# Patient Record
Sex: MALE | Race: WHITE | NOT HISPANIC OR LATINO | Employment: FULL TIME | URBAN - METROPOLITAN AREA
[De-identification: names, ages, dates, MRNs, and addresses within clinical notes are randomized per-mention and may not be internally consistent; named-entity substitution may affect disease eponyms.]

---

## 2013-06-09 LAB — EXTERNAL HIV SCREEN: NORMAL

## 2017-04-18 ENCOUNTER — GENERIC CONVERSION - ENCOUNTER (OUTPATIENT)
Dept: OTHER | Facility: OTHER | Age: 42
End: 2017-04-18

## 2017-06-08 ENCOUNTER — TRANSCRIBE ORDERS (OUTPATIENT)
Dept: LAB | Facility: MEDICAL CENTER | Age: 42
End: 2017-06-08

## 2017-06-08 ENCOUNTER — APPOINTMENT (OUTPATIENT)
Dept: LAB | Facility: MEDICAL CENTER | Age: 42
End: 2017-06-08
Payer: COMMERCIAL

## 2017-06-08 DIAGNOSIS — K59.00 UNSPECIFIED CONSTIPATION: ICD-10-CM

## 2017-06-08 DIAGNOSIS — K21.9 GASTROESOPHAGEAL REFLUX DISEASE, ESOPHAGITIS PRESENCE NOT SPECIFIED: ICD-10-CM

## 2017-06-08 DIAGNOSIS — K21.9 GASTROESOPHAGEAL REFLUX DISEASE, ESOPHAGITIS PRESENCE NOT SPECIFIED: Primary | ICD-10-CM

## 2017-06-08 LAB — TSH SERPL DL<=0.05 MIU/L-ACNC: 1.2 UIU/ML (ref 0.36–3.74)

## 2017-06-08 PROCEDURE — 84443 ASSAY THYROID STIM HORMONE: CPT

## 2017-06-08 PROCEDURE — 36415 COLL VENOUS BLD VENIPUNCTURE: CPT

## 2017-06-19 ENCOUNTER — GENERIC CONVERSION - ENCOUNTER (OUTPATIENT)
Dept: OTHER | Facility: OTHER | Age: 42
End: 2017-06-19

## 2018-04-21 ENCOUNTER — OFFICE VISIT (OUTPATIENT)
Dept: URGENT CARE | Facility: CLINIC | Age: 43
End: 2018-04-21
Payer: COMMERCIAL

## 2018-04-21 VITALS
DIASTOLIC BLOOD PRESSURE: 82 MMHG | RESPIRATION RATE: 20 BRPM | BODY MASS INDEX: 40.04 KG/M2 | WEIGHT: 286 LBS | SYSTOLIC BLOOD PRESSURE: 132 MMHG | HEIGHT: 71 IN | HEART RATE: 72 BPM | TEMPERATURE: 98.8 F | OXYGEN SATURATION: 97 %

## 2018-04-21 DIAGNOSIS — M10.9 ACUTE GOUT INVOLVING TOE OF RIGHT FOOT, UNSPECIFIED CAUSE: Primary | ICD-10-CM

## 2018-04-21 DIAGNOSIS — K21.9 GERD WITHOUT ESOPHAGITIS: ICD-10-CM

## 2018-04-21 PROCEDURE — 99214 OFFICE O/P EST MOD 30 MIN: CPT | Performed by: PHYSICIAN ASSISTANT

## 2018-04-21 RX ORDER — OMEPRAZOLE 20 MG/1
1 CAPSULE, DELAYED RELEASE ORAL DAILY
COMMUNITY
Start: 2012-08-13 | End: 2018-06-11 | Stop reason: SDUPTHER

## 2018-04-21 RX ORDER — INDOMETHACIN 50 MG/1
50 CAPSULE ORAL 2 TIMES DAILY WITH MEALS
Qty: 30 CAPSULE | Refills: 0 | Status: SHIPPED | OUTPATIENT
Start: 2018-04-21 | End: 2018-04-21 | Stop reason: SDUPTHER

## 2018-04-21 RX ORDER — INDOMETHACIN 50 MG/1
50 CAPSULE ORAL 2 TIMES DAILY WITH MEALS
Qty: 30 CAPSULE | Refills: 0 | Status: SHIPPED | OUTPATIENT
Start: 2018-04-21 | End: 2018-04-27

## 2018-04-21 NOTE — PATIENT INSTRUCTIONS
Low purine diet  Take Indocin with food, no alcohol 3 times daily as needed  Continue to take omeprazole 20 milligrams daily  Of eyes that the Indocin could aggravate reflux  Discontinue the medication if noticing recurrence of reflux symptoms  Follow-up with primary care provider if there is no improvement over the next 2-3 days  If occurrences or frequent can discuss prophylactic treatment with primary care provider

## 2018-04-21 NOTE — PROGRESS NOTES
[address] [facility]        NAME: Rliey Smith is a 37 y o  male  : 1975    MRN: [id number]  DATE: 2018  TIME: 1:24 PM    Assessment and Plan   Acute gout involving toe of right foot, unspecified cause [M10 9]  1  Acute gout involving toe of right foot, unspecified cause  indomethacin (INDOCIN) 50 mg capsule    DISCONTINUED: indomethacin (INDOCIN) 50 mg capsule         Patient Instructions     Patient Instructions   Low purine diet  Take Indocin with food, no alcohol 3 times daily as needed  Continue to take omeprazole 20 milligrams daily  Of eyes that the Indocin could aggravate reflux  Discontinue the medication if noticing recurrence of reflux symptoms  Follow-up with primary care provider if there is no improvement over the next 2-3 days  If occurrences or frequent can discuss prophylactic treatment with primary care provider  M*AddThis software was used to dictate this note  It may contain errors with dictating incorrect words/spelling  Please contact provider directly for any questions  Follow up with PCP in 3-5 days  Proceed to  ER if symptoms worsen  Chief Complaint     Chief Complaint   Patient presents with    Toe Pain     R great toe redness and swelling noted Navarro Salinas LPN         History of Present Illness       Patient presents today with right great toe pain over the past several days  He has had increasing symptoms over the past 24 hours  Over the past week he states he has been eating she could, drinking beer  Last evening he had a turkey sandwich  He states he had another episode several months ago  He never saw his family provider to have any blood work done to check for gout  Several months ago when it occurred he states he took ibuprofen and symptoms resolved over several days  He denies any trauma  He notices pain even with light touch    He does have a history of reflux and he states that his symptoms are well controlled on omeprazole daily         Review of Systems   Review of Systems   Constitutional: Negative for chills and fever  Gastrointestinal: Negative for abdominal pain, nausea and vomiting  Musculoskeletal:        As stated in HPI         Current Medications       Current Outpatient Prescriptions:     omeprazole (PriLOSEC) 20 mg delayed release capsule, Take 1 capsule by mouth daily, Disp: , Rfl:     indomethacin (INDOCIN) 50 mg capsule, Take 1 capsule (50 mg total) by mouth 2 (two) times a day with meals, Disp: 30 capsule, Rfl: 0    Current Allergies     Allergies as of 04/21/2018 - Reviewed 04/21/2018   Allergen Reaction Noted    Azithromycin  06/15/2012    Penicillins  11/13/2013            The following portions of the patient's history were reviewed and updated as appropriate: allergies, current medications, past family history, past medical history, past social history, past surgical history and problem list      No past medical history on file  No past surgical history on file  No family history on file  Medications have been verified  Objective   /82 (BP Location: Right arm, Patient Position: Sitting, Cuff Size: Large)   Pulse 72   Temp 98 8 °F (37 1 °C) (Tympanic)   Resp 20   Ht 5' 11" (1 803 m)   Wt 130 kg (286 lb)   SpO2 97%   BMI 39 89 kg/m²        Physical Exam     Physical Exam   Constitutional: He appears well-developed and well-nourished  HENT:   Head: Normocephalic and atraumatic  Cardiovascular: Normal rate, regular rhythm and normal heart sounds  No murmur heard  Pulmonary/Chest: Effort normal and breath sounds normal  No respiratory distress  He has no wheezes  He has no rales  Abdominal: Soft  Bowel sounds are normal    Musculoskeletal:   Right great toe: There is some swelling over the MTP joint and great toe  Tenderness even with light touch  No erythema

## 2018-04-27 ENCOUNTER — OFFICE VISIT (OUTPATIENT)
Dept: INTERNAL MEDICINE CLINIC | Facility: CLINIC | Age: 43
End: 2018-04-27
Payer: COMMERCIAL

## 2018-04-27 VITALS
WEIGHT: 290 LBS | HEIGHT: 71 IN | OXYGEN SATURATION: 97 % | HEART RATE: 64 BPM | RESPIRATION RATE: 16 BRPM | SYSTOLIC BLOOD PRESSURE: 140 MMHG | TEMPERATURE: 98.1 F | DIASTOLIC BLOOD PRESSURE: 88 MMHG | BODY MASS INDEX: 40.6 KG/M2

## 2018-04-27 DIAGNOSIS — K21.9 GERD WITHOUT ESOPHAGITIS: ICD-10-CM

## 2018-04-27 DIAGNOSIS — M10.9 ACUTE GOUT INVOLVING TOE OF RIGHT FOOT, UNSPECIFIED CAUSE: Primary | ICD-10-CM

## 2018-04-27 DIAGNOSIS — E78.49 OTHER HYPERLIPIDEMIA: ICD-10-CM

## 2018-04-27 PROCEDURE — 3008F BODY MASS INDEX DOCD: CPT | Performed by: NURSE PRACTITIONER

## 2018-04-27 PROCEDURE — 99214 OFFICE O/P EST MOD 30 MIN: CPT | Performed by: NURSE PRACTITIONER

## 2018-04-27 NOTE — PROGRESS NOTES
Assessment/Plan: Patient was instructed to continue Indocin at this time  He still has several pills left  He feels it is getting better but would like a uric acid level done  Will order fasting labs to have done and last LDL was up at 126 in 2016  His BP today was slightly up at 140/86  He does check this at home and was advised if any continued high readings to call the office  No problem-specific Assessment & Plan notes found for this encounter  Problem List Items Addressed This Visit     GERD without esophagitis    Relevant Orders    Comprehensive metabolic panel    CBC and differential    TSH, 3rd generation with T4 reflex    Lipid panel    Acute gout involving toe of right foot - Primary    Relevant Orders    Uric acid    Comprehensive metabolic panel    CBC and differential    TSH, 3rd generation with T4 reflex    Lipid panel    Other hyperlipidemia    Relevant Orders    Comprehensive metabolic panel    CBC and differential    TSH, 3rd generation with T4 reflex    Lipid panel            Subjective:      Patient ID: Isaac Fischer is a 37 y o  male  [place] is here today for an UC follow up visit  He was in the UC on 4/21 for complaints right great toe pain over the past several days  He was started on Indocin 50 mg 3 times daily as needed  He is still taking the Indocin and states his great toe is feeling much better he still does have some pain but nothing like it was  She states there is still some redness but this usually does help his symptoms  He also would like blood work done due to not having it done in quite some time  He states he does not eat a very good diet and is trying to watch it more closely  He denies any chest pain, SOB, or palpitations  He denies any other complaints  The following portions of the patient's history were reviewed and updated as appropriate:   He  has no past medical history on file    He   Patient Active Problem List    Diagnosis Date Noted    Other hyperlipidemia 04/27/2018    Acute gout involving toe of right foot 04/21/2018    Peripheral edema 06/08/2016    Diverticulitis of colon 11/13/2013    Asthma 06/15/2012    GERD without esophagitis 06/15/2012    Obesity 06/15/2012    Obstructive sleep apnea 06/15/2012     He  has no past surgical history on file  His family history includes Cancer in his family; Diabetes in his mother; Heart disease in his paternal grandfather; Stroke in his father and maternal grandmother  He  reports that he has never smoked  He has never used smokeless tobacco  He reports that he drinks alcohol  He reports that he does not use drugs  Current Outpatient Prescriptions   Medication Sig Dispense Refill    omeprazole (PriLOSEC) 20 mg delayed release capsule Take 1 capsule by mouth daily       No current facility-administered medications for this visit  Current Outpatient Prescriptions on File Prior to Visit   Medication Sig    omeprazole (PriLOSEC) 20 mg delayed release capsule Take 1 capsule by mouth daily    [DISCONTINUED] indomethacin (INDOCIN) 50 mg capsule Take 1 capsule (50 mg total) by mouth 2 (two) times a day with meals     No current facility-administered medications on file prior to visit  He is allergic to azithromycin and penicillins       Review of Systems   Constitutional: Negative  HENT: Negative  Eyes: Negative  Respiratory: Negative  Cardiovascular: Negative  Gastrointestinal: Negative  Endocrine: Negative  Genitourinary: Negative  Musculoskeletal: Negative  Right great toe pain   Skin: Negative  Allergic/Immunologic: Negative  Neurological: Negative  Hematological: Negative  Psychiatric/Behavioral: Negative            Objective:      /88 (BP Location: Right arm, Patient Position: Sitting, Cuff Size: Large)   Pulse 64   Temp 98 1 °F (36 7 °C) (Oral)   Resp 16   Ht 5' 11" (1 803 m)   Wt 132 kg (290 lb)   SpO2 97%   BMI 40 45 kg/m²          Physical Exam   Constitutional: He is oriented to person, place, and time  He appears well-developed and well-nourished  HENT:   Head: Normocephalic and atraumatic  Right Ear: External ear normal    Left Ear: External ear normal    Nose: Nose normal    Mouth/Throat: Oropharynx is clear and moist    Eyes: Conjunctivae and EOM are normal  Pupils are equal, round, and reactive to light  Neck: Normal range of motion  Neck supple  Cardiovascular: Normal rate, regular rhythm, normal heart sounds and intact distal pulses  Pulmonary/Chest: Effort normal and breath sounds normal    Abdominal: Soft  Bowel sounds are normal    Musculoskeletal: Normal range of motion  Neurological: He is alert and oriented to person, place, and time  He has normal reflexes  Skin: Skin is warm and dry  Slight swelling noted to right great toe no tenderness noted   Psychiatric: He has a normal mood and affect  His behavior is normal  Judgment and thought content normal    Vitals reviewed

## 2018-05-04 ENCOUNTER — LAB (OUTPATIENT)
Dept: LAB | Facility: MEDICAL CENTER | Age: 43
End: 2018-05-04
Payer: COMMERCIAL

## 2018-05-04 ENCOUNTER — TRANSCRIBE ORDERS (OUTPATIENT)
Dept: RADIOLOGY | Facility: MEDICAL CENTER | Age: 43
End: 2018-05-04

## 2018-05-04 DIAGNOSIS — M10.9 ACUTE GOUT INVOLVING TOE OF RIGHT FOOT, UNSPECIFIED CAUSE: ICD-10-CM

## 2018-05-04 DIAGNOSIS — K21.9 GERD WITHOUT ESOPHAGITIS: ICD-10-CM

## 2018-05-04 DIAGNOSIS — E78.49 OTHER HYPERLIPIDEMIA: ICD-10-CM

## 2018-05-04 LAB
ALBUMIN SERPL BCP-MCNC: 3.8 G/DL (ref 3.5–5)
ALP SERPL-CCNC: 60 U/L (ref 46–116)
ALT SERPL W P-5'-P-CCNC: 31 U/L (ref 12–78)
ANION GAP SERPL CALCULATED.3IONS-SCNC: 7 MMOL/L (ref 4–13)
AST SERPL W P-5'-P-CCNC: 19 U/L (ref 5–45)
BASOPHILS # BLD AUTO: 0.03 THOUSANDS/ΜL (ref 0–0.1)
BASOPHILS NFR BLD AUTO: 0 % (ref 0–1)
BILIRUB SERPL-MCNC: 0.3 MG/DL (ref 0.2–1)
BUN SERPL-MCNC: 25 MG/DL (ref 5–25)
CALCIUM SERPL-MCNC: 9.1 MG/DL (ref 8.3–10.1)
CHLORIDE SERPL-SCNC: 108 MMOL/L (ref 100–108)
CHOLEST SERPL-MCNC: 164 MG/DL (ref 50–200)
CO2 SERPL-SCNC: 26 MMOL/L (ref 21–32)
CREAT SERPL-MCNC: 0.83 MG/DL (ref 0.6–1.3)
EOSINOPHIL # BLD AUTO: 0.35 THOUSAND/ΜL (ref 0–0.61)
EOSINOPHIL NFR BLD AUTO: 4 % (ref 0–6)
ERYTHROCYTE [DISTWIDTH] IN BLOOD BY AUTOMATED COUNT: 13.6 % (ref 11.6–15.1)
GFR SERPL CREATININE-BSD FRML MDRD: 108 ML/MIN/1.73SQ M
GLUCOSE P FAST SERPL-MCNC: 91 MG/DL (ref 65–99)
HCT VFR BLD AUTO: 44 % (ref 36.5–49.3)
HDLC SERPL-MCNC: 31 MG/DL (ref 40–60)
HGB BLD-MCNC: 14.9 G/DL (ref 12–17)
LDLC SERPL CALC-MCNC: 92 MG/DL (ref 0–100)
LYMPHOCYTES # BLD AUTO: 2.9 THOUSANDS/ΜL (ref 0.6–4.47)
LYMPHOCYTES NFR BLD AUTO: 36 % (ref 14–44)
MCH RBC QN AUTO: 28.2 PG (ref 26.8–34.3)
MCHC RBC AUTO-ENTMCNC: 33.9 G/DL (ref 31.4–37.4)
MCV RBC AUTO: 83 FL (ref 82–98)
MONOCYTES # BLD AUTO: 0.8 THOUSAND/ΜL (ref 0.17–1.22)
MONOCYTES NFR BLD AUTO: 10 % (ref 4–12)
NEUTROPHILS # BLD AUTO: 3.88 THOUSANDS/ΜL (ref 1.85–7.62)
NEUTS SEG NFR BLD AUTO: 50 % (ref 43–75)
NONHDLC SERPL-MCNC: 133 MG/DL
NRBC BLD AUTO-RTO: 0 /100 WBCS
PLATELET # BLD AUTO: 213 THOUSANDS/UL (ref 149–390)
PMV BLD AUTO: 11.1 FL (ref 8.9–12.7)
POTASSIUM SERPL-SCNC: 4.1 MMOL/L (ref 3.5–5.3)
PROT SERPL-MCNC: 7.4 G/DL (ref 6.4–8.2)
RBC # BLD AUTO: 5.28 MILLION/UL (ref 3.88–5.62)
SODIUM SERPL-SCNC: 141 MMOL/L (ref 136–145)
TRIGL SERPL-MCNC: 203 MG/DL
TSH SERPL DL<=0.05 MIU/L-ACNC: 1.46 UIU/ML (ref 0.36–3.74)
URATE SERPL-MCNC: 7 MG/DL (ref 4.2–8)
WBC # BLD AUTO: 7.96 THOUSAND/UL (ref 4.31–10.16)

## 2018-05-04 PROCEDURE — 80053 COMPREHEN METABOLIC PANEL: CPT

## 2018-05-04 PROCEDURE — 84550 ASSAY OF BLOOD/URIC ACID: CPT

## 2018-05-04 PROCEDURE — 84443 ASSAY THYROID STIM HORMONE: CPT

## 2018-05-04 PROCEDURE — 36415 COLL VENOUS BLD VENIPUNCTURE: CPT

## 2018-05-04 PROCEDURE — 80061 LIPID PANEL: CPT

## 2018-05-04 PROCEDURE — 85025 COMPLETE CBC W/AUTO DIFF WBC: CPT

## 2018-05-07 NOTE — PROGRESS NOTES
Can you let Darshan Kaleigh know his blood work did come back showing his Uric acid is normal at 7 and his triglycerides are up at 203 we like this less than 150  He can add fiber in his diet to help bring this down

## 2018-05-11 DIAGNOSIS — M10.00 IDIOPATHIC GOUT, UNSPECIFIED CHRONICITY, UNSPECIFIED SITE: Primary | ICD-10-CM

## 2018-05-11 RX ORDER — COLCHICINE 0.6 MG/1
TABLET ORAL
Qty: 3 TABLET | Refills: 0 | Status: SHIPPED | OUTPATIENT
Start: 2018-05-11 | End: 2018-06-11 | Stop reason: ALTCHOICE

## 2018-05-11 NOTE — TELEPHONE ENCOUNTER
Received a call from Osvaldo Rios stating that his toe is swollen and in pain again  After taking the indomethacin he was feeling better for about 1 week, but since 5/10/18, it has gotten worse  Notified Susan Gay since she's not in the office  And Per Susan Gay see if Osvaldo Blanca would like colchicine 0 6mg sent to the pharmacy  SItab now, 1tab in 2 hours, and can repeat 3rd dose in 2 hrs  Pt is to stop if pain subsides or having diarrhea  Osvaldo Rios is to stay away from beer and seafood  [facility] and reviewed all information including to stop if pain subsides or having diarrhea, as well as to sty away from beer and seafood  He wanted to try rx and I called it into [facility]

## 2018-06-11 ENCOUNTER — OFFICE VISIT (OUTPATIENT)
Dept: INTERNAL MEDICINE CLINIC | Facility: CLINIC | Age: 43
End: 2018-06-11
Payer: COMMERCIAL

## 2018-06-11 VITALS
TEMPERATURE: 98.5 F | SYSTOLIC BLOOD PRESSURE: 122 MMHG | HEART RATE: 66 BPM | DIASTOLIC BLOOD PRESSURE: 70 MMHG | WEIGHT: 285.25 LBS | BODY MASS INDEX: 39.94 KG/M2 | OXYGEN SATURATION: 96 % | HEIGHT: 71 IN

## 2018-06-11 DIAGNOSIS — M10.071 ACUTE IDIOPATHIC GOUT INVOLVING TOE OF RIGHT FOOT: Primary | ICD-10-CM

## 2018-06-11 DIAGNOSIS — G47.33 OBSTRUCTIVE SLEEP APNEA: ICD-10-CM

## 2018-06-11 DIAGNOSIS — K21.9 GASTROESOPHAGEAL REFLUX DISEASE WITHOUT ESOPHAGITIS: ICD-10-CM

## 2018-06-11 PROCEDURE — 99215 OFFICE O/P EST HI 40 MIN: CPT | Performed by: INTERNAL MEDICINE

## 2018-06-11 RX ORDER — OMEPRAZOLE 20 MG/1
20 CAPSULE, DELAYED RELEASE ORAL DAILY
Qty: 30 CAPSULE | Refills: 5 | Status: SHIPPED | OUTPATIENT
Start: 2018-06-11 | End: 2019-08-17 | Stop reason: SDUPTHER

## 2018-06-11 RX ORDER — TERBINAFINE HYDROCHLORIDE 250 MG/1
TABLET ORAL
COMMUNITY
Start: 2018-05-11 | End: 2021-08-05 | Stop reason: ALTCHOICE

## 2018-06-11 NOTE — PROGRESS NOTES
Assessment/Plan:         Diagnoses and all orders for this visit:    Acute idiopathic gout involving toe of right foot  Recent flare ?etiology with normal uric acid level  Increase water intake  Recent labs reviewed     Gastroesophageal reflux disease without esophagitis  -     omeprazole (PriLOSEC) 20 mg delayed release capsule; Take 1 capsule (20 mg total) by mouth daily for 30 days  Diet is challenged by his hectic schedule  Increase fiber, limit iced tea, increae water intake,increase lean protein    Obstructive sleep apnea  Continue cpap ?update study soon  He will call his supplier and check status as it seems like he would be due for new machine at this point but aware may need repeat sleep study since greater than 5 years    Other orders  -     terbinafine (LamISIL) 250 mg tablet;     Rto prn       Patient ID: Paula Low is a 37 y o  male  HPI   Pt new patient to establish  Recent gout flare - no prior history  Sxs resolved now  Lot of stress recently  No chest pain or sob  Feels otherwise has been ok  Recent labs were ok        Review of Systems   Constitutional: Negative  HENT: Negative  Eyes: Negative  Respiratory: Negative  Cardiovascular: Negative  Gastrointestinal: Negative  Endocrine: Negative  Genitourinary: Negative  Musculoskeletal: Positive for arthralgias and joint swelling  Skin: Negative  Allergic/Immunologic: Negative  Neurological: Negative  Hematological: Negative  Psychiatric/Behavioral: Negative  Allergies   Allergen Reactions    Azithromycin     Penicillins      Social History     Social History    Marital status: /Civil Union     Spouse name: N/A    Number of children: N/A    Years of education: N/A     Occupational History    Not on file       Social History Main Topics    Smoking status: Never Smoker    Smokeless tobacco: Never Used      Comment: (unknown if ever smoked-as per [employer])    Alcohol use Yes Comment: socially    Drug use: No    Sexual activity: Not on file     Other Topics Concern    Not on file     Social History Narrative    No narrative on file     Past Medical History:   Diagnosis Date    Diverticulitis of colon     GERD (gastroesophageal reflux disease)      History reviewed  No pertinent surgical history  /70   Pulse 66   Temp 98 5 °F (36 9 °C) (Tympanic)   Ht 5' 11" (1 803 m)   Wt 129 kg (285 lb 4 oz)   SpO2 96%   BMI 39 78 kg/m²      Physical Exam   Constitutional: He is oriented to person, place, and time  He appears well-developed and well-nourished  No distress  HENT:   Head: Normocephalic and atraumatic  Right Ear: External ear normal    Left Ear: External ear normal    Nose: Nose normal    Mouth/Throat: Oropharynx is clear and moist  No oropharyngeal exudate  Eyes: Conjunctivae and EOM are normal  Pupils are equal, round, and reactive to light  No scleral icterus  Neck: Normal range of motion  Neck supple  No JVD present  Cardiovascular: Normal rate, regular rhythm, normal heart sounds and intact distal pulses  Pulmonary/Chest: Effort normal and breath sounds normal    Abdominal: Soft  Bowel sounds are normal    Musculoskeletal: Normal range of motion  He exhibits deformity  He exhibits no edema or tenderness  Lymphadenopathy:     He has no cervical adenopathy  Neurological: He is alert and oriented to person, place, and time  He has normal reflexes  He displays normal reflexes  No cranial nerve deficit  He exhibits abnormal muscle tone  Coordination normal    Skin: Skin is warm and dry  He is not diaphoretic  Psychiatric: He has a normal mood and affect  His behavior is normal  Judgment and thought content normal    Nursing note and vitals reviewed

## 2018-06-11 NOTE — PATIENT INSTRUCTIONS
Gout   AMBULATORY CARE:   Gout  is a form of arthritis that causes severe joint pain and stiffness  Acute gout pain starts suddenly, gets worse quickly, and stops on its own  Acute gout can become chronic and cause permanent damage to your joints  Seek care immediately if:   · You have severe pain in one or more of your joints that you cannot tolerate  · You have a fever or redness that spreads beyond the joint area  Contact your healthcare provider if:   · You have new symptoms, such as a rash, after you start gout treatment  · Your joint pain and swelling do not go away, even after treatment  · You are not urinating as much or as often as you usually do  · You have trouble taking your gout medicines  · You have questions or concerns about your condition or care  Stages of gout:   · Hyperuricemia  starts with high levels of uric acid  Hyperuricemia is not gout, but it increases your risk for gout  You may have no symptoms at this stage, and it usually does not need treatment  · Acute gouty arthritis  starts with a sudden attack of pain and swelling, usually in 1 joint  The attack may last from a few days to 2 weeks  · Intercritical gout  is the time between attacks  You may go months or years without another attack  You will not have joint pain or stiffness, but this does not mean your gout is cured  You will still need treatment to prevent chronic gout  · Chronic tophaceous gout  develops if gout is not treated  Large amounts of uric acid crystals, called tophi, collect around your joints  The crystals can destroy or deform the joints  Gout attacks occur more often, and last hours to weeks  More than 1 joint may be painful and swollen  At this stage, gout symptoms do not go away on their own  Treatment  can make your symptoms stop sooner, prevent attacks, and decrease your risk of joint damage   You may need any of the following:  · Medicines:      ¨ NSAIDs , such as ibuprofen, help decrease swelling, pain, and fever  This medicine is available with or without a doctor's order  NSAIDs can cause stomach bleeding or kidney problems in certain people  If you take blood thinner medicine, always ask your healthcare provider if NSAIDs are safe for you  Always read the medicine label and follow directions  ¨ Gout medicine  decreases joint pain and swelling  It may also be given to prevent new gout attacks  ¨ Steroids  reduce inflammation and can help your joint stiffness and pain during gout attacks  ¨ Uric acid medicine  may be given to reduce the amount of uric acid your body makes  Some medicines may help you pass more uric acid when you urinate  ¨ Take your medicine as directed  Contact your healthcare provider if you think your medicine is not helping or if you have side effects  Tell him or her if you are allergic to any medicine  Keep a list of the medicines, vitamins, and herbs you take  Include the amounts, and when and why you take them  Bring the list or the pill bottles to follow-up visits  Carry your medicine list with you in case of an emergency  · Surgery  called a bone graft may be needed for tophi that are painful or infected  Bone in the joint may be replaced with bone taken from another place in your body  Ask your healthcare provider for more information about bone graft surgery  Manage gout:   · Rest your painful joint so it can heal   Your healthcare provider may recommend crutches or a walker if the affected joint is in a leg  · Apply ice to your joint  Ice decreases pain and swelling  Use an ice pack, or put crushed ice in a plastic bag  Cover the ice pack or bag with a towel before you apply it to your painful joint  Apply ice for 15 to 20 minutes every hour, or as directed  · Elevate your joint  Elevation helps reduce swelling and pain  Raise your joint above the level of your heart as often as you can   Prop your painful joint on pillows to keep it above your heart comfortably  · Go to physical therapy if directed  A physical therapist can teach you exercises to improve flexibility and range of motion  Prevent gout attacks:   · Do not eat high-purine foods  These foods include meats, seafood, asparagus, spinach, cauliflower, and some types of beans  Healthcare providers may tell you to eat more low-fat milk products, such as yogurt  Milk products may decrease your risk for gout attacks  Vitamin C and coffee may also help  Your healthcare provider or dietitian can help you create a meal plan  · Drink more liquids as directed  Liquids such as water help remove uric acid from your body  Ask how much liquid to drink each day and which liquids are best for you  · Manage your weight  Weight loss may decrease the amount of uric acid in your body  Exercise can help you lose weight  Talk to your healthcare provider about the best exercises for you  · Control your blood sugar level if you have diabetes  Keep your blood sugar level in a normal range  This can help prevent gout attacks  · Limit or do not drink alcohol as directed  Alcohol can trigger a gout attack  Alcohol also increases your risk for dehydration  Ask your healthcare provider if alcohol is safe for you  Follow up with your healthcare provider as directed:  Write down your questions so you remember to ask them during your visits  © 2017 [address] Information is for End User's use only and may not be sold, redistributed or otherwise used for commercial purposes  All illustrations and images included in CareNotes® are the copyrighted property of [employer]  or [employer]  The above information is an  only  It is not intended as medical advice for individual conditions or treatments  Talk to your doctor, nurse or pharmacist before following any medical regimen to see if it is safe and effective for you

## 2018-06-25 ENCOUNTER — LAB (OUTPATIENT)
Dept: LAB | Facility: MEDICAL CENTER | Age: 43
End: 2018-06-25
Payer: COMMERCIAL

## 2018-06-25 ENCOUNTER — APPOINTMENT (OUTPATIENT)
Dept: RADIOLOGY | Facility: MEDICAL CENTER | Age: 43
End: 2018-06-25
Payer: COMMERCIAL

## 2018-06-25 ENCOUNTER — TRANSCRIBE ORDERS (OUTPATIENT)
Dept: RADIOLOGY | Facility: MEDICAL CENTER | Age: 43
End: 2018-06-25

## 2018-06-25 DIAGNOSIS — M10.071 ACUTE IDIOPATHIC GOUT INVOLVING TOE OF RIGHT FOOT: ICD-10-CM

## 2018-06-25 LAB
CRP SERPL QL: 3.9 MG/L
ERYTHROCYTE [SEDIMENTATION RATE] IN BLOOD: 8 MM/HOUR (ref 0–10)

## 2018-06-25 PROCEDURE — 86140 C-REACTIVE PROTEIN: CPT

## 2018-06-25 PROCEDURE — 36415 COLL VENOUS BLD VENIPUNCTURE: CPT

## 2018-06-25 PROCEDURE — 85652 RBC SED RATE AUTOMATED: CPT

## 2018-06-25 PROCEDURE — 73630 X-RAY EXAM OF FOOT: CPT

## 2018-07-24 ENCOUNTER — TELEPHONE (OUTPATIENT)
Dept: INTERNAL MEDICINE CLINIC | Facility: CLINIC | Age: 43
End: 2018-07-24

## 2018-07-24 DIAGNOSIS — M10.9 GOUT INVOLVING TOE OF RIGHT FOOT, UNSPECIFIED CAUSE, UNSPECIFIED CHRONICITY: Primary | ICD-10-CM

## 2018-07-24 RX ORDER — INDOMETHACIN 50 MG/1
50 CAPSULE ORAL 2 TIMES DAILY WITH MEALS
Qty: 10 CAPSULE | Refills: 0 | Status: SHIPPED | OUTPATIENT
Start: 2018-07-24 | End: 2019-09-04

## 2018-07-24 NOTE — TELEPHONE ENCOUNTER
Patient called with c/o R toe gout pain    States he has tried colchicine in the past   Asking for medication    All-pcn, zpack  [facility]

## 2018-07-26 ENCOUNTER — APPOINTMENT (OUTPATIENT)
Dept: LAB | Facility: MEDICAL CENTER | Age: 43
End: 2018-07-26
Payer: COMMERCIAL

## 2018-07-26 ENCOUNTER — TRANSCRIBE ORDERS (OUTPATIENT)
Dept: LAB | Facility: MEDICAL CENTER | Age: 43
End: 2018-07-26

## 2018-07-26 DIAGNOSIS — J91.8 PLEURAL EFFUSION ASSOCIATED WITH HEPATIC DISORDER: ICD-10-CM

## 2018-07-26 DIAGNOSIS — J91.8 PLEURAL EFFUSION ASSOCIATED WITH HEPATIC DISORDER: Primary | ICD-10-CM

## 2018-07-26 DIAGNOSIS — K76.9 PLEURAL EFFUSION ASSOCIATED WITH HEPATIC DISORDER: ICD-10-CM

## 2018-07-26 DIAGNOSIS — K76.9 PLEURAL EFFUSION ASSOCIATED WITH HEPATIC DISORDER: Primary | ICD-10-CM

## 2018-07-26 LAB
ALBUMIN SERPL BCP-MCNC: 3.7 G/DL (ref 3.5–5)
ALP SERPL-CCNC: 57 U/L (ref 46–116)
ALT SERPL W P-5'-P-CCNC: 28 U/L (ref 12–78)
AST SERPL W P-5'-P-CCNC: 17 U/L (ref 5–45)
BASOPHILS # BLD AUTO: 0.03 THOUSANDS/ΜL (ref 0–0.1)
BASOPHILS NFR BLD AUTO: 0 % (ref 0–1)
BILIRUB DIRECT SERPL-MCNC: 0.07 MG/DL (ref 0–0.2)
BILIRUB SERPL-MCNC: 0.39 MG/DL (ref 0.2–1)
EOSINOPHIL # BLD AUTO: 0.13 THOUSAND/ΜL (ref 0–0.61)
EOSINOPHIL NFR BLD AUTO: 2 % (ref 0–6)
ERYTHROCYTE [DISTWIDTH] IN BLOOD BY AUTOMATED COUNT: 13.5 % (ref 11.6–15.1)
HCT VFR BLD AUTO: 44.8 % (ref 36.5–49.3)
HGB BLD-MCNC: 14.3 G/DL (ref 12–17)
IMM GRANULOCYTES # BLD AUTO: 0.02 THOUSAND/UL (ref 0–0.2)
IMM GRANULOCYTES NFR BLD AUTO: 0 % (ref 0–2)
LYMPHOCYTES # BLD AUTO: 2.34 THOUSANDS/ΜL (ref 0.6–4.47)
LYMPHOCYTES NFR BLD AUTO: 33 % (ref 14–44)
MCH RBC QN AUTO: 27.6 PG (ref 26.8–34.3)
MCHC RBC AUTO-ENTMCNC: 31.9 G/DL (ref 31.4–37.4)
MCV RBC AUTO: 87 FL (ref 82–98)
MONOCYTES # BLD AUTO: 0.74 THOUSAND/ΜL (ref 0.17–1.22)
MONOCYTES NFR BLD AUTO: 11 % (ref 4–12)
NEUTROPHILS # BLD AUTO: 3.78 THOUSANDS/ΜL (ref 1.85–7.62)
NEUTS SEG NFR BLD AUTO: 54 % (ref 43–75)
NRBC BLD AUTO-RTO: 0 /100 WBCS
PLATELET # BLD AUTO: 207 THOUSANDS/UL (ref 149–390)
PMV BLD AUTO: 11.4 FL (ref 8.9–12.7)
PROT SERPL-MCNC: 7.6 G/DL (ref 6.4–8.2)
RBC # BLD AUTO: 5.18 MILLION/UL (ref 3.88–5.62)
WBC # BLD AUTO: 7.04 THOUSAND/UL (ref 4.31–10.16)

## 2018-07-26 PROCEDURE — 80076 HEPATIC FUNCTION PANEL: CPT

## 2018-07-26 PROCEDURE — 36415 COLL VENOUS BLD VENIPUNCTURE: CPT

## 2018-07-26 PROCEDURE — 85025 COMPLETE CBC W/AUTO DIFF WBC: CPT

## 2018-11-30 ENCOUNTER — OFFICE VISIT (OUTPATIENT)
Dept: URGENT CARE | Facility: CLINIC | Age: 43
End: 2018-11-30
Payer: COMMERCIAL

## 2018-11-30 VITALS
WEIGHT: 278 LBS | TEMPERATURE: 100.6 F | DIASTOLIC BLOOD PRESSURE: 73 MMHG | HEART RATE: 84 BPM | OXYGEN SATURATION: 96 % | HEIGHT: 71 IN | RESPIRATION RATE: 20 BRPM | SYSTOLIC BLOOD PRESSURE: 133 MMHG | BODY MASS INDEX: 38.92 KG/M2

## 2018-11-30 DIAGNOSIS — K57.92 DIVERTICULITIS: Primary | ICD-10-CM

## 2018-11-30 DIAGNOSIS — R10.32 LLQ PAIN: ICD-10-CM

## 2018-11-30 PROCEDURE — 99213 OFFICE O/P EST LOW 20 MIN: CPT | Performed by: PHYSICIAN ASSISTANT

## 2018-11-30 RX ORDER — CIPROFLOXACIN 500 MG/1
500 TABLET, FILM COATED ORAL EVERY 12 HOURS SCHEDULED
Qty: 14 TABLET | Refills: 0 | Status: SHIPPED | OUTPATIENT
Start: 2018-11-30 | End: 2018-12-07

## 2018-11-30 RX ORDER — METRONIDAZOLE 500 MG/1
500 TABLET ORAL EVERY 8 HOURS SCHEDULED
Qty: 21 TABLET | Refills: 0 | Status: SHIPPED | OUTPATIENT
Start: 2018-11-30 | End: 2018-12-07

## 2018-11-30 NOTE — PROGRESS NOTES
[address] [facility]  ([facility] [phone]  (fax) [phone]        NAME: Bassam Rhodes is a 37 y o  male  : 1975    MRN: [id number]  DATE: 2018  TIME: 11:19 AM    Assessment and Plan   Diverticulitis [K57 92]  1  Diverticulitis  ciprofloxacin (CIPRO) 500 mg tablet    metroNIDAZOLE (FLAGYL) 500 mg tablet   2  LLQ pain         Patient Instructions   I have prescribed an antibiotic for the infection  Please take the antibiotic as prescribed and finish the entire prescription  I recommend that the patient takes an over the counter probiotic to keep good bacteria in the gut and help prevent diarrhea  Ibuprofen and/or tylenol as needed for pain or fever  The antibiotic (Cipro) you have been prescribed can affect the tendons  Patient warned of the risk of tendon rupture due to the antibiotic  Patient is to limit physical activity for 3-4 weeks after finishing the antibiotic  Patient did verbalize understanding  If fever spikes and or pain worsens and or pain does not improve in next 24-48 hours he is to present to ER  Patient did verbalize understanding  Follow up with GI in next 3-5 days  To present to the ER if symptoms worsen  Chief Complaint     Chief Complaint   Patient presents with    Abdominal Pain     Lower abdominal pain x 3 days Hx of diverticulitis  Kyrie Russo LPN         History of Present Illness   Bassam Rhodes presents to the clinic c/o    Abdominal Pain   This is a new problem  The current episode started in the past 7 days  The onset quality is gradual  The problem occurs intermittently  The problem has been gradually improving  The pain is located in the LLQ  The pain is mild  The quality of the pain is aching  The abdominal pain does not radiate  Associated symptoms include constipation (mild) and a fever   Pertinent negatives include no anorexia, arthralgias, belching, diarrhea, dysuria, flatus, frequency, headaches, hematochezia, hematuria, melena, myalgias, nausea or vomiting  The pain is aggravated by certain positions and palpation  Treatments tried: diet adjustments  The treatment provided mild relief  There is no history of abdominal surgery  Review of Systems   Review of Systems   Constitutional: Positive for fever  Negative for activity change, appetite change, chills, diaphoresis and fatigue  HENT: Negative for congestion, ear discharge, ear pain, facial swelling, rhinorrhea, sinus pain, sinus pressure, sneezing and sore throat  Eyes: Negative for photophobia, pain, discharge, redness, itching and visual disturbance  Respiratory: Negative for apnea, cough, chest tightness, shortness of breath and wheezing  Cardiovascular: Negative for chest pain  Gastrointestinal: Positive for abdominal pain and constipation (mild)  Negative for abdominal distention, anal bleeding, anorexia, blood in stool, diarrhea, flatus, hematochezia, melena, nausea and vomiting  Genitourinary: Negative for dysuria, flank pain, frequency, hematuria and urgency  Musculoskeletal: Negative for arthralgias, back pain, gait problem, joint swelling, myalgias, neck pain and neck stiffness  Skin: Negative for color change, rash and wound  Allergic/Immunologic: Negative for immunocompromised state  Neurological: Negative for dizziness, facial asymmetry and headaches  Hematological: Negative for adenopathy  Psychiatric/Behavioral: Negative for confusion and decreased concentration           Current Medications     Long-Term Prescriptions   Medication Sig Dispense Refill    indomethacin (INDOCIN) 50 mg capsule Take 1 capsule (50 mg total) by mouth 2 (two) times a day with meals for 5 days 10 capsule 0    omeprazole (PriLOSEC) 20 mg delayed release capsule Take 1 capsule (20 mg total) by mouth daily for 30 days 30 capsule 5       Current Allergies     Allergies as of 11/30/2018 - Reviewed 11/30/2018   Allergen Reaction Noted    Azithromycin  06/15/2012    Penicillins  11/13/2013            The following portions of the patient's history were reviewed and updated as appropriate: allergies, current medications, past family history, past medical history, past social history, past surgical history and problem list   Past Medical History:   Diagnosis Date    Diverticulitis of colon     GERD (gastroesophageal reflux disease)     Obesity      History reviewed  No pertinent surgical history  Social History     Social History    Marital status: /Civil Union     Spouse name: N/A    Number of children: N/A    Years of education: N/A     Occupational History    Not on file  Social History Main Topics    Smoking status: Never Smoker    Smokeless tobacco: Never Used      Comment: (unknown if ever smoked-as per [employer])    Alcohol use Yes      Comment: socially    Drug use: No    Sexual activity: Not on file     Other Topics Concern    Not on file     Social History Narrative    No narrative on file       Objective   /73 (BP Location: Right arm, Patient Position: Sitting, Cuff Size: Large)   Pulse 84   Temp (!) 100 6 °F (38 1 °C) (Tympanic)   Resp 20   Ht 5' 11" (1 803 m)   Wt 126 kg (278 lb)   SpO2 96%   BMI 38 77 kg/m²      Physical Exam     Physical Exam   Constitutional: He is oriented to person, place, and time  He appears well-developed and well-nourished  No distress  HENT:   Head: Normocephalic and atraumatic  Right Ear: Tympanic membrane and external ear normal    Left Ear: Tympanic membrane and external ear normal    Nose: Nose normal    Mouth/Throat: Oropharynx is clear and moist  No oropharyngeal exudate  Eyes: Pupils are equal, round, and reactive to light  Conjunctivae and EOM are normal  Right eye exhibits no discharge  Left eye exhibits no discharge  No scleral icterus  Neck: Normal range of motion  Neck supple  No JVD present  No tracheal deviation present   No thyromegaly present  Cardiovascular: Normal rate, regular rhythm and normal heart sounds  Exam reveals no gallop and no friction rub  No murmur heard  Pulmonary/Chest: Effort normal and breath sounds normal  No stridor  No respiratory distress  He has no wheezes  He has no rales  He exhibits no tenderness  Abdominal: Soft  Bowel sounds are normal  He exhibits no distension, no ascites and no mass  There is no hepatosplenomegaly  There is tenderness in the left lower quadrant  There is no rigidity, no rebound, no guarding, no CVA tenderness, no tenderness at McBurney's point and negative Petty's sign  Musculoskeletal: Normal range of motion  He exhibits no tenderness or deformity  Lymphadenopathy:     He has no cervical adenopathy  Neurological: He is alert and oriented to person, place, and time  He has normal reflexes  Coordination normal    Skin: Skin is warm and dry  No rash noted  He is not diaphoretic  No erythema  No pallor  Psychiatric: He has a normal mood and affect  His behavior is normal  Judgment and thought content normal    Nursing note and vitals reviewed        Екатерина SpeakerSRINATH

## 2019-06-07 ENCOUNTER — OFFICE VISIT (OUTPATIENT)
Dept: URGENT CARE | Facility: CLINIC | Age: 44
End: 2019-06-07
Payer: COMMERCIAL

## 2019-06-07 VITALS
WEIGHT: 282 LBS | HEART RATE: 72 BPM | OXYGEN SATURATION: 97 % | DIASTOLIC BLOOD PRESSURE: 80 MMHG | TEMPERATURE: 98.6 F | BODY MASS INDEX: 39.48 KG/M2 | SYSTOLIC BLOOD PRESSURE: 136 MMHG | HEIGHT: 71 IN | RESPIRATION RATE: 20 BRPM

## 2019-06-07 DIAGNOSIS — H65.92 MIDDLE EAR EFFUSION, LEFT: Primary | ICD-10-CM

## 2019-06-07 PROCEDURE — 99213 OFFICE O/P EST LOW 20 MIN: CPT | Performed by: PHYSICIAN ASSISTANT

## 2019-06-07 RX ORDER — FLUTICASONE PROPIONATE 50 MCG
1 SPRAY, SUSPENSION (ML) NASAL DAILY
Qty: 16 G | Refills: 0 | Status: SHIPPED | OUTPATIENT
Start: 2019-06-07

## 2019-06-28 ENCOUNTER — OFFICE VISIT (OUTPATIENT)
Dept: OTOLARYNGOLOGY | Facility: CLINIC | Age: 44
End: 2019-06-28
Payer: COMMERCIAL

## 2019-06-28 VITALS
HEIGHT: 71 IN | HEART RATE: 55 BPM | SYSTOLIC BLOOD PRESSURE: 118 MMHG | BODY MASS INDEX: 39.06 KG/M2 | DIASTOLIC BLOOD PRESSURE: 70 MMHG | WEIGHT: 279 LBS

## 2019-06-28 DIAGNOSIS — H90.12 CONDUCTIVE HEARING LOSS OF LEFT EAR, UNSPECIFIED HEARING STATUS ON CONTRALATERAL SIDE: ICD-10-CM

## 2019-06-28 DIAGNOSIS — H65.92 LEFT OTITIS MEDIA WITH EFFUSION: Primary | ICD-10-CM

## 2019-06-28 PROCEDURE — 99243 OFF/OP CNSLTJ NEW/EST LOW 30: CPT | Performed by: OTOLARYNGOLOGY

## 2019-08-15 ENCOUNTER — OFFICE VISIT (OUTPATIENT)
Dept: SLEEP CENTER | Facility: CLINIC | Age: 44
End: 2019-08-15
Payer: COMMERCIAL

## 2019-08-15 ENCOUNTER — OFFICE VISIT (OUTPATIENT)
Dept: UROLOGY | Facility: CLINIC | Age: 44
End: 2019-08-15
Payer: COMMERCIAL

## 2019-08-15 VITALS
OXYGEN SATURATION: 97 % | HEIGHT: 71 IN | DIASTOLIC BLOOD PRESSURE: 78 MMHG | HEART RATE: 84 BPM | WEIGHT: 278 LBS | SYSTOLIC BLOOD PRESSURE: 112 MMHG | BODY MASS INDEX: 38.92 KG/M2

## 2019-08-15 VITALS
SYSTOLIC BLOOD PRESSURE: 126 MMHG | HEIGHT: 71 IN | BODY MASS INDEX: 38.92 KG/M2 | WEIGHT: 278 LBS | HEART RATE: 68 BPM | DIASTOLIC BLOOD PRESSURE: 80 MMHG

## 2019-08-15 DIAGNOSIS — R31.9 HEMATURIA, UNSPECIFIED TYPE: ICD-10-CM

## 2019-08-15 DIAGNOSIS — R40.0 DAYTIME SLEEPINESS: ICD-10-CM

## 2019-08-15 DIAGNOSIS — E66.9 OBESITY (BMI 30-39.9): ICD-10-CM

## 2019-08-15 DIAGNOSIS — R35.0 URINARY FREQUENCY: Primary | ICD-10-CM

## 2019-08-15 DIAGNOSIS — K21.9 GASTROESOPHAGEAL REFLUX DISEASE WITHOUT ESOPHAGITIS: ICD-10-CM

## 2019-08-15 DIAGNOSIS — G47.26 SHIFT WORK SLEEP DISORDER: ICD-10-CM

## 2019-08-15 DIAGNOSIS — J31.0 CHRONIC RHINITIS: ICD-10-CM

## 2019-08-15 DIAGNOSIS — G47.33 OBSTRUCTIVE SLEEP APNEA: Primary | ICD-10-CM

## 2019-08-15 LAB
BACTERIA UR QL AUTO: ABNORMAL /HPF
BILIRUB UR QL STRIP: NEGATIVE
CLARITY UR: CLEAR
COLOR UR: ABNORMAL
GLUCOSE UR STRIP-MCNC: NEGATIVE MG/DL
HGB UR QL STRIP.AUTO: NEGATIVE
HYALINE CASTS #/AREA URNS LPF: ABNORMAL /LPF
KETONES UR STRIP-MCNC: NEGATIVE MG/DL
LEUKOCYTE ESTERASE UR QL STRIP: ABNORMAL
NITRITE UR QL STRIP: NEGATIVE
NON-SQ EPI CELLS URNS QL MICRO: ABNORMAL /HPF
PH UR STRIP.AUTO: 6 [PH]
POST-VOID RESIDUAL VOLUME, ML POC: 52 ML
PROT UR STRIP-MCNC: ABNORMAL MG/DL
RBC #/AREA URNS AUTO: ABNORMAL /HPF
SL AMB  POCT GLUCOSE, UA: ABNORMAL
SL AMB LEUKOCYTE ESTERASE,UA: ABNORMAL
SL AMB POCT BILIRUBIN,UA: ABNORMAL
SL AMB POCT BLOOD,UA: ABNORMAL
SL AMB POCT CLARITY,UA: CLEAR
SL AMB POCT COLOR,UA: YELLOW
SL AMB POCT KETONES,UA: ABNORMAL
SL AMB POCT NITRITE,UA: ABNORMAL
SL AMB POCT PH,UA: 5
SL AMB POCT SPECIFIC GRAVITY,UA: 1.02
SL AMB POCT URINE PROTEIN: ABNORMAL
SL AMB POCT UROBILINOGEN: ABNORMAL
SP GR UR STRIP.AUTO: 1.03 (ref 1–1.03)
UROBILINOGEN UR QL STRIP.AUTO: 0.2 E.U./DL
WBC #/AREA URNS AUTO: ABNORMAL /HPF

## 2019-08-15 PROCEDURE — 99204 OFFICE O/P NEW MOD 45 MIN: CPT | Performed by: INTERNAL MEDICINE

## 2019-08-15 PROCEDURE — 81002 URINALYSIS NONAUTO W/O SCOPE: CPT | Performed by: UROLOGY

## 2019-08-15 PROCEDURE — 99204 OFFICE O/P NEW MOD 45 MIN: CPT | Performed by: UROLOGY

## 2019-08-15 PROCEDURE — 51798 US URINE CAPACITY MEASURE: CPT | Performed by: UROLOGY

## 2019-08-15 PROCEDURE — 81001 URINALYSIS AUTO W/SCOPE: CPT | Performed by: UROLOGY

## 2019-08-15 RX ORDER — TAMSULOSIN HYDROCHLORIDE 0.4 MG/1
0.4 CAPSULE ORAL
Qty: 30 CAPSULE | Refills: 6 | Status: SHIPPED | OUTPATIENT
Start: 2019-08-15 | End: 2019-11-21 | Stop reason: SDUPTHER

## 2019-08-15 NOTE — PATIENT INSTRUCTIONS

## 2019-08-15 NOTE — PROGRESS NOTES
Consultation - [address] [facility] Shaq  40 y o  male  :1975  QBP:[id number]    Physician Requesting Consult: Irving Mcconnell DO     Reason for Consult : At your kind request I saw this patient for initial sleep evaluation today  He was diagnosed with obstructive sleep apnea in  and prescribed CPAP  He is here because he needs new equipment  The diagnostic study confirmed severe obstructive sleep apnea:  AHI 82/hour  higher while supine   Intermittent snoring of very loud intensity was noted  Minimum oxygen saturation 75 %and 45 6% of total sleep time was spent with saturations less than 90%  During the subsequent therapeutic study, sleep disordered breathing was adequately remediated with PAP at 14 cm H2O  PFSH, Problem List, Medications & Allergies were reviewed in EMR  He  has a past medical history of Diverticulitis of colon, GERD (gastroesophageal reflux disease), and Obesity  He has a current medication list which includes the following prescription(s): loratadine-pseudoephedrine, omeprazole, tamsulosin, fluticasone, indomethacin, and terbinafine  HPI:  He is using CPAP regularly and benefits from use and is not snoring or experiencing breathing difficulties  He reports episodic nasal congestion  Other Complaints: none  Restless Leg Syndrome: reports no suggestive symptoms    Parasomnia: reports teeth grinding during sleep but only when not using CPAP, but no other features of parasomnia  Sleep Routine: He works rotating shifts Typical Bedtime:  10:00 p m  Gets OOB:  5:00 a m  TIB:7 hrs  Sleep latency:< 15 minutes Sleep Interruptions:2-3/nite because of environmental disturbances  Awakens: with aid of an alarm  Upon awakening: is not always refreshed  He estimates getting 6-7 hrs sleep when working days but may get 0 or 2-3 hours when he works night  He has episodic  Excessive Daytime Sleepiness and naps when he can   [place] Sleepiness Scale rated at Total score: 14 /24  Habits: reports that he has never smoked  He has never used smokeless tobacco  , reports that he drinks alcohol  ,  reports that he does not use drugs  ,Caffeine use:limited ], Exercise routine: irregular   Family History: Mother and sister have obstructive sleep apnea  ROS: reviewed & as attached  Significant for 20-30 lb weight gain since his last studies  He uses Claritin D for nasal congestion  He reports episodic palpitations  Sit reflux is controlled on current medication  He has sees Urology for recent problems with urinary retention  EXAM:    Vitals /78   Pulse 84   Ht 5' 11" (1 803 m)   Wt 126 kg (278 lb)   SpO2 97%   BMI 38 77 kg/m²     General  Well appearing male; appears  stated age; no apparent distress  Psychiatric  Well groomed Speech:clear and coherent; Cooperative  Mental State appears normal   Head   Craniofacial anatomy: normalSinuses: non- tender  TMJ: Normal     Ears Externally appear normal      Eyes   EOM's intact;  conjunctiva/corneas clear         Nasal Airway  is patent Septum:intact; Mucosa: normal; Turbinates: normal;  No Rhinorrhea    Oral   Airway   Lips: normal; Dentition: normal ; Mucosa:moist Tongue: Modified MallampatiClass IV and Mobile;Hard Palate: normal   Oropharynx: crowded  Soft Palate:  redundant Tonsils:cryptic  PND: none   Neck   Neck Circumference: 19 5"; is thick, excess fatty tissue and short; Supple; no abnormal masses; Thyroid:normal  Trachea:central      Lymph    No Cervical or Submandibular Lymhadenopathy   Heart:   S1,S2 normal;RRR; no gallop; nomurmurs     Lungs   Respiratory Effort:normal  Air entry good bilaterally  No wheezes  No rales   Abdomen   Obese, Soft & non-tender     Extremities   No pedal edema  No clubbing or cyanosis  Skin   Skin is warm and dry; Color& Hydration good; no facial rashes or lesions    Neurologic  Alert and orientated; CNII-XII intact;  Motor normal; Sensation normal    Muscskeltl Muscle bulk, tone and power WNL Gait:normal           IMPRESSION: Primary, Secondary Sleep Diagnoses (to Medical or Psych conditions) & Comorbidities   1  Obstructive sleep apnea  Cpap DME   2  Daytime sleepiness     3  Shift work sleep disorder     4  Chronic rhinitis     5  Gastroesophageal reflux disease without esophagitis     6  Obesity (BMI 30-39  9)          PLAN:   1  I reviewed results of the Sleep study with the patient  2  With respect to above conditions, I counseled on pathophysiology, diagnosis, treatment options, risks and benefits; inter-relationship and effects on symptoms and comorbidities; risks of no treatment; costs and insurance aspects  3  Patient elected to continue positive airway pressure therapy and it is medically necessary  The patient's current unit has now reached its 5 yr reasonable, useful life and needs to be replaced  4  He declined a retitration study  I gave him a prescription for to auto titrating Pap 14-20 cm H2O   5  Nasal symptoms may improve with regular nasal saline rinse followed by topical nasal steroid if necessary  6    I advised on strategies to cope with shift work  7  I also advised on weight reduction  8  Follow-up to be scheduled after the study to review results and to initiate therapy           Sincerely,     Authenticated electronically by Demetris Silva MD   on 80/82/42   Board Certified Specialist

## 2019-08-15 NOTE — PATIENT INSTRUCTIONS
Benign Prostatic Hypertrophy   WHAT YOU NEED TO KNOW:   Benign prostatic hypertrophy (BPH) is a condition that causes your prostate gland to grow larger than normal  The prostate gland is the male sex gland that produces a fluid that is part of semen  It is about the size of a walnut and it is located under the bladder  As the prostate grows, it can squeeze the urethra  This can block urine flow and cause urinary problems  DISCHARGE INSTRUCTIONS:   Medicines:   · Alpha blockers: This medicine relaxes the muscles in your prostate and bladder  It may help you urinate more easily  · 5 alpha reductase inhibitors: These medicines block the production of a hormone that causes the prostate to get larger  It may help slow the growth of the prostate or shrink the prostate  · Take your medicine as directed  Contact your healthcare provider if you think your medicine is not helping or if you have side effects  Tell him or her if you are allergic to any medicine  Keep a list of the medicines, vitamins, and herbs you take  Include the amounts, and when and why you take them  Bring the list or the pill bottles to follow-up visits  Carry your medicine list with you in case of an emergency  Follow up with your healthcare provider as directed:  Write down your questions so you remember to ask them during your visits  Manage BPH:   · Do not let your bladder get too full before you empty it  Urinate when you feel the urge  · Limit alcohol  Do not drink large amounts of any liquid at one time  · Decrease the amount of salt you eat  Examples of salty foods are chips, cured meats, and canned soups  Do not use table salt  · Healthcare providers may tell you not to eat spicy foods such as chilli peppers  This may help you find out if spicy food makes your BPH symptoms worse  · You may have sex if you feel well  Contact your healthcare provider if:   · There is a large amount of blood in your urine  · Your signs and symptoms get worse  · You have a fever  · You have questions or concerns about your condition or care  Seek care immediately if:   · You are unable to urinate  · Your bladder feels very full and painful  © 2017 [address] [employer] Information is for End User's use only and may not be sold, redistributed or otherwise used for commercial purposes  All illustrations and images included in CareNotes® are the copyrighted property of A D A M , Inc  or Paolo [employer]aca  The above information is an  only  It is not intended as medical advice for individual conditions or treatments  Talk to your doctor, nurse or pharmacist before following any medical regimen to see if it is safe and effective for you

## 2019-08-15 NOTE — PROGRESS NOTES
Review of Systems      Genitourinary need to urinate more than twice a night   Cardiology palpitations/fluttering feeling in the chest and ankle/leg swelling   Gastrointestinal none   Neurology none   Constitutional none   Integumentary none   Psychiatry none   Musculoskeletal joint pain   Pulmonary shortness of breath with activity   ENT none   Endocrine none   Hematological none

## 2019-08-15 NOTE — PROGRESS NOTES
UROLOGY NEW CONSULT NOTE     CHIEF Wade Christine is a 40 y o  male with a complaint of   Chief Complaint   Patient presents with    Urinary Frequency       History of Present Illness:     40 y o  male   Presents with 1 year of increasing urinary symptoms of weak stream, urgency frequency and dribbling  Patient works as a  and has variable shift hours  He has started drinking more more caffeine and less than less water  Patient is very bothered by his urinary symptoms  He denies a family history of prostate cancer  AUA SS 27 QoL 6    Past Medical History:     Past Medical History:   Diagnosis Date    Diverticulitis of colon     GERD (gastroesophageal reflux disease)     Obesity        PAST SURGICAL HISTORY:   History reviewed  No pertinent surgical history  CURRENT MEDICATIONS:     Current Outpatient Medications   Medication Sig Dispense Refill    fluticasone (FLONASE) 50 mcg/act nasal spray 1 spray into each nostril daily 16 g 0    Loratadine-Pseudoephedrine (CLARITIN-D 12 HOUR PO) Take by mouth      terbinafine (LamISIL) 250 mg tablet       indomethacin (INDOCIN) 50 mg capsule Take 1 capsule (50 mg total) by mouth 2 (two) times a day with meals for 5 days 10 capsule 0    omeprazole (PriLOSEC) 20 mg delayed release capsule Take 1 capsule (20 mg total) by mouth daily for 30 days 30 capsule 5     No current facility-administered medications for this visit          ALLERGIES:     Allergies   Allergen Reactions    Azithromycin     Penicillins        SOCIAL HISTORY:     Social History     Socioeconomic History    Marital status: /Civil Union     Spouse name: None    Number of children: None    Years of education: None    Highest education level: None   Occupational History    None   Social Needs    Financial resource strain: None    Food insecurity:     Worry: None     Inability: None    Transportation needs:     Medical: None     Non-medical: None Tobacco Use    Smoking status: Never Smoker    Smokeless tobacco: Never Used    Tobacco comment: (unknown if ever smoked-as per [employer])   Substance and Sexual Activity    Alcohol use: Yes     Comment: socially    Drug use: No    Sexual activity: None   Lifestyle    Physical activity:     Days per week: None     Minutes per session: None    Stress: None   Relationships    Social connections:     Talks on phone: None     Gets together: None     Attends [employer] service: None     Active member of club or organization: None     Attends meetings of clubs or organizations: None     Relationship status: None    Intimate partner violence:     Fear of current or ex partner: None     Emotionally abused: None     Physically abused: None     Forced sexual activity: None   Other Topics Concern    None   Social History Narrative    None       SOCIAL HISTORY:     Family History   Problem Relation Age of Onset    Diabetes Mother     Stroke Father         syndrome    Stroke Maternal Grandmother         syndrome    Heart disease Paternal Grandfather     Cancer Family     Esophageal cancer Family        REVIEW OF SYSTEMS:     Review of Systems   Constitutional: Negative  Respiratory: Negative  Cardiovascular: Negative  Gastrointestinal: Negative  Genitourinary: Positive for dysuria, enuresis, frequency and urgency  Musculoskeletal: Negative  Skin: Negative  Psychiatric/Behavioral: Negative  PHYSICAL EXAM:     /80   Pulse 68   Ht 5' 11" (1 803 m)   Wt 126 kg (278 lb)   BMI 38 77 kg/m²     General:  Healthy appearing   But obese male in no acute distress  They have a normal affect  There is not appear to be any gross neurologic defects or abnormalities  HEENT:  Normocephalic, atraumatic  Neck is supple without any palpable lymphadenopathy  Cardiovascular:  Patient has normal palpable distal radial pulses  There is no significant peripheral edema   No JVD is noted   Respiratory:  Patient has unlabored respirations  There is no audible wheeze or rhonchi  Abdomen:  Abdomen is  without surgical scars  Abdomen is soft and nontender  There is no tympany  Inguinal and umbilical hernia are not appreciated  Genitourinary:  Circumcised phallus, orthotopic meatus, testicles smooth and descended, prostate is 35 g with a little bit of right-sided asymmetry but no nodule or induration    Musculoskeletal:  Patient does not have significant CVA tenderness in the  flank with palpation or percussion  They full range of motion in all 4 extremities  Strength in all 4 extremities appears congruent  Patient is able to ambulate without assistance or difficulty  Dermatologic:  Patient has no skin abnormalities or rashes  LABS:     CBC:   Lab Results   Component Value Date    WBC 7 04 07/26/2018    HGB 14 3 07/26/2018    HCT 44 8 07/26/2018    MCV 87 07/26/2018     07/26/2018       BMP:   Lab Results   Component Value Date    CALCIUM 9 1 05/04/2018    K 4 1 05/04/2018    CO2 26 05/04/2018     05/04/2018    BUN 25 05/04/2018    CREATININE 0 83 05/04/2018     No results found for: PSA    IMAGING:     No  imaging      PROCEDURE:     Recent Results (from the past 2 hour(s))   POCT urine dip    Collection Time: 08/15/19  9:12 AM   Result Value Ref Range    LEUKOCYTE ESTERASE,UA -     NITRITE,UA -     SL AMB POCT UROBILINOGEN -     POCT URINE PROTEIN -      PH,UA 5 0     BLOOD,UA trace     SPECIFIC GRAVITY,UA 1 020     KETONES,UA -     BILIRUBIN,UA -     GLUCOSE, UA -      COLOR,UA yellow     CLARITY,UA clear    POCT Measure PVR    Collection Time: 08/15/19  9:12 AM   Result Value Ref Range    POST-VOID RESIDUAL VOLUME, ML POC 52 mL        ASSESSMENT:     40 y o  male with  Lower urinary tract symptoms that are severe    PLAN:       Dip positive blood will be sent out for urinalysis with microscopy  If positive, will consider rechecking at next visit in 3 months  Rectal exam was normal today, PSA in 2 weeks  Will order his baseline screening given patient's new symptoms and blood in his urine  Patient has signs of lower urinary tract symptoms  I have recommended that he cut back on caffeine and increase his water intake  I have recommended Flomax 0 4 mg for symptomatic control  Administration and side effect profile reviewed  Patient agrees to trial   Will return to see us in 3 months

## 2019-08-16 ENCOUNTER — TELEPHONE (OUTPATIENT)
Dept: UROLOGY | Facility: CLINIC | Age: 44
End: 2019-08-16

## 2019-08-16 ENCOUNTER — TELEPHONE (OUTPATIENT)
Dept: SLEEP CENTER | Facility: CLINIC | Age: 44
End: 2019-08-16

## 2019-08-16 NOTE — TELEPHONE ENCOUNTER
----- Message from Isabela Correia MD sent at 8/16/2019 11:00 AM EDT -----  Please let the patient know, no formal blood seen in his urine

## 2019-08-16 NOTE — TELEPHONE ENCOUNTER
Left message for patient to call with DME company and to confirm that he is in process of getting CPAP

## 2019-08-16 NOTE — TELEPHONE ENCOUNTER
Called [phone] and left message for patient stating his formal urinalysis does not show any blood  Asked patient to contact office with any questions

## 2019-08-17 DIAGNOSIS — K21.9 GASTROESOPHAGEAL REFLUX DISEASE WITHOUT ESOPHAGITIS: ICD-10-CM

## 2019-08-17 RX ORDER — OMEPRAZOLE 20 MG/1
CAPSULE, DELAYED RELEASE ORAL
Qty: 30 CAPSULE | Refills: 0 | Status: SHIPPED | OUTPATIENT
Start: 2019-08-17 | End: 2019-10-15 | Stop reason: SDUPTHER

## 2019-08-21 NOTE — TELEPHONE ENCOUNTER
Patient called- states [employer] is DME provider & Alejandra Ho from [facility] has his paperwork

## 2019-08-28 ENCOUNTER — APPOINTMENT (OUTPATIENT)
Dept: LAB | Facility: MEDICAL CENTER | Age: 44
End: 2019-08-28
Payer: COMMERCIAL

## 2019-08-28 DIAGNOSIS — R35.0 URINARY FREQUENCY: ICD-10-CM

## 2019-08-28 DIAGNOSIS — R31.9 HEMATURIA, UNSPECIFIED TYPE: ICD-10-CM

## 2019-08-28 LAB — PSA SERPL-MCNC: 1.4 NG/ML (ref 0–4)

## 2019-08-28 PROCEDURE — 36415 COLL VENOUS BLD VENIPUNCTURE: CPT

## 2019-08-28 PROCEDURE — G0103 PSA SCREENING: HCPCS

## 2019-09-04 PROBLEM — H65.92 LEFT OTITIS MEDIA WITH EFFUSION: Status: ACTIVE | Noted: 2019-09-04

## 2019-09-04 PROBLEM — H90.12 CONDUCTIVE HEARING LOSS OF LEFT EAR: Status: ACTIVE | Noted: 2019-09-04

## 2019-10-14 ENCOUNTER — TELEPHONE (OUTPATIENT)
Dept: OTOLARYNGOLOGY | Facility: CLINIC | Age: 44
End: 2019-10-14

## 2019-10-14 NOTE — TELEPHONE ENCOUNTER
Kishore called and said that he is having pain in his left ear  He just had tubes put in a few weeks ago  He does not have any drops

## 2019-10-15 ENCOUNTER — OFFICE VISIT (OUTPATIENT)
Dept: OTOLARYNGOLOGY | Facility: CLINIC | Age: 44
End: 2019-10-15
Payer: COMMERCIAL

## 2019-10-15 VITALS
SYSTOLIC BLOOD PRESSURE: 130 MMHG | BODY MASS INDEX: 39.34 KG/M2 | DIASTOLIC BLOOD PRESSURE: 82 MMHG | HEART RATE: 74 BPM | HEIGHT: 71 IN | WEIGHT: 281 LBS

## 2019-10-15 DIAGNOSIS — H60.312 ACUTE DIFFUSE OTITIS EXTERNA OF LEFT EAR: Primary | ICD-10-CM

## 2019-10-15 DIAGNOSIS — K21.9 GASTROESOPHAGEAL REFLUX DISEASE WITHOUT ESOPHAGITIS: ICD-10-CM

## 2019-10-15 DIAGNOSIS — Z45.89 TYMPANOSTOMY TUBE CHECK: ICD-10-CM

## 2019-10-15 PROCEDURE — 99213 OFFICE O/P EST LOW 20 MIN: CPT | Performed by: OTOLARYNGOLOGY

## 2019-10-15 RX ORDER — OMEPRAZOLE 20 MG/1
CAPSULE, DELAYED RELEASE ORAL
Qty: 30 CAPSULE | Refills: 0 | Status: SHIPPED | OUTPATIENT
Start: 2019-10-15 | End: 2020-01-21

## 2019-10-15 RX ORDER — CIPROFLOXACIN AND DEXAMETHASONE 3; 1 MG/ML; MG/ML
4 SUSPENSION/ DROPS AURICULAR (OTIC) 2 TIMES DAILY
Qty: 7.5 ML | Refills: 0 | Status: SHIPPED | OUTPATIENT
Start: 2019-10-15 | End: 2020-05-22 | Stop reason: SDUPTHER

## 2019-10-15 NOTE — PROGRESS NOTES
Aileen Montgomery is a 40 y o male who presents for re-evaluation of left otitis media  He had a left tympanostomy tube placed in the office on September 4th  He states that this was well tolerated though starting around late last week he began to develop left ear pain and some drainage  He did not have any drops to use at home  Reports a history of frequent otitis externa in the past     Past Medical History:   Diagnosis Date    Diverticulitis of colon     GERD (gastroesophageal reflux disease)     Obesity        /82 (BP Location: Left arm, Patient Position: Sitting)   Pulse 74   Ht 5' 11" (1 803 m)   Wt 127 kg (281 lb)   BMI 39 19 kg/m²       Physical Exam   Constitutional: Oriented to person, place, and time  Well-developed and well-nourished, no apparent distress, non-toxic appearance  Cooperative, able to hear and answer questions without difficulty  Voice: Normal voice quality  Head: Normocephalic, atraumatic  No scars, masses or lesions  Face: Symmetric, no edema, no sinus tenderness  Eyes: Vision grossly intact, extra-ocular movement intact  Ears: External ear normal   Left tube appears in place and patent  There is some drainage seen in the distal canal   Bilateral tympanic membranes are intact with intact normal landmarks  No post-auricular erythema or tenderness  Nose: Septum midline, nares clear  Mucosa moist, turbinates well appearing  No crusting, polyps or discharge evident  Oral cavity: Dentition intact  Mucosa moist, lips normal   Tongue mobile, floor of mouth normal   Hard palate unremarkable  No masses or lesions  Oropharynx: Uvula is midline, soft palate normal   Unremarkable oropharyngeal inlet  Tonsils unremarkable  Posterior pharyngeal wall clear  No masses or lesions  Salivary glands:  Parotid glands and submandibular glands symmetric, no enlargement or tenderness  Neck: Normal laryngeal elevation with swallow  Trachea midline  No masses or lesions  No palpable adenopathy  Thyroid: normal in size, unremarkable without tenderness or palpable nodules  Pulmonary/Chest: Normal effort and rate  No respiratory distress  Musculoskeletal: Normal range of motion  Neurological: Cranial nerves 2-12 intact  Skin: Skin is warm and dry  Psychiatric: Normal mood and affect  A/P:  Left otitis externa on exam today  Tube is in place and patent  I recommend using Ciprodex drops for 7-10 days and re-evaluation in 2 weeks

## 2019-10-15 NOTE — PROGRESS NOTES
Review of Systems   Constitutional: Negative  HENT: Positive for ear discharge, ear pain (LEft side) and hearing loss (left ear  )  Eyes: Negative  Respiratory: Negative  Cardiovascular: Negative  Gastrointestinal: Negative  Endocrine: Negative  Genitourinary: Negative  Musculoskeletal: Negative  Skin: Negative  Allergic/Immunologic: Negative  Neurological: Negative  Hematological: Negative  Psychiatric/Behavioral: Negative

## 2019-11-01 ENCOUNTER — OFFICE VISIT (OUTPATIENT)
Dept: OTOLARYNGOLOGY | Facility: CLINIC | Age: 44
End: 2019-11-01
Payer: COMMERCIAL

## 2019-11-01 VITALS
DIASTOLIC BLOOD PRESSURE: 70 MMHG | BODY MASS INDEX: 39.06 KG/M2 | HEIGHT: 71 IN | HEART RATE: 67 BPM | SYSTOLIC BLOOD PRESSURE: 110 MMHG | WEIGHT: 279 LBS

## 2019-11-01 DIAGNOSIS — H90.12 CONDUCTIVE HEARING LOSS OF LEFT EAR, UNSPECIFIED HEARING STATUS ON CONTRALATERAL SIDE: Primary | ICD-10-CM

## 2019-11-01 DIAGNOSIS — H65.92 LEFT OTITIS MEDIA WITH EFFUSION: ICD-10-CM

## 2019-11-01 DIAGNOSIS — H60.312 ACUTE DIFFUSE OTITIS EXTERNA OF LEFT EAR: ICD-10-CM

## 2019-11-01 PROCEDURE — 99213 OFFICE O/P EST LOW 20 MIN: CPT | Performed by: OTOLARYNGOLOGY

## 2019-11-01 NOTE — PROGRESS NOTES
Karl Davis is a 40 y o male who presents for re-evaluation of left otitis media  He had a left tympanostomy tube placed in the office on September 4th  Ear pain and drainage has ceased since starting the Ciprodex 2 weeks ago  He still notices some cracking and popping his ears  Past Medical History:   Diagnosis Date    Diverticulitis of colon     GERD (gastroesophageal reflux disease)     Obesity        /70 (BP Location: Right arm, Patient Position: Sitting, Cuff Size: Large)   Pulse 67   Ht 5' 11" (1 803 m)   Wt 127 kg (279 lb)   BMI 38 91 kg/m²       Physical Exam   Constitutional: Oriented to person, place, and time  Well-developed and well-nourished, no apparent distress, non-toxic appearance  Cooperative, able to hear and answer questions without difficulty  Voice: Normal voice quality  Head: Normocephalic, atraumatic  No scars, masses or lesions  Face: Symmetric, no edema, no sinus tenderness  Eyes: Vision grossly intact, extra-ocular movement intact  Ears: External ear normal   Left tube appears in place and patent  Drainage was removed with suction  No active purulence today  Bilateral tympanic membranes are intact with intact normal landmarks  No post-auricular erythema or tenderness  Nose: Septum midline, nares clear  Mucosa moist, turbinates well appearing  No crusting, polyps or discharge evident  Oral cavity: Dentition intact  Mucosa moist, lips normal   Tongue mobile, floor of mouth normal   Hard palate unremarkable  No masses or lesions  Oropharynx: Uvula is midline, soft palate normal   Unremarkable oropharyngeal inlet  Tonsils unremarkable  Posterior pharyngeal wall clear  No masses or lesions  Salivary glands:  Parotid glands and submandibular glands symmetric, no enlargement or tenderness  Neck: Normal laryngeal elevation with swallow  Trachea midline  No masses or lesions  No palpable adenopathy    Thyroid: normal in size, unremarkable without tenderness or palpable nodules  Pulmonary/Chest: Normal effort and rate  No respiratory distress  Musculoskeletal: Normal range of motion  Neurological: Cranial nerves 2-12 intact  Skin: Skin is warm and dry  Psychiatric: Normal mood and affect  A/P:  Left otitis externa resolving on exam today  Tube is in place and patent  He will use Ciprodex for 1 more time tonight and then stop  He will return for any further drainage or his regular scheduled appointment

## 2019-11-21 ENCOUNTER — TELEPHONE (OUTPATIENT)
Dept: UROLOGY | Facility: CLINIC | Age: 44
End: 2019-11-21

## 2019-11-21 ENCOUNTER — OFFICE VISIT (OUTPATIENT)
Dept: UROLOGY | Facility: CLINIC | Age: 44
End: 2019-11-21
Payer: COMMERCIAL

## 2019-11-21 VITALS
WEIGHT: 279.98 LBS | SYSTOLIC BLOOD PRESSURE: 124 MMHG | BODY MASS INDEX: 39.2 KG/M2 | HEIGHT: 71 IN | HEART RATE: 77 BPM | DIASTOLIC BLOOD PRESSURE: 80 MMHG

## 2019-11-21 DIAGNOSIS — Z30.09 STERILIZATION CONSULT: Primary | ICD-10-CM

## 2019-11-21 DIAGNOSIS — R35.0 URINARY FREQUENCY: ICD-10-CM

## 2019-11-21 PROCEDURE — 99214 OFFICE O/P EST MOD 30 MIN: CPT | Performed by: PHYSICIAN ASSISTANT

## 2019-11-21 RX ORDER — TAMSULOSIN HYDROCHLORIDE 0.4 MG/1
0.4 CAPSULE ORAL
Qty: 90 CAPSULE | Refills: 3 | Status: SHIPPED | OUTPATIENT
Start: 2019-11-21 | End: 2020-04-27

## 2019-11-21 NOTE — PROGRESS NOTES
11/21/2019      Chief Complaint   Patient presents with    Urinary Frequency         Assessment and Plan    40 y o  male managed by Dr Beatrice Espinoza    1  Urinary frequency  - pretreatment AUA 23, now AUA 2  - tamsulosin 0 4mg qhs    2  Desires sterility  - examination with palpable b/l vas deferens today  - schedule elective vasectomy  - will need rx for sedative once procedure date established    Return for: vasectomy      History of Present Illness  Alfred Holliday is a 40 y o  male here for evaluation of three-month follow-up for LUTS  Initial AUA symptom score of 27, quality of life six  Was started on tamsulosin 0 4 mg nightly at last visit  Dip positive blood at last visit with a negative microscopy  Given onset of LUTS, a baseline screening PSA was obtained which was 1 4  Since starting the tamsulosin symptoms his urinary troubles are totally resolved  He is pleased  His new AUA score is 2  Patient also requesting evaluation for vasectomy today  3child 9year old daughter  Wife 44, patient 40  OCPs currently, wife not healthy enough to have more children and desires to get off OCPs in the near future  Discussed immediate risks and benefits of procedure and permanance of the procedure despite available reversal techniques  Will need to continue contraception for at least 8 weeks postprocedure and until confirmation of sterility from me after 2 postprocedure semen analyses at 6 and 8 weeks  Review of Systems   Constitutional: Negative for activity change, appetite change, chills, fever and unexpected weight change  HENT: Negative  Respiratory: Negative  Negative for shortness of breath  Cardiovascular: Negative  Negative for chest pain  Gastrointestinal: Negative for abdominal pain, diarrhea, nausea and vomiting  Endocrine: Negative  Genitourinary: Negative for decreased urine volume, difficulty urinating, dysuria, flank pain, frequency, hematuria and urgency     Musculoskeletal: Negative for back pain and gait problem  Skin: Negative  Allergic/Immunologic: Negative  Neurological: Negative  Hematological: Negative for adenopathy  Does not bruise/bleed easily  Urinary Incontinence Screening      Most Recent Value   Urinary Incontinence   Urinary Incontinence? No   Incomplete emptying? No   Urinary frequency? No   Urinary urgency? No   Urinary hesitancy? No   Dysuria (painful difficult urination)? No   Nocturia (waking up to use the bathroom)? No   Straining (having to push to go)? No   Weak stream?  No   Intermittent stream?  No   Post void dribbling? No             Past Medical History  Past Medical History:   Diagnosis Date    Diverticulitis of colon     GERD (gastroesophageal reflux disease)     Obesity      Past Social History  History reviewed  No pertinent surgical history    Social History     Tobacco Use   Smoking Status Never Smoker   Smokeless Tobacco Never Used   Tobacco Comment    (unknown if ever smoked-as per [employer])     Past Family History  Family History   Problem Relation Age of Onset    Diabetes Mother     Stroke Father         syndrome    Stroke Maternal Grandmother         syndrome    Heart disease Paternal Grandfather     Cancer Family     Esophageal cancer Family      Past Social history  Social History     Socioeconomic History    Marital status: /Civil Union     Spouse name: Not on file    Number of children: Not on file    Years of education: Not on file    Highest education level: Not on file   Occupational History    Not on file   Social Needs    Financial resource strain: Not on file    Food insecurity:     Worry: Not on file     Inability: Not on file    Transportation needs:     Medical: Not on file     Non-medical: Not on file   Tobacco Use    Smoking status: Never Smoker    Smokeless tobacco: Never Used    Tobacco comment: (unknown if ever smoked-as per [employer])   Substance and Sexual Activity    Alcohol use: Yes     Comment: socially    Drug use: No    Sexual activity: Not on file   Lifestyle    Physical activity:     Days per week: Not on file     Minutes per session: Not on file    Stress: Not on file   Relationships    Social connections:     Talks on phone: Not on file     Gets together: Not on file     Attends Manda[employer] service: Not on file     Active member of club or organization: Not on file     Attends meetings of clubs or organizations: Not on file     Relationship status: Not on file    Intimate partner violence:     Fear of current or ex partner: Not on file     Emotionally abused: Not on file     Physically abused: Not on file     Forced sexual activity: Not on file   Other Topics Concern    Not on file   Social History Narrative    Not on file     Current Medications  Current Outpatient Medications   Medication Sig Dispense Refill    ciprofloxacin-dexamethasone (CIPRODEX) otic suspension Administer 4 drops into the left ear 2 (two) times a day 7 5 mL 0    fluticasone (FLONASE) 50 mcg/act nasal spray 1 spray into each nostril daily 16 g 0    Loratadine-Pseudoephedrine (CLARITIN-D 12 HOUR PO) Take by mouth      omeprazole (PriLOSEC) 20 mg delayed release capsule TAKE ONE CAPSULE BY MOUTH EVERY DAY 30 capsule 0    terbinafine (LamISIL) 250 mg tablet       tamsulosin (FLOMAX) 0 4 mg Take 1 capsule (0 4 mg total) by mouth daily with dinner for 90 doses 90 capsule 3     No current facility-administered medications for this visit        Allergies  Allergies   Allergen Reactions    Azithromycin     Penicillins          The following portions of the patient's history were reviewed and updated as appropriate: allergies, current medications, past medical history, past social history, past surgical history and problem list       Vitals  Vitals:    11/21/19 1122   BP: 124/80   Pulse: 77   Weight: 127 kg (279 lb 15 8 oz)   Height: 5' 11" (1 803 m)       Physical Exam   Constitutional: He is oriented to person, place, and time  He appears well-developed and well-nourished  No distress  HENT:   Head: Normocephalic and atraumatic  Pulmonary/Chest: Effort normal    Genitourinary:   Genitourinary Comments: Circumcised phallus orthtopic meatus testes smooth descended bilaterally easily palpable cord and vas deferens bilaterally   Musculoskeletal: He exhibits no edema  Neurological: He is alert and oriented to person, place, and time  Gait normal    Skin: Skin is warm and dry  He is not diaphoretic  Psychiatric: He has a normal mood and affect  His speech is normal and behavior is normal    Nursing note and vitals reviewed  Results  No results found for this or any previous visit (from the past 1 hour(s)) ]  Lab Results   Component Value Date    PSA 1 4 08/28/2019     Lab Results   Component Value Date    CALCIUM 9 1 05/04/2018    K 4 1 05/04/2018    CO2 26 05/04/2018     05/04/2018    BUN 25 05/04/2018    CREATININE 0 83 05/04/2018     Lab Results   Component Value Date    WBC 7 04 07/26/2018    HGB 14 3 07/26/2018    HCT 44 8 07/26/2018    MCV 87 07/26/2018     07/26/2018         Orders  No orders of the defined types were placed in this encounter

## 2019-11-21 NOTE — PATIENT INSTRUCTIONS
Vasectomy   WHAT YOU NEED TO KNOW:   A vasectomy is a procedure to make you sterile  It is a permanent form of birth control  The vas deferens (sperm tubes) are cut so that the semen does not contain sperm  HOW TO PREPARE:   Before your procedure:   · Write down the correct date, time, and location of your procedure  · Arrange a ride home  Ask a family member or friend to drive you home after your surgery or procedure  Do not drive yourself home  · Ask your caregiver if you need to stop using aspirin or any other prescribed or over-the-counter medicine before your procedure or surgery  · Bring your medicine bottles or a list of your medicines when you see your caregiver  Tell your caregiver if you are allergic to any medicine  Tell your caregiver if you use any herbs, food supplements, or over-the-counter medicine  The night before your procedure:  Ask caregivers about directions for eating and drinking  The day of your procedure:   · Ask your caregiver before taking any medicine on the day of your procedure  These medicines include insulin, diabetic pills, high blood pressure pills, or heart pills  Bring a list of all the medicines you take, or your pill bottles, with you to the hospital     · You or a close family member will be asked to sign a legal document called a consent form  It gives caregivers permission to do the procedure or surgery  It also explains the problems that may happen, and your choices  Make sure all your questions are answered before you sign this form  · Caregivers may insert an intravenous tube (IV) into your vein  A vein in the arm is usually chosen  Through the IV tube, you may be given liquids and medicine  · An anesthesiologist will talk to you before your surgery  You may need medicine to keep you asleep or numb an area of your body during surgery   Tell caregivers if you or anyone in your family has had a problem with anesthesia in the past     · Bring an athletic supporter to wear after your procedure  WHAT WILL HAPPEN:   What will happen: An incision will be made on one side of your scrotum or down the middle  One of your sperm tubes will be pulled through the incision  Your surgeon will cut the sperm tube and remove a small portion of it  He may then close one or both ends with stitches or a heat treatment  He also may sew a piece of body tissue between the cut ends of your tubes  Your surgeon will then do the same procedure to your other sperm tube  Your incisions may be closed with stitches, tissue glue, or left open to heal  Germ-fighting medicine may be put on your scrotum, and the area will be covered with a bandage  After your procedure: You will be taken to a room to rest until you are fully awake  Healthcare providers will monitor you closely for any problems  Healthcare providers may apply ice and an athletic supporter to decrease swelling and bruising  Do not get out of bed until your healthcare provider says it is okay  When your healthcare provider sees that you are okay, you will be able to go home or be taken to your hospital room  CONTACT YOUR HEALTHCARE PROVIDER IF:   · You cannot make it to your procedure  · You have a fever  · You get a cold or the flu  · You have questions or concerns about your procedure  RISKS:   You may bleed more than expected  Your scrotum may be bruised or inflamed  You may get a wound, urinary tract, or epididymal infection  The epididymis is a long, curled tube on the back of your scrotum  You may feel pain when you have an erection  Granulomas may form if sperm leaks out of your cut sperm tube  Granulomas are a lump that forms under your skin  You may not become sterile if one or both of your cut sperm tubes grow back together  CARE AGREEMENT:   You have the right to help plan your care  Learn about your health condition and how it may be treated   Discuss treatment options with your caregivers to decide what care you want to receive  You always have the right to refuse treatment  © 2017 [employer] Information is for End User's use only and may not be sold, redistributed or otherwise used for commercial purposes  All illustrations and images included in CareNotes® are the copyrighted property of [employer]  or [employer]  The above information is an  only  It is not intended as medical advice for individual conditions or treatments  Talk to your doctor, nurse or pharmacist before following any medical regimen to see if it is safe and effective for you

## 2019-11-21 NOTE — TELEPHONE ENCOUNTER
The patient will check his schedule and call us back  Needs VAS scheduled and is willing to go to [facility] on a Friday or possibly when his schedule permits  Radha Suarez did consult today so he needs to be scheduled for procedure and follow up

## 2019-11-26 NOTE — TELEPHONE ENCOUNTER
I left a message on patient's cell regarding scheduling with Dr Khurram Ontiveros on December 5 or if he wants to wait until he is able to do a Friday in [address]

## 2019-12-13 ENCOUNTER — TELEPHONE (OUTPATIENT)
Dept: UROLOGY | Facility: CLINIC | Age: 44
End: 2019-12-13

## 2019-12-13 NOTE — TELEPHONE ENCOUNTER
Tried reaching patient to see if he's interested in having a vas done on January 2nd with Dr Jessica Forrest at the [place] location  I couldn't leave a message, mailbox full  He was supposed to call us back when he checked his schedule and I've called him several times but he has not called back

## 2020-01-21 DIAGNOSIS — K21.9 GASTROESOPHAGEAL REFLUX DISEASE WITHOUT ESOPHAGITIS: ICD-10-CM

## 2020-01-21 RX ORDER — OMEPRAZOLE 20 MG/1
CAPSULE, DELAYED RELEASE ORAL
Qty: 30 CAPSULE | Refills: 0 | Status: SHIPPED | OUTPATIENT
Start: 2020-01-21 | End: 2020-02-26

## 2020-02-25 ENCOUNTER — TELEPHONE (OUTPATIENT)
Dept: INTERNAL MEDICINE CLINIC | Facility: CLINIC | Age: 45
End: 2020-02-25

## 2020-02-26 DIAGNOSIS — K21.9 GASTROESOPHAGEAL REFLUX DISEASE WITHOUT ESOPHAGITIS: ICD-10-CM

## 2020-02-26 RX ORDER — OMEPRAZOLE 20 MG/1
CAPSULE, DELAYED RELEASE ORAL
Qty: 30 CAPSULE | Refills: 0 | Status: SHIPPED | OUTPATIENT
Start: 2020-02-26 | End: 2020-05-28

## 2020-03-18 ENCOUNTER — TRANSCRIBE ORDERS (OUTPATIENT)
Dept: ADMINISTRATIVE | Facility: HOSPITAL | Age: 45
End: 2020-03-18

## 2020-03-18 ENCOUNTER — APPOINTMENT (OUTPATIENT)
Dept: LAB | Facility: MEDICAL CENTER | Age: 45
End: 2020-03-18
Payer: COMMERCIAL

## 2020-03-18 DIAGNOSIS — K76.9 PLEURAL EFFUSION ASSOCIATED WITH HEPATIC DISORDER: ICD-10-CM

## 2020-03-18 DIAGNOSIS — K76.9 PLEURAL EFFUSION ASSOCIATED WITH HEPATIC DISORDER: Primary | ICD-10-CM

## 2020-03-18 DIAGNOSIS — J91.8 PLEURAL EFFUSION ASSOCIATED WITH HEPATIC DISORDER: Primary | ICD-10-CM

## 2020-03-18 DIAGNOSIS — J91.8 PLEURAL EFFUSION ASSOCIATED WITH HEPATIC DISORDER: ICD-10-CM

## 2020-03-18 LAB
ALBUMIN SERPL BCP-MCNC: 3.8 G/DL (ref 3.5–5)
ALP SERPL-CCNC: 60 U/L (ref 46–116)
ALT SERPL W P-5'-P-CCNC: 26 U/L (ref 12–78)
AST SERPL W P-5'-P-CCNC: 15 U/L (ref 5–45)
BASOPHILS # BLD AUTO: 0.04 THOUSANDS/ΜL (ref 0–0.1)
BASOPHILS NFR BLD AUTO: 0 % (ref 0–1)
BILIRUB DIRECT SERPL-MCNC: 0.12 MG/DL (ref 0–0.2)
BILIRUB SERPL-MCNC: 0.49 MG/DL (ref 0.2–1)
EOSINOPHIL # BLD AUTO: 0.3 THOUSAND/ΜL (ref 0–0.61)
EOSINOPHIL NFR BLD AUTO: 3 % (ref 0–6)
ERYTHROCYTE [DISTWIDTH] IN BLOOD BY AUTOMATED COUNT: 13.2 % (ref 11.6–15.1)
HCT VFR BLD AUTO: 45.3 % (ref 36.5–49.3)
HGB BLD-MCNC: 15 G/DL (ref 12–17)
IMM GRANULOCYTES # BLD AUTO: 0.02 THOUSAND/UL (ref 0–0.2)
IMM GRANULOCYTES NFR BLD AUTO: 0 % (ref 0–2)
LYMPHOCYTES # BLD AUTO: 2.76 THOUSANDS/ΜL (ref 0.6–4.47)
LYMPHOCYTES NFR BLD AUTO: 31 % (ref 14–44)
MCH RBC QN AUTO: 27.9 PG (ref 26.8–34.3)
MCHC RBC AUTO-ENTMCNC: 33.1 G/DL (ref 31.4–37.4)
MCV RBC AUTO: 84 FL (ref 82–98)
MONOCYTES # BLD AUTO: 0.88 THOUSAND/ΜL (ref 0.17–1.22)
MONOCYTES NFR BLD AUTO: 10 % (ref 4–12)
NEUTROPHILS # BLD AUTO: 4.91 THOUSANDS/ΜL (ref 1.85–7.62)
NEUTS SEG NFR BLD AUTO: 56 % (ref 43–75)
NRBC BLD AUTO-RTO: 0 /100 WBCS
PLATELET # BLD AUTO: 208 THOUSANDS/UL (ref 149–390)
PMV BLD AUTO: 11.6 FL (ref 8.9–12.7)
PROT SERPL-MCNC: 7.3 G/DL (ref 6.4–8.2)
RBC # BLD AUTO: 5.38 MILLION/UL (ref 3.88–5.62)
WBC # BLD AUTO: 8.91 THOUSAND/UL (ref 4.31–10.16)

## 2020-03-18 PROCEDURE — 36415 COLL VENOUS BLD VENIPUNCTURE: CPT

## 2020-03-18 PROCEDURE — 80076 HEPATIC FUNCTION PANEL: CPT

## 2020-03-18 PROCEDURE — 85025 COMPLETE CBC W/AUTO DIFF WBC: CPT

## 2020-04-27 DIAGNOSIS — R35.0 URINARY FREQUENCY: ICD-10-CM

## 2020-04-27 RX ORDER — TAMSULOSIN HYDROCHLORIDE 0.4 MG/1
0.4 CAPSULE ORAL
Qty: 90 CAPSULE | Refills: 3 | Status: SHIPPED | OUTPATIENT
Start: 2020-04-27 | End: 2020-11-25

## 2020-05-22 ENCOUNTER — OFFICE VISIT (OUTPATIENT)
Dept: OTOLARYNGOLOGY | Facility: CLINIC | Age: 45
End: 2020-05-22
Payer: COMMERCIAL

## 2020-05-22 VITALS
HEIGHT: 71 IN | BODY MASS INDEX: 39.28 KG/M2 | HEART RATE: 90 BPM | WEIGHT: 280.6 LBS | SYSTOLIC BLOOD PRESSURE: 146 MMHG | DIASTOLIC BLOOD PRESSURE: 96 MMHG

## 2020-05-22 DIAGNOSIS — H65.92 LEFT OTITIS MEDIA WITH EFFUSION: Primary | ICD-10-CM

## 2020-05-22 DIAGNOSIS — L92.9 GRANULATION TISSUE OF EAR CANAL: ICD-10-CM

## 2020-05-22 DIAGNOSIS — H60.312 ACUTE DIFFUSE OTITIS EXTERNA OF LEFT EAR: ICD-10-CM

## 2020-05-22 DIAGNOSIS — Z45.89 TYMPANOSTOMY TUBE CHECK: ICD-10-CM

## 2020-05-22 PROCEDURE — 99213 OFFICE O/P EST LOW 20 MIN: CPT | Performed by: OTOLARYNGOLOGY

## 2020-05-22 PROCEDURE — 3008F BODY MASS INDEX DOCD: CPT | Performed by: OTOLARYNGOLOGY

## 2020-05-22 PROCEDURE — 3008F BODY MASS INDEX DOCD: CPT | Performed by: INTERNAL MEDICINE

## 2020-05-22 PROCEDURE — 1036F TOBACCO NON-USER: CPT | Performed by: OTOLARYNGOLOGY

## 2020-05-22 RX ORDER — CIPROFLOXACIN AND DEXAMETHASONE 3; 1 MG/ML; MG/ML
4 SUSPENSION/ DROPS AURICULAR (OTIC) 2 TIMES DAILY
Qty: 7.5 ML | Refills: 0 | Status: SHIPPED | OUTPATIENT
Start: 2020-05-22 | End: 2021-08-05 | Stop reason: ALTCHOICE

## 2020-05-28 DIAGNOSIS — K21.9 GASTROESOPHAGEAL REFLUX DISEASE WITHOUT ESOPHAGITIS: ICD-10-CM

## 2020-05-28 RX ORDER — OMEPRAZOLE 20 MG/1
CAPSULE, DELAYED RELEASE ORAL
Qty: 30 CAPSULE | Refills: 0 | Status: SHIPPED | OUTPATIENT
Start: 2020-05-28 | End: 2020-06-10 | Stop reason: SDUPTHER

## 2020-06-05 ENCOUNTER — TELEPHONE (OUTPATIENT)
Dept: ADMINISTRATIVE | Facility: OTHER | Age: 45
End: 2020-06-05

## 2020-06-10 ENCOUNTER — TELEMEDICINE (OUTPATIENT)
Dept: INTERNAL MEDICINE CLINIC | Facility: CLINIC | Age: 45
End: 2020-06-10
Payer: COMMERCIAL

## 2020-06-10 DIAGNOSIS — K21.9 GASTROESOPHAGEAL REFLUX DISEASE WITHOUT ESOPHAGITIS: ICD-10-CM

## 2020-06-10 DIAGNOSIS — Z00.00 ANNUAL PHYSICAL EXAM: Primary | ICD-10-CM

## 2020-06-10 DIAGNOSIS — Z12.11 COLON CANCER SCREENING: ICD-10-CM

## 2020-06-10 PROCEDURE — 99396 PREV VISIT EST AGE 40-64: CPT | Performed by: INTERNAL MEDICINE

## 2020-06-10 RX ORDER — OMEPRAZOLE 20 MG/1
20 CAPSULE, DELAYED RELEASE ORAL DAILY
Qty: 30 CAPSULE | Refills: 5 | Status: SHIPPED | OUTPATIENT
Start: 2020-06-10 | End: 2021-04-02

## 2020-08-27 ENCOUNTER — APPOINTMENT (OUTPATIENT)
Dept: LAB | Facility: MEDICAL CENTER | Age: 45
End: 2020-08-27
Payer: COMMERCIAL

## 2020-08-27 ENCOUNTER — TRANSCRIBE ORDERS (OUTPATIENT)
Dept: ADMINISTRATIVE | Facility: HOSPITAL | Age: 45
End: 2020-08-27

## 2020-08-27 DIAGNOSIS — J91.8 PLEURAL EFFUSION ASSOCIATED WITH HEPATIC DISORDER: Primary | ICD-10-CM

## 2020-08-27 DIAGNOSIS — Z00.00 ANNUAL PHYSICAL EXAM: ICD-10-CM

## 2020-08-27 DIAGNOSIS — K76.9 PLEURAL EFFUSION ASSOCIATED WITH HEPATIC DISORDER: Primary | ICD-10-CM

## 2020-08-27 DIAGNOSIS — J91.8 PLEURAL EFFUSION ASSOCIATED WITH HEPATIC DISORDER: ICD-10-CM

## 2020-08-27 DIAGNOSIS — K76.9 PLEURAL EFFUSION ASSOCIATED WITH HEPATIC DISORDER: ICD-10-CM

## 2020-08-27 LAB
ALBUMIN SERPL BCP-MCNC: 3.9 G/DL (ref 3.5–5)
ALP SERPL-CCNC: 54 U/L (ref 46–116)
ALT SERPL W P-5'-P-CCNC: 34 U/L (ref 12–78)
ANION GAP SERPL CALCULATED.3IONS-SCNC: 4 MMOL/L (ref 4–13)
AST SERPL W P-5'-P-CCNC: 20 U/L (ref 5–45)
BASOPHILS # BLD AUTO: 0.05 THOUSANDS/ΜL (ref 0–0.1)
BASOPHILS NFR BLD AUTO: 1 % (ref 0–1)
BILIRUB DIRECT SERPL-MCNC: 0.11 MG/DL (ref 0–0.2)
BILIRUB SERPL-MCNC: 0.56 MG/DL (ref 0.2–1)
BUN SERPL-MCNC: 18 MG/DL (ref 5–25)
CALCIUM SERPL-MCNC: 9.3 MG/DL (ref 8.3–10.1)
CHLORIDE SERPL-SCNC: 109 MMOL/L (ref 100–108)
CHOLEST SERPL-MCNC: 212 MG/DL (ref 50–200)
CO2 SERPL-SCNC: 27 MMOL/L (ref 21–32)
CREAT SERPL-MCNC: 0.89 MG/DL (ref 0.6–1.3)
EOSINOPHIL # BLD AUTO: 0.39 THOUSAND/ΜL (ref 0–0.61)
EOSINOPHIL NFR BLD AUTO: 4 % (ref 0–6)
ERYTHROCYTE [DISTWIDTH] IN BLOOD BY AUTOMATED COUNT: 13.2 % (ref 11.6–15.1)
GFR SERPL CREATININE-BSD FRML MDRD: 103 ML/MIN/1.73SQ M
GLUCOSE SERPL-MCNC: 83 MG/DL (ref 65–140)
HCT VFR BLD AUTO: 47.6 % (ref 36.5–49.3)
HDLC SERPL-MCNC: 46 MG/DL
HGB BLD-MCNC: 15.4 G/DL (ref 12–17)
IMM GRANULOCYTES # BLD AUTO: 0.03 THOUSAND/UL (ref 0–0.2)
IMM GRANULOCYTES NFR BLD AUTO: 0 % (ref 0–2)
LDLC SERPL CALC-MCNC: 128 MG/DL (ref 0–100)
LYMPHOCYTES # BLD AUTO: 2.96 THOUSANDS/ΜL (ref 0.6–4.47)
LYMPHOCYTES NFR BLD AUTO: 30 % (ref 14–44)
MCH RBC QN AUTO: 27.8 PG (ref 26.8–34.3)
MCHC RBC AUTO-ENTMCNC: 32.4 G/DL (ref 31.4–37.4)
MCV RBC AUTO: 86 FL (ref 82–98)
MONOCYTES # BLD AUTO: 0.97 THOUSAND/ΜL (ref 0.17–1.22)
MONOCYTES NFR BLD AUTO: 10 % (ref 4–12)
NEUTROPHILS # BLD AUTO: 5.53 THOUSANDS/ΜL (ref 1.85–7.62)
NEUTS SEG NFR BLD AUTO: 55 % (ref 43–75)
NONHDLC SERPL-MCNC: 166 MG/DL
NRBC BLD AUTO-RTO: 0 /100 WBCS
PLATELET # BLD AUTO: 206 THOUSANDS/UL (ref 149–390)
PMV BLD AUTO: 11.8 FL (ref 8.9–12.7)
POTASSIUM SERPL-SCNC: 4 MMOL/L (ref 3.5–5.3)
PROT SERPL-MCNC: 7.6 G/DL (ref 6.4–8.2)
PSA SERPL-MCNC: 1.3 NG/ML (ref 0–4)
RBC # BLD AUTO: 5.54 MILLION/UL (ref 3.88–5.62)
SODIUM SERPL-SCNC: 140 MMOL/L (ref 136–145)
TRIGL SERPL-MCNC: 189 MG/DL
WBC # BLD AUTO: 9.93 THOUSAND/UL (ref 4.31–10.16)

## 2020-08-27 PROCEDURE — 84153 ASSAY OF PSA TOTAL: CPT

## 2020-08-27 PROCEDURE — 80061 LIPID PANEL: CPT

## 2020-08-27 PROCEDURE — 80053 COMPREHEN METABOLIC PANEL: CPT

## 2020-08-27 PROCEDURE — 82248 BILIRUBIN DIRECT: CPT

## 2020-08-27 PROCEDURE — 36415 COLL VENOUS BLD VENIPUNCTURE: CPT

## 2020-08-27 PROCEDURE — 85025 COMPLETE CBC W/AUTO DIFF WBC: CPT

## 2020-08-28 DIAGNOSIS — E78.49 OTHER HYPERLIPIDEMIA: Primary | ICD-10-CM

## 2020-11-06 ENCOUNTER — OFFICE VISIT (OUTPATIENT)
Dept: OTOLARYNGOLOGY | Facility: CLINIC | Age: 45
End: 2020-11-06
Payer: COMMERCIAL

## 2020-11-06 VITALS
BODY MASS INDEX: 39.48 KG/M2 | OXYGEN SATURATION: 99 % | HEIGHT: 71 IN | TEMPERATURE: 97.6 F | DIASTOLIC BLOOD PRESSURE: 72 MMHG | SYSTOLIC BLOOD PRESSURE: 116 MMHG | HEART RATE: 71 BPM | WEIGHT: 282 LBS

## 2020-11-06 DIAGNOSIS — H65.92 LEFT OTITIS MEDIA WITH EFFUSION: Primary | ICD-10-CM

## 2020-11-06 DIAGNOSIS — Z45.89 TYMPANOSTOMY TUBE CHECK: ICD-10-CM

## 2020-11-06 DIAGNOSIS — H60.312 ACUTE DIFFUSE OTITIS EXTERNA OF LEFT EAR: ICD-10-CM

## 2020-11-06 PROCEDURE — 69200 CLEAR OUTER EAR CANAL: CPT | Performed by: OTOLARYNGOLOGY

## 2020-11-06 PROCEDURE — 99213 OFFICE O/P EST LOW 20 MIN: CPT | Performed by: OTOLARYNGOLOGY

## 2020-11-06 RX ORDER — INDOMETHACIN 50 MG/1
CAPSULE ORAL
COMMUNITY
Start: 2020-08-27

## 2020-11-25 DIAGNOSIS — R35.0 URINARY FREQUENCY: ICD-10-CM

## 2020-11-25 RX ORDER — TAMSULOSIN HYDROCHLORIDE 0.4 MG/1
0.4 CAPSULE ORAL
Qty: 30 CAPSULE | Refills: 0 | Status: SHIPPED | OUTPATIENT
Start: 2020-11-25 | End: 2021-01-29

## 2021-01-29 DIAGNOSIS — R35.0 URINARY FREQUENCY: ICD-10-CM

## 2021-01-29 RX ORDER — TAMSULOSIN HYDROCHLORIDE 0.4 MG/1
0.4 CAPSULE ORAL
Qty: 30 CAPSULE | Refills: 0 | Status: SHIPPED | OUTPATIENT
Start: 2021-01-29 | End: 2021-04-02

## 2021-04-02 DIAGNOSIS — R35.0 URINARY FREQUENCY: ICD-10-CM

## 2021-04-02 DIAGNOSIS — K21.9 GASTROESOPHAGEAL REFLUX DISEASE WITHOUT ESOPHAGITIS: ICD-10-CM

## 2021-04-02 RX ORDER — TAMSULOSIN HYDROCHLORIDE 0.4 MG/1
0.4 CAPSULE ORAL
Qty: 30 CAPSULE | Refills: 0 | Status: SHIPPED | OUTPATIENT
Start: 2021-04-02 | End: 2021-06-02

## 2021-04-02 RX ORDER — OMEPRAZOLE 20 MG/1
20 CAPSULE, DELAYED RELEASE ORAL DAILY
Qty: 30 CAPSULE | Refills: 0 | Status: SHIPPED | OUTPATIENT
Start: 2021-04-02 | End: 2021-08-03

## 2021-06-02 DIAGNOSIS — R35.0 URINARY FREQUENCY: ICD-10-CM

## 2021-06-02 RX ORDER — TAMSULOSIN HYDROCHLORIDE 0.4 MG/1
0.4 CAPSULE ORAL
Qty: 30 CAPSULE | Refills: 0 | Status: SHIPPED | OUTPATIENT
Start: 2021-06-02 | End: 2021-10-04

## 2021-07-21 ENCOUNTER — TELEPHONE (OUTPATIENT)
Dept: INTERNAL MEDICINE CLINIC | Facility: CLINIC | Age: 46
End: 2021-07-21

## 2021-08-03 ENCOUNTER — TELEPHONE (OUTPATIENT)
Dept: INTERNAL MEDICINE CLINIC | Facility: CLINIC | Age: 46
End: 2021-08-03

## 2021-08-03 DIAGNOSIS — E78.2 MIXED HYPERLIPIDEMIA: Primary | ICD-10-CM

## 2021-08-03 DIAGNOSIS — K21.9 GASTROESOPHAGEAL REFLUX DISEASE WITHOUT ESOPHAGITIS: ICD-10-CM

## 2021-08-03 DIAGNOSIS — R73.9 HYPERGLYCEMIA: ICD-10-CM

## 2021-08-03 RX ORDER — OMEPRAZOLE 20 MG/1
20 CAPSULE, DELAYED RELEASE ORAL DAILY
Qty: 30 CAPSULE | Refills: 0 | Status: SHIPPED | OUTPATIENT
Start: 2021-08-03 | End: 2021-11-04

## 2021-08-04 ENCOUNTER — APPOINTMENT (OUTPATIENT)
Dept: LAB | Facility: MEDICAL CENTER | Age: 46
End: 2021-08-04
Payer: COMMERCIAL

## 2021-08-04 DIAGNOSIS — E78.2 MIXED HYPERLIPIDEMIA: ICD-10-CM

## 2021-08-04 LAB
ALBUMIN SERPL BCP-MCNC: 3.6 G/DL (ref 3.5–5)
ALP SERPL-CCNC: 55 U/L (ref 46–116)
ALT SERPL W P-5'-P-CCNC: 30 U/L (ref 12–78)
ANION GAP SERPL CALCULATED.3IONS-SCNC: 4 MMOL/L (ref 4–13)
AST SERPL W P-5'-P-CCNC: 16 U/L (ref 5–45)
BILIRUB SERPL-MCNC: 0.48 MG/DL (ref 0.2–1)
BUN SERPL-MCNC: 17 MG/DL (ref 5–25)
CALCIUM SERPL-MCNC: 9.1 MG/DL (ref 8.3–10.1)
CHLORIDE SERPL-SCNC: 106 MMOL/L (ref 100–108)
CHOLEST SERPL-MCNC: 186 MG/DL (ref 50–200)
CO2 SERPL-SCNC: 29 MMOL/L (ref 21–32)
CREAT SERPL-MCNC: 0.88 MG/DL (ref 0.6–1.3)
GFR SERPL CREATININE-BSD FRML MDRD: 103 ML/MIN/1.73SQ M
GLUCOSE P FAST SERPL-MCNC: 101 MG/DL (ref 65–99)
HDLC SERPL-MCNC: 38 MG/DL
LDLC SERPL CALC-MCNC: 127 MG/DL (ref 0–100)
NONHDLC SERPL-MCNC: 148 MG/DL
POTASSIUM SERPL-SCNC: 4.2 MMOL/L (ref 3.5–5.3)
PROT SERPL-MCNC: 7.3 G/DL (ref 6.4–8.2)
SODIUM SERPL-SCNC: 139 MMOL/L (ref 136–145)
TRIGL SERPL-MCNC: 103 MG/DL

## 2021-08-04 PROCEDURE — 80061 LIPID PANEL: CPT

## 2021-08-04 PROCEDURE — 36415 COLL VENOUS BLD VENIPUNCTURE: CPT

## 2021-08-04 PROCEDURE — 80053 COMPREHEN METABOLIC PANEL: CPT

## 2021-08-05 ENCOUNTER — OFFICE VISIT (OUTPATIENT)
Dept: INTERNAL MEDICINE CLINIC | Facility: CLINIC | Age: 46
End: 2021-08-05
Payer: COMMERCIAL

## 2021-08-05 VITALS
TEMPERATURE: 97.3 F | BODY MASS INDEX: 38.64 KG/M2 | WEIGHT: 276 LBS | DIASTOLIC BLOOD PRESSURE: 78 MMHG | SYSTOLIC BLOOD PRESSURE: 126 MMHG | HEIGHT: 71 IN

## 2021-08-05 DIAGNOSIS — R07.89 CHEST PRESSURE: ICD-10-CM

## 2021-08-05 DIAGNOSIS — J45.20 MILD INTERMITTENT ASTHMA WITHOUT COMPLICATION: ICD-10-CM

## 2021-08-05 DIAGNOSIS — Z00.00 ANNUAL PHYSICAL EXAM: Primary | ICD-10-CM

## 2021-08-05 PROCEDURE — 3008F BODY MASS INDEX DOCD: CPT | Performed by: INTERNAL MEDICINE

## 2021-08-05 PROCEDURE — 1036F TOBACCO NON-USER: CPT | Performed by: INTERNAL MEDICINE

## 2021-08-05 PROCEDURE — 3725F SCREEN DEPRESSION PERFORMED: CPT | Performed by: INTERNAL MEDICINE

## 2021-08-05 PROCEDURE — 99396 PREV VISIT EST AGE 40-64: CPT | Performed by: INTERNAL MEDICINE

## 2021-08-05 NOTE — PROGRESS NOTES
ADULT ANNUAL Celestina Frances  INTERNAL MEDICINE AT [facility]    NAME: Rissa Chiang  AGE: 55 y o  SEX: male  : 1975     DATE: 2021     Assessment and Plan:     Problem List Items Addressed This Visit        Respiratory    Asthma       Other    Annual physical exam - Primary      Other Visit Diagnoses     Chest pressure        Relevant Orders    Stress test only, exercise      Rx for fbw  Weight loss dicussed and encouraged improved diet   Increase hydration  Pt is vaccinated   Exercise stress test   Annual/prn       Immunizations and preventive care screenings were discussed with patient today  Appropriate education was printed on patient's after visit summary  Counseling:  · Exercise: the importance of regular exercise/physical activity was discussed  Recommend exercise 3-5 times per week for at least 30 minutes  BMI Counseling: Body mass index is 38 49 kg/m²  The BMI is above normal  Nutrition recommendations include moderation in carbohydrate intake and increasing intake of lean protein  Exercise recommendations include exercising 3-5 times per week  No pharmacotherapy was ordered  Return in about 1 year (around 2022), or if symptoms worsen or fail to improve, for Recheck  Chief Complaint:     Chief Complaint   Patient presents with    Annual Exam      History of Present Illness:     Adult Annual Physical   Patient here for a comprehensive physical exam  The patient reports problems - Pt generally well Schedule still hectic No acute issues Schedule affects sleep and eating habits   Diet and Physical Activity  · Diet/Nutrition: poor diet  · Exercise: 5-7 times a week on average        Depression Screening  PHQ-9 Depression Screening    PHQ-9:   Frequency of the following problems over the past two weeks:      Little interest or pleasure in doing things: 0 - not at all  Feeling down, depressed, or hopeless: 0 - not at all  PHQ-2 Score: 0       General Health  · Sleep: gets 4-6 hours of sleep on average  · Hearing: normal - bilateral   · Vision: no vision problems  · Dental: regular dental visits   Health  · Symptoms include: nocturia     Review of Systems:     Review of Systems   Constitutional: Negative  HENT: Negative  Respiratory: Negative  Cardiovascular: Negative  Gastrointestinal: Negative  Genitourinary: Negative  Musculoskeletal: Negative  Skin: Negative  Neurological: Negative  Hematological: Negative  Psychiatric/Behavioral: Negative  Past Medical History:     Past Medical History:   Diagnosis Date    Diverticulitis of colon     GERD (gastroesophageal reflux disease)     Obesity       Past Surgical History:     History reviewed  No pertinent surgical history     Family History:     Family History   Problem Relation Age of Onset    Diabetes Mother     Stroke Father         syndrome    Stroke Maternal Grandmother         syndrome    Heart disease Paternal Grandfather     Cancer Family     Esophageal cancer Family       Social History:     Social History     Socioeconomic History    Marital status: /Civil Union     Spouse name: None    Number of children: None    Years of education: None    Highest education level: None   Occupational History    None   Tobacco Use    Smoking status: Never Smoker    Smokeless tobacco: Never Used    Tobacco comment: (unknown if ever smoked-as per [employer])   Vaping Use    Vaping Use: Never used   Substance and Sexual Activity    Alcohol use: Yes     Comment: socially    Drug use: No    Sexual activity: None   Other Topics Concern    None   Social History Narrative    None     Social Determinants of Health     Financial Resource Strain:     Difficulty of Paying Living Expenses:    Food Insecurity:     Worried About Running Out of Food in the Last Year:     Ran Out of Food in the Last Year:    Transportation Needs:     Lack of Transportation (Medical):  Lack of Transportation (Non-Medical):    Physical Activity:     Days of Exercise per Week:     Minutes of Exercise per Session:    Stress:     Feeling of Stress :    Social Connections:     Frequency of Communication with Friends and Family:     Frequency of Social Gatherings with Friends and Family:     Attends Buddhism Services:     Active Member of Clubs or Organizations:     Attends Club or Organization Meetings:     Marital Status:    Intimate Partner Violence:     Fear of Current or Ex-Partner:     Emotionally Abused:     Physically Abused:     Sexually Abused:       Current Medications:     Current Outpatient Medications   Medication Sig Dispense Refill    fluticasone (FLONASE) 50 mcg/act nasal spray 1 spray into each nostril daily 16 g 0    indomethacin (INDOCIN) 50 mg capsule       Loratadine-Pseudoephedrine (CLARITIN-D 12 HOUR PO) Take by mouth      omeprazole (PriLOSEC) 20 mg delayed release capsule Take 1 capsule (20 mg total) by mouth daily 30 capsule 0    tamsulosin (FLOMAX) 0 4 mg Take 1 capsule (0 4 mg total) by mouth daily with dinner for 90 doses 30 capsule 0     No current facility-administered medications for this visit  Allergies: Allergies   Allergen Reactions    Azithromycin     Penicillins       Physical Exam:     /78   Temp (!) 97 3 °F (36 3 °C) (Temporal)   Ht 5' 11" (1 803 m)   Wt 125 kg (276 lb)   BMI 38 49 kg/m²     Physical Exam  Constitutional:       General: He is not in acute distress  Appearance: Normal appearance  He is not ill-appearing, toxic-appearing or diaphoretic  HENT:      Head: Normocephalic and atraumatic  Right Ear: Tympanic membrane, ear canal and external ear normal  There is no impacted cerumen  Left Ear: Tympanic membrane, ear canal and external ear normal  There is no impacted cerumen  Nose: Nose normal       Mouth/Throat:      Mouth: Mucous membranes are dry     Eyes: General: No scleral icterus  Extraocular Movements: Extraocular movements intact  Conjunctiva/sclera: Conjunctivae normal       Pupils: Pupils are equal, round, and reactive to light  Cardiovascular:      Rate and Rhythm: Normal rate and regular rhythm  Pulses: Normal pulses  Heart sounds: Normal heart sounds  No murmur heard  Pulmonary:      Effort: Pulmonary effort is normal  No respiratory distress  Breath sounds: Normal breath sounds  No wheezing  Abdominal:      General: Bowel sounds are normal  There is no distension  Palpations: Abdomen is soft  Tenderness: There is no abdominal tenderness  Musculoskeletal:         General: Normal range of motion  Cervical back: Normal range of motion and neck supple  No rigidity or tenderness  Right lower leg: No edema  Left lower leg: No edema  Lymphadenopathy:      Cervical: No cervical adenopathy  Skin:     General: Skin is dry  Coloration: Skin is not jaundiced or pale  Neurological:      General: No focal deficit present  Mental Status: He is alert and oriented to person, place, and time  Mental status is at baseline  Cranial Nerves: No cranial nerve deficit  Motor: No weakness  Psychiatric:         Mood and Affect: Mood normal          Behavior: Behavior normal          Thought Content:  Thought content normal          Judgment: Judgment normal           Neyda Flow, DO  [facility] INTERNAL MEDICINE AT [facility]

## 2021-08-05 NOTE — PATIENT INSTRUCTIONS

## 2021-08-17 ENCOUNTER — TELEPHONE (OUTPATIENT)
Dept: FAMILY MEDICINE CLINIC | Facility: CLINIC | Age: 46
End: 2021-08-17

## 2021-08-17 ENCOUNTER — HOSPITAL ENCOUNTER (OUTPATIENT)
Dept: NON INVASIVE DIAGNOSTICS | Facility: HOSPITAL | Age: 46
Discharge: HOME | End: 2021-08-17
Attending: INTERNAL MEDICINE
Payer: COMMERCIAL

## 2021-08-17 DIAGNOSIS — B34.9 VIRAL SYNDROME: Primary | ICD-10-CM

## 2021-08-17 DIAGNOSIS — R07.89 CHEST PRESSURE: ICD-10-CM

## 2021-08-17 PROCEDURE — 93018 CV STRESS TEST I&R ONLY: CPT | Performed by: INTERNAL MEDICINE

## 2021-08-17 PROCEDURE — 93016 CV STRESS TEST SUPVJ ONLY: CPT | Performed by: INTERNAL MEDICINE

## 2021-08-17 PROCEDURE — 93017 CV STRESS TEST TRACING ONLY: CPT

## 2021-08-17 RX ORDER — VALACYCLOVIR HYDROCHLORIDE 1 G/1
1000 TABLET, FILM COATED ORAL 2 TIMES DAILY
Qty: 14 TABLET | Refills: 3 | Status: SHIPPED | OUTPATIENT
Start: 2021-08-17 | End: 2022-05-06

## 2021-10-04 DIAGNOSIS — R35.0 URINARY FREQUENCY: ICD-10-CM

## 2021-10-04 RX ORDER — TAMSULOSIN HYDROCHLORIDE 0.4 MG/1
0.4 CAPSULE ORAL
Qty: 30 CAPSULE | Refills: 0 | Status: SHIPPED | OUTPATIENT
Start: 2021-10-04 | End: 2021-12-03

## 2021-12-03 DIAGNOSIS — R35.0 URINARY FREQUENCY: ICD-10-CM

## 2021-12-03 RX ORDER — TAMSULOSIN HYDROCHLORIDE 0.4 MG/1
0.4 CAPSULE ORAL
Qty: 30 CAPSULE | Refills: 0 | Status: SHIPPED | OUTPATIENT
Start: 2021-12-03 | End: 2022-02-01

## 2022-02-01 DIAGNOSIS — R35.0 URINARY FREQUENCY: ICD-10-CM

## 2022-02-01 RX ORDER — TAMSULOSIN HYDROCHLORIDE 0.4 MG/1
CAPSULE ORAL
Qty: 30 CAPSULE | Refills: 0 | Status: SHIPPED | OUTPATIENT
Start: 2022-02-01 | End: 2022-03-02

## 2022-02-21 ENCOUNTER — TELEPHONE (OUTPATIENT)
Dept: FAMILY MEDICINE CLINIC | Facility: CLINIC | Age: 47
End: 2022-02-21

## 2022-02-21 NOTE — TELEPHONE ENCOUNTER
If someone has availability today can schedule as I am not in the office or doing appts today  Not sure what the rest of the week has in terms of openings if he chooses to wait likely would be later in the week as tomorrow looks busy too

## 2022-02-21 NOTE — TELEPHONE ENCOUNTER
Pt has sinus congestion for weeks, using claritin and flonase but not helping, please advise, in person appt, virtual or referral to ENT

## 2022-03-02 ENCOUNTER — OFFICE VISIT (OUTPATIENT)
Dept: FAMILY MEDICINE CLINIC | Facility: CLINIC | Age: 47
End: 2022-03-02
Payer: COMMERCIAL

## 2022-03-02 VITALS
DIASTOLIC BLOOD PRESSURE: 78 MMHG | SYSTOLIC BLOOD PRESSURE: 136 MMHG | WEIGHT: 280.2 LBS | BODY MASS INDEX: 39.23 KG/M2 | RESPIRATION RATE: 18 BRPM | HEART RATE: 76 BPM | TEMPERATURE: 98.2 F | HEIGHT: 71 IN

## 2022-03-02 DIAGNOSIS — J01.90 SUBACUTE SINUSITIS, UNSPECIFIED LOCATION: Primary | ICD-10-CM

## 2022-03-02 DIAGNOSIS — J45.20 MILD INTERMITTENT ASTHMA WITHOUT COMPLICATION: ICD-10-CM

## 2022-03-02 DIAGNOSIS — E66.9 OBESITY (BMI 30-39.9): ICD-10-CM

## 2022-03-02 DIAGNOSIS — M10.9 ACUTE GOUT, UNSPECIFIED CAUSE, UNSPECIFIED SITE: ICD-10-CM

## 2022-03-02 DIAGNOSIS — R35.0 URINARY FREQUENCY: ICD-10-CM

## 2022-03-02 PROCEDURE — 1036F TOBACCO NON-USER: CPT | Performed by: INTERNAL MEDICINE

## 2022-03-02 PROCEDURE — 3008F BODY MASS INDEX DOCD: CPT | Performed by: INTERNAL MEDICINE

## 2022-03-02 PROCEDURE — 99214 OFFICE O/P EST MOD 30 MIN: CPT | Performed by: INTERNAL MEDICINE

## 2022-03-02 RX ORDER — TAMSULOSIN HYDROCHLORIDE 0.4 MG/1
CAPSULE ORAL
Qty: 30 CAPSULE | Refills: 0 | Status: SHIPPED | OUTPATIENT
Start: 2022-03-02 | End: 2022-04-06

## 2022-03-02 RX ORDER — AZELASTINE 1 MG/ML
1 SPRAY, METERED NASAL 2 TIMES DAILY
Qty: 30 ML | Refills: 1 | Status: SHIPPED | OUTPATIENT
Start: 2022-03-02

## 2022-03-02 NOTE — PROGRESS NOTES
Assessment/Plan:         Diagnoses and all orders for this visit:    Subacute sinusitis, unspecified location  -     azelastine (ASTELIN) 0 1 % nasal spray; 1 spray into each nostril 2 (two) times a day Use in each nostril as directed  Has humidified cpap  Increase water intake Use saline nasal spray     Obesity (BMI 30-39 9)  -     Ambulatory Referral to Weight Management; Future  Pt wants to lose weight and is agreeable to weight mgmt eval     Mild intermittent asthma without complication  Sxs overall seem managed His nasal sxs are bothersome but not causing sob or wheeze  Acute gout, unspecified cause, unspecified site  -     Uric acid; Future  Check with upcoming labs       Annual scheduled/prn     Patient ID: Bassam Rhodes is a 52 y o  male  HPI  Pt complaint of nasal congestion for weeks and stuffiness affecting his sleep He uses cpap and it is humidified he started flonase recently and has had some benefit with that No cough No wheeze no sob Very dry mouth He is concerned about his weight as well and wants to work on that ?recurrent gout not necessarily now but would like to check uric acid   He is planning to r/s appt with urology for vasectomy      Review of Systems   Constitutional: Positive for fatigue  Negative for chills and fever  HENT: Positive for congestion and sinus pressure  Eyes: Negative for visual disturbance  Respiratory: Negative for cough, shortness of breath and wheezing  Cardiovascular: Negative  Gastrointestinal: Negative  Negative for abdominal distention and abdominal pain  Genitourinary: Positive for frequency  Neurological: Positive for headaches  Negative for dizziness and light-headedness  Psychiatric/Behavioral: Positive for sleep disturbance  The patient is not nervous/anxious  Past Medical History:   Diagnosis Date    Diverticulitis of colon     GERD (gastroesophageal reflux disease)     Obesity      History reviewed   No pertinent surgical history  Social History     Socioeconomic History    Marital status: /Civil Union     Spouse name: Not on file    Number of children: Not on file    Years of education: Not on file    Highest education level: Not on file   Occupational History    Not on file   Tobacco Use    Smoking status: Never Smoker    Smokeless tobacco: Never Used    Tobacco comment: (unknown if ever smoked-as per [employer])   Vaping Use    Vaping Use: Never used   Substance and Sexual Activity    Alcohol use: Yes     Comment: socially    Drug use: No    Sexual activity: Not on file   Other Topics Concern    Not on file   Social History Narrative    Not on file     Social Determinants of Health     Financial Resource Strain: Not on file   Food Insecurity: Not on file   Transportation Needs: Not on file   Physical Activity: Not on file   Stress: Not on file   Social Connections: Not on file   Intimate Partner Violence: Not on file   Housing Stability: Not on file     Allergies   Allergen Reactions    Azithromycin     Penicillins      BMI Counseling: Body mass index is 39 08 kg/m²  The BMI is above normal  Nutrition recommendations include consuming healthier snacks, moderation in carbohydrate intake and increasing intake of lean protein  Exercise recommendations include exercising 3-5 times per week  Rationale for BMI follow-up plan is due to patient being overweight or obese  /78   Pulse 76   Temp 98 2 °F (36 8 °C)   Resp 18   Ht 5' 11" (1 803 m)   Wt 127 kg (280 lb 3 2 oz)   BMI 39 08 kg/m²          Physical Exam  Vitals reviewed  Constitutional:       General: He is not in acute distress  Appearance: Normal appearance  He is not ill-appearing, toxic-appearing or diaphoretic  HENT:      Head: Normocephalic and atraumatic  Right Ear: External ear normal       Left Ear: External ear normal       Nose: Nose normal       Mouth/Throat:      Mouth: Mucous membranes are dry     Eyes: General: No scleral icterus  Extraocular Movements: Extraocular movements intact  Conjunctiva/sclera: Conjunctivae normal       Pupils: Pupils are equal, round, and reactive to light  Cardiovascular:      Rate and Rhythm: Normal rate and regular rhythm  Pulses: Normal pulses  Heart sounds: Normal heart sounds  Pulmonary:      Effort: Pulmonary effort is normal  No respiratory distress  Breath sounds: Normal breath sounds  No wheezing  Abdominal:      General: Bowel sounds are normal  There is no distension  Palpations: Abdomen is soft  Tenderness: There is no abdominal tenderness  Musculoskeletal:      Cervical back: Normal range of motion and neck supple  No rigidity  Right lower leg: No edema  Left lower leg: No edema  Lymphadenopathy:      Cervical: No cervical adenopathy  Skin:     General: Skin is dry  Neurological:      General: No focal deficit present  Mental Status: He is alert and oriented to person, place, and time  Mental status is at baseline  Psychiatric:         Mood and Affect: Mood normal          Behavior: Behavior normal          Thought Content:  Thought content normal          Judgment: Judgment normal

## 2022-04-06 ENCOUNTER — TELEMEDICINE (OUTPATIENT)
Dept: FAMILY MEDICINE CLINIC | Facility: CLINIC | Age: 47
End: 2022-04-06
Payer: COMMERCIAL

## 2022-04-06 ENCOUNTER — TELEPHONE (OUTPATIENT)
Dept: FAMILY MEDICINE CLINIC | Facility: CLINIC | Age: 47
End: 2022-04-06

## 2022-04-06 VITALS — WEIGHT: 280 LBS | BODY MASS INDEX: 39.2 KG/M2 | HEIGHT: 71 IN

## 2022-04-06 DIAGNOSIS — U07.1 COVID-19: Primary | ICD-10-CM

## 2022-04-06 DIAGNOSIS — R35.0 URINARY FREQUENCY: ICD-10-CM

## 2022-04-06 PROCEDURE — 3725F SCREEN DEPRESSION PERFORMED: CPT | Performed by: INTERNAL MEDICINE

## 2022-04-06 PROCEDURE — 99213 OFFICE O/P EST LOW 20 MIN: CPT | Performed by: INTERNAL MEDICINE

## 2022-04-06 PROCEDURE — 3008F BODY MASS INDEX DOCD: CPT | Performed by: INTERNAL MEDICINE

## 2022-04-06 PROCEDURE — 1036F TOBACCO NON-USER: CPT | Performed by: INTERNAL MEDICINE

## 2022-04-06 RX ORDER — TAMSULOSIN HYDROCHLORIDE 0.4 MG/1
CAPSULE ORAL
Qty: 30 CAPSULE | Refills: 0 | Status: SHIPPED | OUTPATIENT
Start: 2022-04-06 | End: 2022-05-24 | Stop reason: SDUPTHER

## 2022-04-06 RX ORDER — ALBUTEROL SULFATE 90 UG/1
2 AEROSOL, METERED RESPIRATORY (INHALATION) EVERY 6 HOURS PRN
Qty: 6.7 G | Refills: 5 | Status: SHIPPED | OUTPATIENT
Start: 2022-04-06

## 2022-04-06 NOTE — TELEPHONE ENCOUNTER
Pt states he tested positive for covid last Thursday, 3/31/22  Took 2 home tests, which were both positive  States he mostly feels recovered besides some SOB and chest congestion  Asking if he would be able to have a virtual appt with you to go over his sxs and recommendations?

## 2022-04-06 NOTE — PROGRESS NOTES
COVID-19 Outpatient Progress Note    Assessment/Plan:    Problem List Items Addressed This Visit     None      Visit Diagnoses     COVID-19    -  Primary    Relevant Medications    albuterol (Proventil HFA) 90 mcg/act inhaler       Pt is day 8 since sxs onset today and continues to improve   Start mucinex and use inhaler prn   Rest - he is next scheduled to work Saturday so anticipate rtw Saturday       Pt tested positive after sxs onset last Wednesday Seems to have been small outbreak at [facility] with 8 people at least testing positive His wife tested positive yesterday He had initial series but not boosted He is feeling better each day Some mild abrams He was off work the first three days of this week and scheduled return now is Saturday which he feels he will be able to do   Disposition:     Patient is fully vaccinated and I recommended self quarantine for 5 days followed by strict mask use for an additional 5 days  If patient were to develop symptoms, they should immediately self isolate and call our office for further guidance  I have spent 10 minutes directly with the patient  Greater than 50% of this time was spent in counseling/coordination of care regarding: diagnostic results and instructions for management  Encounter provider Nette Boles DO    Provider located at [address] [address]    Recent Visits  No visits were found meeting these conditions  Showing recent visits within past 7 days and meeting all other requirements  Today's Visits  Date Type Provider Dept   04/06/22 [address] [facility]) Primary Care   04/06/22 Telephone Emma Paulson Primary Care   Showing today's visits and meeting all other requirements  Future Appointments  No visits were found meeting these conditions    Showing future appointments within next 150 days and meeting all other requirements     This virtual check-in was done via [employer] and patient was informed that this is a secure, HIPAA-compliant platform  He agrees to proceed  Patient agrees to participate in a virtual check in via telephone or video visit instead of presenting to the office to address urgent/immediate medical needs  Patient is aware this is a billable service  After connecting through [facility], the patient was identified by name and date of birth  Cecilia Adkins was informed that this was a telemedicine visit and that the exam was being conducted confidentially over secure lines  My office door was closed  No one else was in the room  Cecilia Adkins acknowledged consent and understanding of privacy and security of the telemedicine visit  I informed the patient that I have reviewed his record in Epic and presented the opportunity for him to ask any questions regarding the visit today  The patient agreed to participate  Verification of patient location:  Patient is located in the following state in which I hold an active license: [place]    Subjective:   Cecilia Adkins is a 52 y o  male who is concerned about COVID-19   Patient's symptoms include nasal congestion      - Date of symptom onset: 3/30/2022      COVID-19 vaccination status: Fully vaccinated (primary series)    Exposure:   Contact with a person who is under investigation (PUI) for or who is positive for COVID-19 within the last 14 days?: Yes    Hospitalized recently for fever and/or lower respiratory symptoms?: No      Currently a healthcare worker that is involved in direct patient care?: Yes      Works in a special setting where the risk of COVID-19 transmission may be high? (this may include long-term care, correctional and longterm facilities; homeless shelters; assisted-living facilities and group homes ): No      Resident in a special setting where the risk of COVID-19 transmission may be high? (this may include long-term care, correctional and longterm facilities; homeless shelters; assisted-living facilities and group homes ): No      No results found for: [place], [address] [facility], [address] [facility], [address] [facility] [address], [address] [facility], [address] [facility]  Past Medical History:   Diagnosis Date    Diverticulitis of colon     GERD (gastroesophageal reflux disease)     Obesity      No past surgical history on file  Current Outpatient Medications   Medication Sig Dispense Refill    azelastine (ASTELIN) 0 1 % nasal spray 1 spray into each nostril 2 (two) times a day Use in each nostril as directed 30 mL 1    fluticasone (FLONASE) 50 mcg/act nasal spray 1 spray into each nostril daily 16 g 0    indomethacin (INDOCIN) 50 mg capsule       Loratadine-Pseudoephedrine (CLARITIN-D 12 HOUR PO) Take by mouth      omeprazole (PriLOSEC) 20 mg delayed release capsule Take 1 capsule (20 mg total) by mouth daily 30 capsule 5    tamsulosin (FLOMAX) 0 4 mg Take 1 capsule (0 4 mg total) by mouth daily with dinner  30 capsule 0    albuterol (Proventil HFA) 90 mcg/act inhaler Inhale 2 puffs every 6 (six) hours as needed for wheezing 6 7 g 5    valACYclovir (VALTREX) 1,000 mg tablet Take 1 tablet (1,000 mg total) by mouth 2 (two) times a day for 7 days 14 tablet 3     No current facility-administered medications for this visit  Allergies   Allergen Reactions    Azithromycin     Penicillins        Review of Systems   HENT: Positive for congestion  Past Medical History:   Diagnosis Date    Diverticulitis of colon     GERD (gastroesophageal reflux disease)     Obesity      No past surgical history on file    Social History     Socioeconomic History    Marital status: /Civil Union     Spouse name: Not on file    Number of children: Not on file    Years of education: Not on file    Highest education level: Not on file   Occupational History    Not on file   Tobacco Use    Smoking status: Never Smoker    Smokeless tobacco: Never Used    Tobacco comment: (unknown if ever smoked-as per [employer]) Vaping Use    Vaping Use: Never used   Substance and Sexual Activity    Alcohol use: Yes     Comment: socially    Drug use: No    Sexual activity: Not on file   Other Topics Concern    Not on file   Social History Narrative    Not on file     Social Determinants of Health     Financial Resource Strain: Not on file   Food Insecurity: Not on file   Transportation Needs: Not on file   Physical Activity: Not on file   Stress: Not on file   Social Connections: Not on file   Intimate Partner Violence: Not on file   Housing Stability: Not on file     Allergies   Allergen Reactions    Azithromycin     Penicillins          Vitals:    04/06/22 1043   Weight: 127 kg (280 lb)   Height: 5' 11" (1 803 m)       Physical Exam    VIRTUAL VISIT Henry Johnson verbally agrees to participate in [employer]  Pt is aware that [employer] could be limited without vital signs or the ability to perform a full hands-on physical Junior León understands he or the provider may request at any time to terminate the video visit and request the patient to seek care or treatment in person

## 2022-05-06 ENCOUNTER — OFFICE VISIT (OUTPATIENT)
Dept: UROLOGY | Facility: CLINIC | Age: 47
End: 2022-05-06
Payer: COMMERCIAL

## 2022-05-06 VITALS
SYSTOLIC BLOOD PRESSURE: 132 MMHG | WEIGHT: 276 LBS | BODY MASS INDEX: 38.64 KG/M2 | HEART RATE: 64 BPM | DIASTOLIC BLOOD PRESSURE: 70 MMHG | HEIGHT: 71 IN

## 2022-05-06 DIAGNOSIS — R31.29 MICROSCOPIC HEMATURIA: ICD-10-CM

## 2022-05-06 DIAGNOSIS — R35.0 URINARY FREQUENCY: Primary | ICD-10-CM

## 2022-05-06 DIAGNOSIS — Z12.5 PROSTATE CANCER SCREENING: ICD-10-CM

## 2022-05-06 DIAGNOSIS — N13.8 BPH WITH OBSTRUCTION/LOWER URINARY TRACT SYMPTOMS: ICD-10-CM

## 2022-05-06 DIAGNOSIS — N40.1 BPH WITH OBSTRUCTION/LOWER URINARY TRACT SYMPTOMS: ICD-10-CM

## 2022-05-06 LAB
POST-VOID RESIDUAL VOLUME, ML POC: 44 ML
SL AMB  POCT GLUCOSE, UA: NORMAL
SL AMB LEUKOCYTE ESTERASE,UA: NORMAL
SL AMB POCT BILIRUBIN,UA: NORMAL
SL AMB POCT BLOOD,UA: NORMAL
SL AMB POCT CLARITY,UA: NORMAL
SL AMB POCT COLOR,UA: YELLOW
SL AMB POCT KETONES,UA: NORMAL
SL AMB POCT NITRITE,UA: NORMAL
SL AMB POCT PH,UA: 6
SL AMB POCT SPECIFIC GRAVITY,UA: 1.01
SL AMB POCT URINE PROTEIN: NORMAL
SL AMB POCT UROBILINOGEN: 0.2

## 2022-05-06 PROCEDURE — 99214 OFFICE O/P EST MOD 30 MIN: CPT | Performed by: UROLOGY

## 2022-05-06 PROCEDURE — 81002 URINALYSIS NONAUTO W/O SCOPE: CPT | Performed by: UROLOGY

## 2022-05-06 PROCEDURE — 51798 US URINE CAPACITY MEASURE: CPT | Performed by: UROLOGY

## 2022-05-06 NOTE — PROGRESS NOTES
[address] [facility]  [facility]  [address] [facility] [address]  [place], [address] [facility]  [phone]  www  [facility]  org      NAME: Gil Chaudhry  AGE: 52 y o  SEX: male  : 1975   MRN: [id number]    DATE: 2022  TIME: 10:24 AM    Assessment and Plan:    BPH with obstruction dependent Flomax, with increasing frequency despite the Flomax  Some of this may be related to his caffeine consumption in fluids  Advised to cut back on the tea if possible  Can try Prelief if he would like  For the micro hematuria and BPH we will set him up for cystoscopy in kidney bladder ultrasound with another PVR  We will also check urinalysis with microscopy in the lab  For prostate cancer screening, recheck PSA  He is only 47 but his last 1 was above 1 0 in   Follow-up for cystoscopy  Chief Complaint     Chief Complaint   Patient presents with    Urinary Frequency       History of Present Illness    established patient, last seen by Lainey Junior 2019, new to me  Has history of urinary frequency and was prescribed tamsulosin in 2019  When he last saw Lainey Junior, he underwent a vasectomy consultation  Had COVID last month  he is dependent upon flomax- if he skips a dose, he has difficulty urinating  Recently he has been having more frequency- feels that he empties well, but often has to go q1hr  Drinking a fair amount of fluids  Lots of caffeine- tea and coffee, and gatorade  Minimal ETOH  Frequency started in the past year  PVR today 44, U/A shows 2plus blood  No previous cystoscopy  He does have diverticulitis  Prostate cancer screening:  Last PSA was 1 3 2020      The following portions of the patient's history were reviewed and updated as appropriate: allergies, current medications, past family history, past medical history, past social history, past surgical history and problem list   Past Medical History:   Diagnosis Date    Diverticulitis of colon  GERD (gastroesophageal reflux disease)     Obesity      History reviewed  No pertinent surgical history  shoulder  Review of Systems   Review of Systems   Constitutional: Negative for fever  Respiratory: Negative for shortness of breath  Cardiovascular: Negative for chest pain  Gastrointestinal: Positive for constipation  Negative for diarrhea  Genitourinary: Positive for difficulty urinating, frequency and urgency  Active Problem List     Patient Active Problem List   Diagnosis    Asthma    Diverticulitis of colon    Gastroesophageal reflux disease without esophagitis    Obesity    Obstructive sleep apnea    Peripheral edema    Acute gout involving toe of right foot    Other hyperlipidemia    Acute idiopathic gout involving toe of right foot    Urinary frequency    Hematuria    Left otitis media with effusion    Conductive hearing loss of left ear    Annual physical exam       Objective   /70   Pulse 64   Ht 5' 11" (1 803 m)   Wt 125 kg (276 lb)   BMI 38 49 kg/m²     Physical Exam  Vitals reviewed  Constitutional:       Appearance: Normal appearance  HENT:      Head: Normocephalic and atraumatic  Eyes:      Extraocular Movements: Extraocular movements intact  Pulmonary:      Effort: Pulmonary effort is normal    Musculoskeletal:         General: Normal range of motion  Cervical back: Normal range of motion  Skin:     Coloration: Skin is not jaundiced or pale  Neurological:      General: No focal deficit present  Mental Status: He is alert and oriented to person, place, and time  Psychiatric:         Mood and Affect: Mood normal          Behavior: Behavior normal          Thought Content:  Thought content normal          Judgment: Judgment normal              Current Medications     Current Outpatient Medications:     albuterol (Proventil HFA) 90 mcg/act inhaler, Inhale 2 puffs every 6 (six) hours as needed for wheezing, Disp: 6 7 g, Rfl: 5   azelastine (ASTELIN) 0 1 % nasal spray, 1 spray into each nostril 2 (two) times a day Use in each nostril as directed, Disp: 30 mL, Rfl: 1    fluticasone (FLONASE) 50 mcg/act nasal spray, 1 spray into each nostril daily, Disp: 16 g, Rfl: 0    indomethacin (INDOCIN) 50 mg capsule, , Disp: , Rfl:     Loratadine-Pseudoephedrine (CLARITIN-D 12 HOUR PO), Take by mouth, Disp: , Rfl:     omeprazole (PriLOSEC) 20 mg delayed release capsule, Take 1 capsule (20 mg total) by mouth daily, Disp: 30 capsule, Rfl: 5    tamsulosin (FLOMAX) 0 4 mg, Take 1 capsule (0 4 mg total) by mouth daily with dinner , Disp: 30 capsule, Rfl: 0    valACYclovir (VALTREX) 1,000 mg tablet, Take 1 tablet (1,000 mg total) by mouth 2 (two) times a day for 7 days, Disp: 14 tablet, Rfl: 3        Adrianne Caal MD

## 2022-05-12 ENCOUNTER — APPOINTMENT (OUTPATIENT)
Dept: LAB | Facility: MEDICAL CENTER | Age: 47
End: 2022-05-12
Payer: COMMERCIAL

## 2022-05-12 DIAGNOSIS — M10.9 ACUTE GOUT, UNSPECIFIED CAUSE, UNSPECIFIED SITE: ICD-10-CM

## 2022-05-12 DIAGNOSIS — Z12.5 PROSTATE CANCER SCREENING: ICD-10-CM

## 2022-05-12 DIAGNOSIS — N40.1 BPH WITH OBSTRUCTION/LOWER URINARY TRACT SYMPTOMS: ICD-10-CM

## 2022-05-12 DIAGNOSIS — N13.8 BPH WITH OBSTRUCTION/LOWER URINARY TRACT SYMPTOMS: ICD-10-CM

## 2022-05-12 DIAGNOSIS — R31.29 MICROSCOPIC HEMATURIA: ICD-10-CM

## 2022-05-12 LAB
BACTERIA UR QL AUTO: ABNORMAL /HPF
BILIRUB UR QL STRIP: NEGATIVE
CLARITY UR: CLEAR
COLOR UR: YELLOW
GLUCOSE UR STRIP-MCNC: NEGATIVE MG/DL
HGB UR QL STRIP.AUTO: ABNORMAL
KETONES UR STRIP-MCNC: NEGATIVE MG/DL
LEUKOCYTE ESTERASE UR QL STRIP: ABNORMAL
MUCOUS THREADS UR QL AUTO: ABNORMAL
NITRITE UR QL STRIP: NEGATIVE
NON-SQ EPI CELLS URNS QL MICRO: ABNORMAL /HPF
PH UR STRIP.AUTO: 6.5 [PH]
PROT UR STRIP-MCNC: ABNORMAL MG/DL
PSA SERPL-MCNC: 1.6 NG/ML (ref 0–4)
RBC #/AREA URNS AUTO: ABNORMAL /HPF
SP GR UR STRIP.AUTO: 1.02 (ref 1–1.03)
URATE SERPL-MCNC: 7.6 MG/DL (ref 4.2–8)
UROBILINOGEN UR STRIP-ACNC: <2 MG/DL
WBC #/AREA URNS AUTO: ABNORMAL /HPF

## 2022-05-12 PROCEDURE — 36415 COLL VENOUS BLD VENIPUNCTURE: CPT

## 2022-05-12 PROCEDURE — 81001 URINALYSIS AUTO W/SCOPE: CPT

## 2022-05-12 PROCEDURE — G0103 PSA SCREENING: HCPCS

## 2022-05-12 PROCEDURE — 84550 ASSAY OF BLOOD/URIC ACID: CPT

## 2022-05-19 ENCOUNTER — TELEPHONE (OUTPATIENT)
Dept: UROLOGY | Facility: CLINIC | Age: 47
End: 2022-05-19

## 2022-05-19 NOTE — TELEPHONE ENCOUNTER
Contacted patient and scheduled him for cystoscopy with Dr Pedro Zhang in [place] on 5/24/22 @ 2:00  He is aware to arrive 15 minutes early with a comfortably full bladder to provide urine sample  Reviewed procedure with him  Patient verbalized understanding

## 2022-05-24 ENCOUNTER — PROCEDURE VISIT (OUTPATIENT)
Dept: UROLOGY | Facility: CLINIC | Age: 47
End: 2022-05-24
Payer: COMMERCIAL

## 2022-05-24 VITALS
SYSTOLIC BLOOD PRESSURE: 130 MMHG | HEIGHT: 71 IN | DIASTOLIC BLOOD PRESSURE: 80 MMHG | BODY MASS INDEX: 38.78 KG/M2 | WEIGHT: 277 LBS

## 2022-05-24 DIAGNOSIS — R35.0 URINARY FREQUENCY: Primary | ICD-10-CM

## 2022-05-24 DIAGNOSIS — R31.29 MICROSCOPIC HEMATURIA: ICD-10-CM

## 2022-05-24 DIAGNOSIS — Z12.5 PROSTATE CANCER SCREENING: ICD-10-CM

## 2022-05-24 DIAGNOSIS — N40.1 BPH WITH OBSTRUCTION/LOWER URINARY TRACT SYMPTOMS: ICD-10-CM

## 2022-05-24 DIAGNOSIS — N13.8 BPH WITH OBSTRUCTION/LOWER URINARY TRACT SYMPTOMS: ICD-10-CM

## 2022-05-24 PROCEDURE — 99214 OFFICE O/P EST MOD 30 MIN: CPT | Performed by: UROLOGY

## 2022-05-24 PROCEDURE — 1036F TOBACCO NON-USER: CPT | Performed by: UROLOGY

## 2022-05-24 PROCEDURE — 3008F BODY MASS INDEX DOCD: CPT | Performed by: UROLOGY

## 2022-05-24 PROCEDURE — 52000 CYSTOURETHROSCOPY: CPT | Performed by: UROLOGY

## 2022-05-24 RX ORDER — LEVOFLOXACIN 500 MG/1
500 TABLET, FILM COATED ORAL EVERY 24 HOURS
Qty: 1 TABLET | Refills: 0 | Status: SHIPPED | OUTPATIENT
Start: 2022-05-24 | End: 2022-05-25

## 2022-05-24 RX ORDER — TAMSULOSIN HYDROCHLORIDE 0.4 MG/1
0.4 CAPSULE ORAL 2 TIMES DAILY
Qty: 180 CAPSULE | Refills: 3 | Status: SHIPPED | OUTPATIENT
Start: 2022-05-24 | End: 2022-08-22

## 2022-05-24 NOTE — PROGRESS NOTES
[address] [facility]  [facility]  [address] [facility] [address]  [place], [address] [facility]  [phone]  www  [facility]  org      NAME: Gil Chaudhry  AGE: 52 y o  SEX: male  : 1975   MRN: [id number]    DATE: 2022  TIME: 2:33 PM    Assessment and Plan:    BPH with obstruction with trilobar hypertrophy and very large median lobe that is highly obstructive  We discussed medical therapy versus surgical therapy  We also discussed finasteride  He will think about what he would like to do in currently I have no absolute indications to performing surgery  I did recommend that he perhaps try taking the Flomax every 12 hours  Check renal ultrasound send the results  Follow-up 1 year or p r n  Sonya Penn Chief Complaint     Chief Complaint   Patient presents with    Cystoscopy       History of Present Illness   Follow-up BPH with obstruction dependent on Flomax, with increasing urinary frequency despite the Flomax  Last office visit I set him up for cystoscopy and kidney bladder ultrasound with another PVR  Urinalysis with microscopy 2022 showed moderate blood, 10-20 white blood cells  No bacteria  PSA normal at 1 6  Uric acid level normal at 7 6  PVR 44 cc last office visit  Kidney bladder ultrasound was ordered but not done  He started taking the Flomax instead of at night taking it in the morning and he seems to be doing a little better  Decent stream   Less urgency  He wants took it twice per day and he felt strange from it  Cystoscopy     Date/Time 2022 2:01 PM     Performed by  Jayne Grider MD     Authorized by Jayne Grider MD      Universal Protocol:  Consent: Verbal consent obtained  Written consent obtained        Procedure Details:  Procedure type: cystoscopy    Additional Procedure Details: Cystoscopy Procedure Note        Pre-operative Diagnosis:  BPH with obstruction, urgency frequency    Post-operative Diagnosis:  Same with large median lobe    Procedure: Flexible cystoscopy    Surgeon: Addi Medeiros MD    Anesthesia: 1% Xylocaine per urethra    EBL: Minimal    Complications: none    Procedure Details   The risks, benefits, complications, treatment options, and expected outcomes were discussed with the patient  The patient concurred with the proposed plan, giving informed consent  Cystoscopy was performed today under local anesthesia, using sterile technique  The patient was placed in the supine position, prepped with Betadine, and draped in the usual sterile fashion  The flexible cystocope was used to inspect both the urethra and bladder    Findings:  Urethra:  Normal without stricture  Prostate shows trilobar hypertrophy with very prominent median lobe that protrudes into the bladder and is highly obstructive  Bladder:  Smooth, not trabeculated and there were no stones tumors or other lesions  The orifices were orthotopic and intact  Specimens none                 Complications:  None           Disposition: To home            Condition:  Stable          The following portions of the patient's history were reviewed and updated as appropriate: allergies, current medications, past family history, past medical history, past social history, past surgical history and problem list   Past Medical History:   Diagnosis Date    Diverticulitis of colon     GERD (gastroesophageal reflux disease)     Obesity      History reviewed  No pertinent surgical history  shoulder  Review of Systems   Review of Systems   Constitutional: Negative for fever  Respiratory: Negative for shortness of breath  Cardiovascular: Negative for chest pain  Genitourinary:        As per HPI   Neurological: Negative for dizziness         Active Problem List     Patient Active Problem List   Diagnosis    Asthma    Diverticulitis of colon    Gastroesophageal reflux disease without esophagitis    Obesity    Obstructive sleep apnea    Peripheral edema    Acute gout involving toe of right foot    Other hyperlipidemia    Acute idiopathic gout involving toe of right foot    Urinary frequency    Hematuria    Left otitis media with effusion    Conductive hearing loss of left ear    Annual physical exam       Objective   /80   Ht 5' 11" (1 803 m)   Wt 126 kg (277 lb)   BMI 38 63 kg/m²     Physical Exam  Vitals reviewed  Constitutional:       Appearance: Normal appearance  HENT:      Head: Normocephalic and atraumatic  Eyes:      Extraocular Movements: Extraocular movements intact  Pulmonary:      Effort: Pulmonary effort is normal    Genitourinary:     Penis: Normal     Musculoskeletal:         General: Normal range of motion  Cervical back: Normal range of motion  Skin:     Coloration: Skin is not jaundiced or pale  Neurological:      General: No focal deficit present  Mental Status: He is alert and oriented to person, place, and time  Mental status is at baseline  Psychiatric:         Mood and Affect: Mood normal          Behavior: Behavior normal          Thought Content:  Thought content normal          Judgment: Judgment normal              Current Medications     Current Outpatient Medications:     albuterol (Proventil HFA) 90 mcg/act inhaler, Inhale 2 puffs every 6 (six) hours as needed for wheezing, Disp: 6 7 g, Rfl: 5    azelastine (ASTELIN) 0 1 % nasal spray, 1 spray into each nostril 2 (two) times a day Use in each nostril as directed, Disp: 30 mL, Rfl: 1    fluticasone (FLONASE) 50 mcg/act nasal spray, 1 spray into each nostril daily, Disp: 16 g, Rfl: 0    indomethacin (INDOCIN) 50 mg capsule, , Disp: , Rfl:     levofloxacin (LEVAQUIN) 500 mg tablet, Take 1 tablet (500 mg total) by mouth every 24 hours for 1 dose Take 1 tablet after procedure, Disp: 1 tablet, Rfl: 0    Loratadine-Pseudoephedrine (CLARITIN-D 12 HOUR PO), Take by mouth, Disp: , Rfl:     omeprazole (PriLOSEC) 20 mg delayed release capsule, Take 1 capsule (20 mg total) by mouth daily, Disp: 30 capsule, Rfl: 5    tamsulosin (FLOMAX) 0 4 mg, Take 1 capsule (0 4 mg total) by mouth daily with dinner , Disp: 30 capsule, Rfl: 0    valACYclovir (VALTREX) 1,000 mg tablet, Take 1 tablet (1,000 mg total) by mouth 2 (two) times a day for 7 days, Disp: 14 tablet, Rfl: 3        Gab Bal MD

## 2022-05-24 NOTE — PATIENT INSTRUCTIONS
Take the Flomax twice daily, morning and night  Let me know if you would like to start finasteride or if you would like to have surgery which would be the trans urethral resection of the prostate  I also will be starting Rezum for patients soon

## 2022-05-24 NOTE — LETTER
May 24, 2022     [facility],   [address] [address]    Patient: Kim Conner   YOB: 1975   Date of Visit: 2022       Dear Dr Olesya Hernandez: Thank you for referring Liza Moon to me for evaluation  Below are my notes for this consultation  If you have questions, please do not hesitate to call me  I look forward to following your patient along with you  Sincerely,        Caludia Guadalupe MD        CC: No Recipients  Claudia Guadalupe MD  2022  2:33 PM  Sign when Signing Visit  [address] [facility]  [address], [address] [place] [phone]  www  [facility]  org      NAME: Gil Chaudhry  AGE: 52 y o  SEX: male  : 1975   MRN: [id number]    DATE: 2022  TIME: 2:33 PM    Assessment and Plan:    BPH with obstruction with trilobar hypertrophy and very large median lobe that is highly obstructive  We discussed medical therapy versus surgical therapy  We also discussed finasteride  He will think about what he would like to do in currently I have no absolute indications to performing surgery  I did recommend that he perhaps try taking the Flomax every 12 hours  Check renal ultrasound send the results  Follow-up 1 year or p r kermit Morel Given Chief Complaint     Chief Complaint   Patient presents with    Cystoscopy       History of Present Illness   Follow-up BPH with obstruction dependent on Flomax, with increasing urinary frequency despite the Flomax  Last office visit I set him up for cystoscopy and kidney bladder ultrasound with another PVR  Urinalysis with microscopy 2022 showed moderate blood, 10-20 white blood cells  No bacteria  PSA normal at 1 6  Uric acid level normal at 7 6  PVR 44 cc last office visit  Kidney bladder ultrasound was ordered but not done    He started taking the Flomax instead of at night taking it in the morning and he seems to be doing a little better  Decent stream   Less urgency  He wants took it twice per day and he felt strange from it  Cystoscopy     Date/Time 5/24/2022 2:01 PM     Performed by  Rocky Daley MD     Authorized by Rocky Daley MD      Universal Protocol:  Consent: Verbal consent obtained  Written consent obtained  Procedure Details:  Procedure type: cystoscopy    Additional Procedure Details: Cystoscopy Procedure Note        Pre-operative Diagnosis:  BPH with obstruction, urgency frequency    Post-operative Diagnosis:  Same with large median lobe    Procedure: Flexible cystoscopy    Surgeon: Julia Fay MD    Anesthesia: 1% Xylocaine per urethra    EBL: Minimal    Complications: none    Procedure Details   The risks, benefits, complications, treatment options, and expected outcomes were discussed with the patient  The patient concurred with the proposed plan, giving informed consent  Cystoscopy was performed today under local anesthesia, using sterile technique  The patient was placed in the supine position, prepped with Betadine, and draped in the usual sterile fashion  The flexible cystocope was used to inspect both the urethra and bladder    Findings:  Urethra:  Normal without stricture  Prostate shows trilobar hypertrophy with very prominent median lobe that protrudes into the bladder and is highly obstructive  Bladder:  Smooth, not trabeculated and there were no stones tumors or other lesions  The orifices were orthotopic and intact             Specimens none                 Complications:  None           Disposition: To home            Condition:  Stable          The following portions of the patient's history were reviewed and updated as appropriate: allergies, current medications, past family history, past medical history, past social history, past surgical history and problem list   Past Medical History:   Diagnosis Date    Diverticulitis of colon     GERD (gastroesophageal reflux disease)  Obesity      History reviewed  No pertinent surgical history  shoulder  Review of Systems   Review of Systems   Constitutional: Negative for fever  Respiratory: Negative for shortness of breath  Cardiovascular: Negative for chest pain  Genitourinary:        As per HPI   Neurological: Negative for dizziness  Active Problem List     Patient Active Problem List   Diagnosis    Asthma    Diverticulitis of colon    Gastroesophageal reflux disease without esophagitis    Obesity    Obstructive sleep apnea    Peripheral edema    Acute gout involving toe of right foot    Other hyperlipidemia    Acute idiopathic gout involving toe of right foot    Urinary frequency    Hematuria    Left otitis media with effusion    Conductive hearing loss of left ear    Annual physical exam       Objective   /80   Ht 5' 11" (1 803 m)   Wt 126 kg (277 lb)   BMI 38 63 kg/m²     Physical Exam  Vitals reviewed  Constitutional:       Appearance: Normal appearance  HENT:      Head: Normocephalic and atraumatic  Eyes:      Extraocular Movements: Extraocular movements intact  Pulmonary:      Effort: Pulmonary effort is normal    Genitourinary:     Penis: Normal     Musculoskeletal:         General: Normal range of motion  Cervical back: Normal range of motion  Skin:     Coloration: Skin is not jaundiced or pale  Neurological:      General: No focal deficit present  Mental Status: He is alert and oriented to person, place, and time  Mental status is at baseline  Psychiatric:         Mood and Affect: Mood normal          Behavior: Behavior normal          Thought Content:  Thought content normal          Judgment: Judgment normal              Current Medications     Current Outpatient Medications:     albuterol (Proventil HFA) 90 mcg/act inhaler, Inhale 2 puffs every 6 (six) hours as needed for wheezing, Disp: 6 7 g, Rfl: 5    azelastine (ASTELIN) 0 1 % nasal spray, 1 spray into each nostril 2 (two) times a day Use in each nostril as directed, Disp: 30 mL, Rfl: 1    fluticasone (FLONASE) 50 mcg/act nasal spray, 1 spray into each nostril daily, Disp: 16 g, Rfl: 0    indomethacin (INDOCIN) 50 mg capsule, , Disp: , Rfl:     levofloxacin (LEVAQUIN) 500 mg tablet, Take 1 tablet (500 mg total) by mouth every 24 hours for 1 dose Take 1 tablet after procedure, Disp: 1 tablet, Rfl: 0    Loratadine-Pseudoephedrine (CLARITIN-D 12 HOUR PO), Take by mouth, Disp: , Rfl:     omeprazole (PriLOSEC) 20 mg delayed release capsule, Take 1 capsule (20 mg total) by mouth daily, Disp: 30 capsule, Rfl: 5    tamsulosin (FLOMAX) 0 4 mg, Take 1 capsule (0 4 mg total) by mouth daily with dinner , Disp: 30 capsule, Rfl: 0    valACYclovir (VALTREX) 1,000 mg tablet, Take 1 tablet (1,000 mg total) by mouth 2 (two) times a day for 7 days, Disp: 14 tablet, Rfl: 3        Rocky Daley MD

## 2022-06-07 ENCOUNTER — TELEPHONE (OUTPATIENT)
Dept: FAMILY MEDICINE CLINIC | Facility: CLINIC | Age: 47
End: 2022-06-07

## 2022-06-07 DIAGNOSIS — R05.9 COUGH: Primary | ICD-10-CM

## 2022-06-07 RX ORDER — BENZONATATE 100 MG/1
100 CAPSULE ORAL 3 TIMES DAILY PRN
Qty: 20 CAPSULE | Refills: 0 | Status: SHIPPED | OUTPATIENT
Start: 2022-06-07 | End: 2022-06-14

## 2022-06-07 RX ORDER — DOXYCYCLINE HYCLATE 100 MG/1
100 CAPSULE ORAL EVERY 12 HOURS SCHEDULED
Qty: 14 CAPSULE | Refills: 0 | Status: SHIPPED | OUTPATIENT
Start: 2022-06-07 | End: 2022-06-07 | Stop reason: SINTOL

## 2022-06-07 RX ORDER — LEVOFLOXACIN 500 MG/1
500 TABLET, FILM COATED ORAL EVERY 24 HOURS
Qty: 5 TABLET | Refills: 0 | Status: SHIPPED | OUTPATIENT
Start: 2022-06-07 | End: 2022-06-12

## 2022-06-07 NOTE — TELEPHONE ENCOUNTER
Pt is asking for a Rx change for the antibiotic - Pt said he had a reaction to Doxycycline (broke out in a facial rash) - Asking for either Cipro or Levaquin

## 2022-06-07 NOTE — TELEPHONE ENCOUNTER
Pt c/o a cough with mucus and sore throat since Friday, 6/3/22  Tried OTC allergy rx and mucinex, not really helping  Negative home test, asking if you could call anything in? [facility]

## 2022-07-18 ENCOUNTER — CONSULT (OUTPATIENT)
Dept: BARIATRICS | Facility: CLINIC | Age: 47
End: 2022-07-18
Payer: COMMERCIAL

## 2022-07-18 VITALS
OXYGEN SATURATION: 98 % | WEIGHT: 276 LBS | SYSTOLIC BLOOD PRESSURE: 100 MMHG | DIASTOLIC BLOOD PRESSURE: 60 MMHG | BODY MASS INDEX: 38.64 KG/M2 | HEART RATE: 65 BPM | TEMPERATURE: 97.5 F | HEIGHT: 71 IN

## 2022-07-18 DIAGNOSIS — G47.33 OBSTRUCTIVE SLEEP APNEA: ICD-10-CM

## 2022-07-18 DIAGNOSIS — R63.5 ABNORMAL WEIGHT GAIN: Primary | ICD-10-CM

## 2022-07-18 DIAGNOSIS — R73.9 HYPERGLYCEMIA: ICD-10-CM

## 2022-07-18 PROCEDURE — 99204 OFFICE O/P NEW MOD 45 MIN: CPT | Performed by: PHYSICIAN ASSISTANT

## 2022-07-18 NOTE — ASSESSMENT & PLAN NOTE
-Discussed options of [facility]-Intensive Lifestyle Intervention Program, Very Low Calorie Diet-VLCD, Conservative Program, Darlene-En-Y Gastric Bypass and Vertical Sleeve Gastrectomy and the role of weight loss medications   -Initial weight loss goal of 5-10% weight loss for improved health  -Not currently interested in bariatric surgery  -Screening labs: CMP and LP reviewed from 8/6/21, uric acid reviewed from 5/12/22  -Patient is interested in pursuing Conservative Program   -Calorie goals, sample menu, portion size guidelines, and food logging reviewed with the patient    -First goal: reduce sugary beverages; try not skipping meals

## 2022-07-18 NOTE — PROGRESS NOTES
Assessment/Plan:    Severe obesity (BMI 35 0-39  9) with comorbidity ([address] )  -Discussed options of [facility]-Intensive Lifestyle Intervention Program, Very Low Calorie Diet-VLCD, Conservative Program, Darlene-En-Y Gastric Bypass and Vertical Sleeve Gastrectomy and the role of weight loss medications   -Initial weight loss goal of 5-10% weight loss for improved health  -Not currently interested in bariatric surgery  -Screening labs: CMP and LP reviewed from 8/6/21, uric acid reviewed from 5/12/22  -Patient is interested in pursuing Conservative Program   -Calorie goals, sample menu, portion size guidelines, and food logging reviewed with the patient    -First goal: reduce sugary beverages; try not skipping meals    Obstructive sleep apnea  -encouraged continued use of CPAP machine      Follow up in approximately 1 month with Non-Surgical Physician/Advanced Practitioner  Goals:  Food log (ie ) www myfitnesspal com,sparkpeople  com,loseit com,calorieking  com,etc  baritastic  No sugary beverages  At least 64oz of water daily  Increase physical activity by 10 minutes daily   Gradually increase physical activity to a goal of 5 days per week for 30 minutes of MODERATE intensity PLUS 2 days per week of FULL BODY resistance training  5-10 servings of fruits and vegetables per day and 25-35 grams of dietary fiber per day, gradually increasing  Check coverage of Wegovy, Saxenda, Qsymia, and Contrave  Measure all portions: creamers, sugars, cooking oils/butters, condiments, dressings, etc and log calories  Recommend checking lab coverage before having labs drawn   If choosing starch pick whole grain/complex carbs and keep to 1/2-3/4 cup: oatmeal, brown rice, quinoa, whole wheat pasta, potatoes, sweet potato, chickpea noodles, red lentil noodles  7485-9354 calories per day    Diagnoses and all orders for this visit:    Abnormal weight gain  -     Ambulatory Referral to Weight Management  -     Cancel: TSH, 3rd generation with Free T4 reflex; Future  -     Insulin, fasting; Future  -     Hemoglobin A1C; Future  -     Comprehensive metabolic panel; Future  -     Lipid panel; Future  -     TSH, 3rd generation with Free T4 reflex; Future    Obstructive sleep apnea  -     Cancel: TSH, 3rd generation with Free T4 reflex; Future  -     Insulin, fasting; Future  -     Hemoglobin A1C; Future  -     Comprehensive metabolic panel; Future  -     Lipid panel; Future  -     TSH, 3rd generation with Free T4 reflex; Future    Hyperglycemia  -     Cancel: TSH, 3rd generation with Free T4 reflex; Future  -     Insulin, fasting; Future  -     Hemoglobin A1C; Future  -     Comprehensive metabolic panel; Future  -     Lipid panel; Future  -     TSH, 3rd generation with Free T4 reflex; Future    Body mass index 38 0-38 9, adult        Subjective:   Chief Complaint   Patient presents with    Consult     Patient ID: Mario Waldrop  is a 52 y o  male with excess weight/obesity here to pursue weight management  Past Medical History:   Diagnosis Date    Diverticulitis of colon     GERD (gastroesophageal reflux disease)     Obesity      HPI:  Obesity/Excess Weight:  Severity: Severe  Onset:  years    Modifiers: Over the Counter Weight Loss Supplements and "fad diets" exercise  Contributing factors:  Insufficient time to make appropriate lifestyle changes, other people cooking, always feels hungry, particularly at night, poor food choices  Associated symptoms: fatigue and decreased exercise capacity, knee pain     Goals: prevent comorbid conditions, lose 20-30 lbs  Hydration: not reaching water goals; has cut back on tea, but drinking 1-2 glasses per day, drinking OJ in the am  Drinks 2-3 coffee with creamer and sugar  Alcohol: at least 2-3 times per week- labatt (2-6 servings)  Exercise: no formal exercise- but does walk dogs nightly  Occupation: FT FF and paramedic; appliance store owner  [facility]: +YAMIL, using CPAP    The following portions of the patient's history were reviewed and updated as appropriate: allergies, current medications, past family history, past medical history, past social history, past surgical history and problem list     Review of Systems   Constitutional: Negative for chills and fever  HENT: Negative for sore throat  Respiratory: Negative for cough and shortness of breath  Cardiovascular: Negative for chest pain and palpitations  Gastrointestinal: Negative for constipation and diarrhea  Genitourinary: Negative for dysuria  Musculoskeletal: Positive for arthralgias  Skin: Negative for rash  Neurological: Negative for headaches  Psychiatric/Behavioral: Negative  Objective:    /60 (BP Location: Right arm, Cuff Size: Large)   Pulse 65   Temp 97 5 °F (36 4 °C) (Temporal)   Ht 5' 11" (1 803 m)   Wt 125 kg (276 lb)   SpO2 98%   BMI 38 49 kg/m²      Physical Exam  Vitals and nursing note reviewed  Constitutional   General appearance: Abnormal   well developed and obese  Eyes No conjunctival pallor  Ears, Nose, Mouth, and Throat Oral mucosa moist    Pulmonary   Respiratory effort: No increased work of breathing or signs of respiratory distress  Auscultation of lungs: Clear to auscultation, equal breath sounds bilaterally, no wheezes, no rales, no rhonci  Cardiovascular   Auscultation of heart: Normal rate and rhythm, normal S1 and S2, without murmurs  Examination of extremities for edema and/or varicosities: trace LE edema  Abdomen   Abdomen: Abnormal   The abdomen was obese  Bowel sounds were normal  The abdomen was soft and nontender     Musculoskeletal   Gait and station: Normal     Psychiatric   Orientation to person, place and time: Normal     Affect: appropriate

## 2022-07-18 NOTE — PATIENT INSTRUCTIONS
Goals: Food log (ie ) www myfitnesspal com,sparkpeople  com,loseit com,calorieking  com,etc  baritastic  No sugary beverages  At least 64oz of water daily  Increase physical activity by 10 minutes daily   Gradually increase physical activity to a goal of 5 days per week for 30 minutes of MODERATE intensity PLUS 2 days per week of FULL BODY resistance training  5-10 servings of fruits and vegetables per day and 25-35 grams of dietary fiber per day, gradually increasing  Check coverage of Wegovy, Saxenda, Qsymia, and Contrave  Measure all portions: creamers, sugars, cooking oils/butters, condiments, dressings, etc and log calories  Recommend checking lab coverage before having labs drawn   If choosing starch pick whole grain/complex carbs and keep to 1/2-3/4 cup: oatmeal, brown rice, quinoa, whole wheat pasta, potatoes, sweet potato, chickpea noodles, red lentil noodles  2968-3792 calories per day

## 2022-08-05 ENCOUNTER — TELEPHONE (OUTPATIENT)
Dept: OTHER | Facility: OTHER | Age: 47
End: 2022-08-05

## 2022-08-08 ENCOUNTER — TELEPHONE (OUTPATIENT)
Dept: FAMILY MEDICINE CLINIC | Facility: CLINIC | Age: 47
End: 2022-08-08

## 2022-12-04 ENCOUNTER — HOSPITAL ENCOUNTER (EMERGENCY)
Facility: HOSPITAL | Age: 47
Discharge: HOME | End: 2022-12-04
Attending: EMERGENCY MEDICINE | Admitting: EMERGENCY MEDICINE

## 2022-12-04 ENCOUNTER — APPOINTMENT (EMERGENCY)
Dept: CT IMAGING | Facility: HOSPITAL | Age: 47
End: 2022-12-04

## 2022-12-04 VITALS
HEART RATE: 64 BPM | BODY MASS INDEX: 39.51 KG/M2 | WEIGHT: 283.29 LBS | RESPIRATION RATE: 16 BRPM | OXYGEN SATURATION: 96 % | SYSTOLIC BLOOD PRESSURE: 129 MMHG | DIASTOLIC BLOOD PRESSURE: 74 MMHG | TEMPERATURE: 98.5 F

## 2022-12-04 DIAGNOSIS — R07.9 CHEST PAIN: Primary | ICD-10-CM

## 2022-12-04 LAB
ALBUMIN SERPL BCP-MCNC: 3.8 G/DL (ref 3.5–5)
ALP SERPL-CCNC: 57 U/L (ref 46–116)
ALT SERPL W P-5'-P-CCNC: 28 U/L (ref 12–78)
ANION GAP SERPL CALCULATED.3IONS-SCNC: 9 MMOL/L (ref 4–13)
APTT PPP: 27 SECONDS (ref 23–37)
AST SERPL W P-5'-P-CCNC: 21 U/L (ref 5–45)
BASOPHILS # BLD AUTO: 0.04 THOUSANDS/ÂΜL (ref 0–0.1)
BASOPHILS NFR BLD AUTO: 0 % (ref 0–1)
BILIRUB SERPL-MCNC: 0.3 MG/DL (ref 0.2–1)
BUN SERPL-MCNC: 22 MG/DL (ref 5–25)
CALCIUM SERPL-MCNC: 8.7 MG/DL (ref 8.3–10.1)
CARDIAC TROPONIN I PNL SERPL HS: 7 NG/L (ref 8–18)
CARDIAC TROPONIN I PNL SERPL HS: 8 NG/L (ref 8–18)
CHLORIDE SERPL-SCNC: 104 MMOL/L (ref 96–108)
CO2 SERPL-SCNC: 27 MMOL/L (ref 21–32)
CREAT SERPL-MCNC: 0.83 MG/DL (ref 0.6–1.3)
D DIMER PPP FEU-MCNC: 0.48 UG/ML FEU
EOSINOPHIL # BLD AUTO: 0.3 THOUSAND/ÂΜL (ref 0–0.61)
EOSINOPHIL NFR BLD AUTO: 3 % (ref 0–6)
ERYTHROCYTE [DISTWIDTH] IN BLOOD BY AUTOMATED COUNT: 12.8 % (ref 11.6–15.1)
GFR SERPL CREATININE-BSD FRML MDRD: 104 ML/MIN/1.73SQ M
GLUCOSE SERPL-MCNC: 94 MG/DL (ref 65–140)
HCT VFR BLD AUTO: 44.6 % (ref 36.5–49.3)
HGB BLD-MCNC: 15 G/DL (ref 12–17)
IMM GRANULOCYTES # BLD AUTO: 0.03 THOUSAND/UL (ref 0–0.2)
IMM GRANULOCYTES NFR BLD AUTO: 0 % (ref 0–2)
INR PPP: 0.97 (ref 0.84–1.19)
LIPASE SERPL-CCNC: 159 U/L (ref 73–393)
LYMPHOCYTES # BLD AUTO: 2.89 THOUSANDS/ÂΜL (ref 0.6–4.47)
LYMPHOCYTES NFR BLD AUTO: 28 % (ref 14–44)
MAGNESIUM SERPL-MCNC: 2.1 MG/DL (ref 1.6–2.6)
MCH RBC QN AUTO: 28.6 PG (ref 26.8–34.3)
MCHC RBC AUTO-ENTMCNC: 33.6 G/DL (ref 31.4–37.4)
MCV RBC AUTO: 85 FL (ref 82–98)
MONOCYTES # BLD AUTO: 0.85 THOUSAND/ÂΜL (ref 0.17–1.22)
MONOCYTES NFR BLD AUTO: 8 % (ref 4–12)
NEUTROPHILS # BLD AUTO: 6.29 THOUSANDS/ÂΜL (ref 1.85–7.62)
NEUTS SEG NFR BLD AUTO: 61 % (ref 43–75)
NRBC BLD AUTO-RTO: 0 /100 WBCS
PLATELET # BLD AUTO: 204 THOUSANDS/UL (ref 149–390)
PMV BLD AUTO: 11.1 FL (ref 8.9–12.7)
POTASSIUM SERPL-SCNC: 4 MMOL/L (ref 3.5–5.3)
PROT SERPL-MCNC: 7.5 G/DL (ref 6.4–8.4)
PROTHROMBIN TIME: 13 SECONDS (ref 11.6–14.5)
RBC # BLD AUTO: 5.25 MILLION/UL (ref 3.88–5.62)
SODIUM SERPL-SCNC: 140 MMOL/L (ref 135–147)
WBC # BLD AUTO: 10.4 THOUSAND/UL (ref 4.31–10.16)

## 2022-12-04 RX ORDER — METHOCARBAMOL 750 MG/1
750 TABLET, FILM COATED ORAL EVERY 6 HOURS PRN
Qty: 20 TABLET | Refills: 0 | Status: SHIPPED | OUTPATIENT
Start: 2022-12-04

## 2022-12-04 RX ORDER — KETOROLAC TROMETHAMINE 30 MG/ML
15 INJECTION, SOLUTION INTRAMUSCULAR; INTRAVENOUS ONCE
Status: COMPLETED | OUTPATIENT
Start: 2022-12-04 | End: 2022-12-04

## 2022-12-04 RX ADMIN — IOHEXOL 100 ML: 350 INJECTION, SOLUTION INTRAVENOUS at 19:20

## 2022-12-04 RX ADMIN — KETOROLAC TROMETHAMINE 15 MG: 30 INJECTION, SOLUTION INTRAMUSCULAR; INTRAVENOUS at 21:05

## 2022-12-04 NOTE — Clinical Note
Hernandez Landon was seen and treated in our emergency department on 12/4/2022  Diagnosis:     Maddie  may return to work on return date  He may return on this date: 12/08/2022         If you have any questions or concerns, please don't hesitate to call        Lisy Moon MD    ______________________________           _______________          _______________  Hospital Representative                              Date                                Time

## 2022-12-04 NOTE — ED PROVIDER NOTES
History  Chief Complaint   Patient presents with   • Chest Pain     Patient c/o chest tightness that radiates to back starting three days ago  Patient denies SOB, N/V      45-year-old male with history of asthma reflex diverticulitis YAMIL edema hyperlipidemia and gout presents with 3-5 day history of intermittent anterior to shoulder blade pain  He has a remote history of compression fractures in his thoracic spine when he was a teenager and occasionally his thoracic spine acts up  Typically Motrin helps  Patient describes a pressure which is been radiating from his back anteriorly as well as to the bilateral anterior region of his chest which has been more constant been fatigued Motrin was not helping today  There are occasional times where his discomfort is worse with movement when he rolls or turns there is no history of trauma or falls  No SOB no pleuritic pain  He has no radiation of pain to his low back  He does describe a sharp right upper quadrant pain that lasted 1-2 days last week but he currently denies any abdominal pain today  he has had no nausea vomiting diarrhea  He denies any diaphoresis leg swelling no prior history of DVT or PE no upper respiratory complaints no lightheadedness there is a family history of coronary artery disease and stroke  When I am discussing symptoms with him he states he feels pretty good right now denies any anterior chest pressure back pain or right upper quadrant pain          Prior to Admission Medications   Prescriptions Last Dose Informant Patient Reported? Taking?    Loratadine-Pseudoephedrine (CLARITIN-D 12 HOUR PO)   Yes No   Sig: Take by mouth   albuterol (Proventil HFA) 90 mcg/act inhaler   No No   Sig: Inhale 2 puffs every 6 (six) hours as needed for wheezing   azelastine (ASTELIN) 0 1 % nasal spray   No No   Si spray into each nostril 2 (two) times a day Use in each nostril as directed   fluticasone (FLONASE) 50 mcg/act nasal spray   No No   Si spray into each nostril daily   indomethacin (INDOCIN) 50 mg capsule   Yes No   omeprazole (PriLOSEC) 20 mg delayed release capsule   No No   Sig: Take 1 capsule (20 mg total) by mouth daily   tamsulosin (FLOMAX) 0 4 mg   No No   Sig: Take 1 capsule (0 4 mg total) by mouth in the morning and 1 capsule (0 4 mg total) before bedtime  Do all this for 180 doses  valACYclovir (VALTREX) 1,000 mg tablet   No No   Sig: Take 1 tablet (1,000 mg total) by mouth 2 (two) times a day for 7 days      Facility-Administered Medications: None       Past Medical History:   Diagnosis Date   • Diverticulitis of colon    • GERD (gastroesophageal reflux disease)    • Obesity        No past surgical history on file  Family History   Problem Relation Age of Onset   • Diabetes Mother    • Cerebral aneurysm Mother    • Stroke Father         syndrome   • Stroke Maternal Grandmother         syndrome   • Heart disease Paternal Grandfather    • Cancer Family    • Esophageal cancer Family      I have reviewed and agree with the history as documented  E-Cigarette/Vaping   • E-Cigarette Use Never User      E-Cigarette/Vaping Substances   • Nicotine No    • THC No    • CBD No    • Flavoring No    • Other No    • Unknown No      Social History     Tobacco Use   • Smoking status: Some Days     Packs/day: 0 25     Types: Cigarettes   • Smokeless tobacco: Never   • Tobacco comments:     (unknown if ever smoked-as per [employer])   Vaping Use   • Vaping Use: Never used   Substance Use Topics   • Alcohol use: Yes     Comment: socially   • Drug use: No       Review of Systems   Constitutional: Positive for activity change and fatigue  Negative for appetite change, chills and fever  HENT: Negative for congestion, ear pain, rhinorrhea, sneezing and sore throat  Eyes: Negative for discharge  Respiratory: Positive for chest tightness  Negative for cough and shortness of breath  Cardiovascular: Positive for chest pain   Negative for palpitations and leg swelling  Gastrointestinal: Positive for abdominal pain (RUQ last week)  Negative for blood in stool, diarrhea, nausea and vomiting  Endocrine: Negative for polyuria  Genitourinary: Negative for difficulty urinating, dysuria, frequency and urgency  Musculoskeletal: Positive for back pain (upper back)  Negative for myalgias, neck pain and neck stiffness  Skin: Negative for rash  Neurological: Negative for dizziness, speech difficulty, weakness, light-headedness, numbness and headaches  Hematological: Negative for adenopathy  Psychiatric/Behavioral: Negative for confusion  All other systems reviewed and are negative  Physical Exam  Physical Exam  Vitals and nursing note reviewed  Constitutional:       General: He is not in acute distress  Appearance: He is well-developed  He is not diaphoretic  HENT:      Head: Normocephalic and atraumatic  Right Ear: Tympanic membrane and external ear normal       Left Ear: Tympanic membrane and external ear normal       Nose: Nose normal       Mouth/Throat:      Mouth: Oropharynx is clear and moist  Mucous membranes are moist       Pharynx: No oropharyngeal exudate or posterior oropharyngeal erythema  Eyes:      General: No scleral icterus  Right eye: No discharge  Left eye: No discharge  Extraocular Movements: Extraocular movements intact and EOM normal       Conjunctiva/sclera: Conjunctivae normal       Pupils: Pupils are equal, round, and reactive to light  Cardiovascular:      Rate and Rhythm: Normal rate and regular rhythm  Pulses: Intact distal pulses  Heart sounds: Normal heart sounds  Pulmonary:      Effort: Pulmonary effort is normal  No respiratory distress  Breath sounds: Normal breath sounds  No stridor  No wheezing, rhonchi or rales  Chest:      Chest wall: No tenderness  Abdominal:      General: Bowel sounds are normal  There is no distension  Palpations: Abdomen is soft  There is no mass  Tenderness: There is no abdominal tenderness  There is no right CVA tenderness, left CVA tenderness, guarding or rebound  Comments: No Petty's sign Back: no mildline R or L spine tenderenss   Musculoskeletal:         General: No tenderness, deformity or edema  Normal range of motion  Cervical back: Normal range of motion and neck supple  Right lower leg: No edema  Left lower leg: No edema  Lymphadenopathy:      Cervical: No cervical adenopathy  Skin:     General: Skin is warm and dry  Neurological:      Mental Status: He is alert and oriented to person, place, and time  Cranial Nerves: No cranial nerve deficit  Motor: No weakness or abnormal muscle tone        Deep Tendon Reflexes: Reflexes normal       Comments: Gait steady   Psychiatric:         Mood and Affect: Mood and affect and mood normal          Vital Signs  ED Triage Vitals [12/04/22 1756]   Temperature Pulse Respirations Blood Pressure SpO2   98 5 °F (36 9 °C) 65 18 167/88 99 %      Temp Source Heart Rate Source Patient Position - Orthostatic VS BP Location FiO2 (%)   Tympanic Monitor Lying -- --      Pain Score       3           Vitals:    12/04/22 1756 12/04/22 1930   BP: 167/88 129/74   Pulse: 65 64   Patient Position - Orthostatic VS: Lying          Visual Acuity      ED Medications  Medications   iohexol (OMNIPAQUE) 350 MG/ML injection (SINGLE-DOSE) 100 mL (100 mL Intravenous Given 12/4/22 1920)   ketorolac (TORADOL) injection 15 mg (15 mg Intravenous Given 12/4/22 2105)       Diagnostic Studies  Results Reviewed     Procedure Component Value Units Date/Time    High Sensitivity Troponin I Random [[id number]]  (Normal) Collected: 12/04/22 2112    Lab Status: Final result Specimen: Blood from Arm, Left Updated: 12/04/22 2202     HS TnI random 8 ng/L     High Sensitivity Troponin I Random [[id number]]  (Abnormal) Collected: 12/04/22 1830    Lab Status: Final result Specimen: Blood from Arm, Right Updated: 12/04/22 1912     HS TnI random 7 ng/L     Lipase [[id number]]  (Normal) Collected: 12/04/22 1830    Lab Status: Final result Specimen: Blood from Arm, Right Updated: 12/04/22 1905     Lipase 159 u/L     Comprehensive metabolic panel [[id number]] Collected: 12/04/22 1830    Lab Status: Final result Specimen: Blood from Arm, Right Updated: 12/04/22 1905     Sodium 140 mmol/L      Potassium 4 0 mmol/L      Chloride 104 mmol/L      CO2 27 mmol/L      ANION GAP 9 mmol/L      BUN 22 mg/dL      Creatinine 0 83 mg/dL      Glucose 94 mg/dL      Calcium 8 7 mg/dL      AST 21 U/L      ALT 28 U/L      Alkaline Phosphatase 57 U/L      Total Protein 7 5 g/dL      Albumin 3 8 g/dL      Total Bilirubin 0 30 mg/dL      eGFR 104 ml/min/1 73sq m     Narrative:      [place] guidelines for Chronic Kidney Disease (CKD):   •  Stage 1 with normal or high GFR (GFR > 90 mL/min/1 73 square meters)  •  Stage 2 Mild CKD (GFR = 60-89 mL/min/1 73 square meters)  •  Stage 3A Moderate CKD (GFR = 45-59 mL/min/1 73 square meters)  •  Stage 3B Moderate CKD (GFR = 30-44 mL/min/1 73 square meters)  •  Stage 4 Severe CKD (GFR = 15-29 mL/min/1 73 square meters)  •  Stage 5 End Stage CKD (GFR <15 mL/min/1 73 square meters)  Note: GFR calculation is accurate only with a steady state creatinine    Magnesium [[id number]]  (Normal) Collected: 12/04/22 1830    Lab Status: Final result Specimen: Blood from Arm, Right Updated: 12/04/22 1905     Magnesium 2 1 mg/dL     D-Dimer [[id number]]  (Normal) Collected: 12/04/22 1830    Lab Status: Final result Specimen: Blood from Arm, Right Updated: 12/04/22 1902     D-Dimer, Quant 0 48 ug/ml FEU     Protime-INR [[id number]]  (Normal) Collected: 12/04/22 1830    Lab Status: Final result Specimen: Blood from Arm, Right Updated: 12/04/22 1900     Protime 13 0 seconds      INR 0 97    APTT [[id number]]  (Normal) Collected: 12/04/22 1830    Lab Status: Final result Specimen: Blood from Arm, Right Updated: 12/04/22 1900     PTT 27 seconds     CBC and differential [[id number]]  (Abnormal) Collected: 12/04/22 1830    Lab Status: Final result Specimen: Blood from Arm, Right Updated: 12/04/22 1843     WBC 10 40 Thousand/uL      RBC 5 25 Million/uL      Hemoglobin 15 0 g/dL      Hematocrit 44 6 %      MCV 85 fL      MCH 28 6 pg      MCHC 33 6 g/dL      RDW 12 8 %      MPV 11 1 fL      Platelets 262 Thousands/uL      nRBC 0 /100 WBCs      Neutrophils Relative 61 %      Immat GRANS % 0 %      Lymphocytes Relative 28 %      Monocytes Relative 8 %      Eosinophils Relative 3 %      Basophils Relative 0 %      Neutrophils Absolute 6 29 Thousands/µL      Immature Grans Absolute 0 03 Thousand/uL      Lymphocytes Absolute 2 89 Thousands/µL      Monocytes Absolute 0 85 Thousand/µL      Eosinophils Absolute 0 30 Thousand/µL      Basophils Absolute 0 04 Thousands/µL                  CTA dissection protocol chest abdomen pelvis w wo contrast   Final Result by Hailee Castelan MD (12/04 1954)      1  No aortic dissection, intramural hematoma, or aneurysm  2   Multiple incidental findings, as described above        Workstation performed: [id number]                    Procedures  ECG 12 Lead Documentation Only    Date/Time: 12/4/2022 6:00 PM  Performed by: Shana Garza MD  Authorized by: Shana Garza MD     Indications / Diagnosis:  Chest tightness  ECG reviewed by me, the ED Provider: yes    Patient location:  ED  Previous ECG:     Previous ECG:  Unavailable (no previous available)  Rate:     ECG rate:  67    ECG rate assessment: normal    Rhythm:     Rhythm: sinus rhythm    QRS:     QRS axis:  Normal  Comments:      twi III; no acute ischemic changes    ECG 12 Lead Documentation Only    Date/Time: 12/4/2022 9:10 PM  Performed by: Shana Garza MD  Authorized by: Shana Garza MD     Indications / Diagnosis:  Delta EKG  ECG reviewed by me, the ED Provider: yes Patient location:  ED  Previous ECG:     Previous ECG:  Compared to current    Comparison ECG info:  12/4/22 1758    Similarity:  No change  Rate:     ECG rate:  67    ECG rate assessment: normal    Rhythm:     Rhythm: sinus rhythm    QRS:     QRS axis:  Normal  Comments:      T-wave inversion in lead III unchanged; TWI V3; no acute ischemic changes             ED Course  ED Course as of 12/04/22 2228   Sun Dec 04, 2022   1933 Reviewed labs chest discomfort intermittently comes and goes very mild will try Toradol last ibuprofen dosing was approximately 7 hours ago   2101 Reviewed CTA chest abdomen pelvis findings no evidence of dissection reviewed incidental findings of umbilical hernias and bilateral inguinal hernias at only contained fat and BPH provided with a copy of the radiographic findings  Reviewed that differential diagnosis still includes the possibility of gallbladder dysfunction  Will recheck a delta EKG and troponin patient still complains of a mild burning sensation in the anterior chest   Offered observation for cardiology evaluation in the a m  Patient prefers to go outpatient if repeat EKG and troponin are negative will order stress echo as an outpatient   [address] from 7 to 8 recommend outpt followup with stress echo toradol helped a little; to f/u with PMD for stress echo results  Reviewed alternative dx eg  Biliary collar recommended low-fat diet and can follow-up with pain management for thoracic spine issues prescribed muscle relaxants 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 with this medication patient comfortable with plan repeat extensively reviewed signs and symptoms and reasons for return    Was provided with copy of EKG and labs and reviewed   2227 Right AC  PIV DC by me with catheter intact             HEART Risk Score    Flowsheet Row Most Recent Value   Heart Score Risk Calculator    History 1 Filed at: 12/04/2022 1828   ECG 1 Filed at: 12/04/2022 1828   Age 1 Filed at: 12/04/2022 1828   Risk Factors 2 Filed at: 12/04/2022 1828   Troponin 0 Filed at: 12/04/2022 1828   HEART Score 5 Filed at: 12/04/2022 1828                                      [facility]  Number of Diagnoses or Management Options  Diagnosis management comments: Mdm:  Will assess for acute coronary syndrome pulmonary embolism dissection patient's symptoms may be related to cholecystitis will check LFTs and lipase  Exercise stress test 8/17/2021 was negative for ischemia      Disposition  Final diagnoses:   Chest pain     Time reflects when diagnosis was documented in both MDM as applicable and the Disposition within this note     Time User Action Codes Description Comment    12/4/2022 10:05 PM Melanie Lazo Add [R07 9] Chest pain       ED Disposition     ED Disposition   Discharge    Condition   Stable    Date/Time   Sun Dec 4, 2022 10:05 PM    Comment   Burke Madrigal discharge to home/self care  Follow-up Information     Follow up With Specialties Details Why Contact Info Additional Information    Yury Lauren,  Internal Medicine, Hospice Services Call in 1 day follow up of stress test re-eval of chest pain abdm pain [address] [facility] [address] [facility] Pain Medicine Call  for recheck of symptoms [address] [facility] [address]  [facility] [address], [place], [address],  [phone]          Patient's Medications   Discharge Prescriptions    METHOCARBAMOL (ROBAXIN-750) 750 MG TABLET    Take 1 tablet (750 mg total) by mouth every 6 (six) hours as needed for muscle spasms for up to 20 doses       Start Date: 12/4/2022 End Date: --       Order Dose: 750 mg       Quantity: 20 tablet    Refills: 0       Outpatient Discharge Orders   Echo stress test, exercise   Standing Status: Future Standing Exp   Date: 12/04/26       PDMP Review     None          ED Provider  Electronically Signed by           Liana Yao MD  12/04/22 5933

## 2022-12-05 ENCOUNTER — TELEPHONE (OUTPATIENT)
Dept: FAMILY MEDICINE CLINIC | Facility: CLINIC | Age: 47
End: 2022-12-05

## 2022-12-05 DIAGNOSIS — K21.9 GASTROESOPHAGEAL REFLUX DISEASE WITHOUT ESOPHAGITIS: ICD-10-CM

## 2022-12-05 LAB
ATRIAL RATE: 65 BPM
ATRIAL RATE: 67 BPM
ATRIAL RATE: 67 BPM
P AXIS: -4 DEGREES
P AXIS: 31 DEGREES
P AXIS: 40 DEGREES
PR INTERVAL: 126 MS
PR INTERVAL: 140 MS
PR INTERVAL: 150 MS
QRS AXIS: -1 DEGREES
QRS AXIS: -2 DEGREES
QRS AXIS: 1 DEGREES
QRSD INTERVAL: 106 MS
QRSD INTERVAL: 106 MS
QRSD INTERVAL: 108 MS
QT INTERVAL: 406 MS
QT INTERVAL: 412 MS
QT INTERVAL: 418 MS
QTC INTERVAL: 429 MS
QTC INTERVAL: 434 MS
QTC INTERVAL: 435 MS
T WAVE AXIS: 12 DEGREES
T WAVE AXIS: 13 DEGREES
T WAVE AXIS: 13 DEGREES
VENTRICULAR RATE: 65 BPM
VENTRICULAR RATE: 67 BPM
VENTRICULAR RATE: 67 BPM

## 2022-12-05 RX ORDER — OMEPRAZOLE 20 MG/1
20 CAPSULE, DELAYED RELEASE ORAL DAILY
Qty: 30 CAPSULE | Refills: 0 | Status: SHIPPED | OUTPATIENT
Start: 2022-12-05

## 2022-12-05 NOTE — TELEPHONE ENCOUNTER
PT SEEN IN ER YESTERDAY FOR CHEST PAIN, HE IS SCHEDULED FOR STRESS TEST TOMORROW AFTERNOON, HE IS REQUESTING APPT WITH YOU, HE CAN NOT GO BACK TO WORK UNTIL HE SEES YOU  HE IS HOPING FOR AN APPT WED OR Thursday   PLEASE ADVISE

## 2022-12-05 NOTE — DISCHARGE INSTRUCTIONS
Enteric coated baby aspirin 81mg daily  Aleve 2 tabs twice daily with food OR ibuprofen 200-800mg every 8 hours with food as needed for pain   Methocarbamol 750mg every 6 hours as needed for spasm recommend no drinking driving or heavy machinery are used for 6 hours after discharge    Return with any shortness of breath worsening or changing chest pain lightheadedness numbness tingling weakness fever vomiting sweats not feeling right or any concerns

## 2022-12-06 ENCOUNTER — HOSPITAL ENCOUNTER (OUTPATIENT)
Dept: NON INVASIVE DIAGNOSTICS | Facility: HOSPITAL | Age: 47
Discharge: HOME | End: 2022-12-06

## 2022-12-06 DIAGNOSIS — R07.9 CHEST PAIN: ICD-10-CM

## 2022-12-06 LAB
ARRHY DURING EX: NORMAL
BASELINE ST DEPRESSION: 0 MM
CHEST PAIN STATEMENT: NORMAL
MAX DIASTOLIC BP: 80 MMHG
MAX HEART RATE: 176 BPM
MAX HR PERCENT: 101 %
MAX HR: 176 BPM
MAX PREDICTED HEART RATE: 173 BPM
MAX. SYSTOLIC BP: 180 MMHG
PROTOCOL NAME: NORMAL
RATE PRESSURE PRODUCT: NORMAL
REASON FOR TERMINATION: NORMAL
SL CV LV EF: 55
SL CV STRESS RECOVERY BP: NORMAL MMHG
SL CV STRESS RECOVERY HR: 95 BPM
SL CV STRESS RECOVERY O2 SAT: 99 %
SL CV STRESS STAGE REACHED: 4
STRESS ANGINA INDEX: 0
STRESS BASELINE BP: NORMAL MMHG
STRESS BASELINE HR: 62 BPM
STRESS O2 SAT REST: 98 %
STRESS PEAK HR: 176 BPM
STRESS POST ESTIMATED WORKLOAD: 13.4 METS
STRESS POST EXERCISE DUR MIN: 10 MIN
STRESS POST EXERCISE DUR SEC: 0 SEC
STRESS POST O2 SAT PEAK: 98 %
STRESS POST PEAK BP: 180 MMHG
TARGET HR FORMULA: NORMAL
TEST INDICATION: NORMAL
TIME IN EXERCISE PHASE: NORMAL

## 2022-12-08 ENCOUNTER — OFFICE VISIT (OUTPATIENT)
Dept: FAMILY MEDICINE CLINIC | Facility: CLINIC | Age: 47
End: 2022-12-08

## 2022-12-08 ENCOUNTER — APPOINTMENT (OUTPATIENT)
Dept: LAB | Facility: MEDICAL CENTER | Age: 47
End: 2022-12-08

## 2022-12-08 VITALS
RESPIRATION RATE: 18 BRPM | HEIGHT: 71 IN | DIASTOLIC BLOOD PRESSURE: 78 MMHG | WEIGHT: 279 LBS | SYSTOLIC BLOOD PRESSURE: 142 MMHG | HEART RATE: 78 BPM | TEMPERATURE: 98.3 F | BODY MASS INDEX: 39.06 KG/M2

## 2022-12-08 DIAGNOSIS — R73.9 HYPERGLYCEMIA: ICD-10-CM

## 2022-12-08 DIAGNOSIS — G47.33 OBSTRUCTIVE SLEEP APNEA: ICD-10-CM

## 2022-12-08 DIAGNOSIS — Z00.00 ANNUAL PHYSICAL EXAM: ICD-10-CM

## 2022-12-08 DIAGNOSIS — R63.5 ABNORMAL WEIGHT GAIN: ICD-10-CM

## 2022-12-08 DIAGNOSIS — R07.9 CHEST PAIN, UNSPECIFIED TYPE: Primary | ICD-10-CM

## 2022-12-08 LAB — INSULIN SERPL-ACNC: 22.7 MU/L (ref 3–25)

## 2022-12-08 RX ORDER — CLINDAMYCIN HYDROCHLORIDE 150 MG/1
CAPSULE ORAL
COMMUNITY
Start: 2022-12-07

## 2022-12-08 NOTE — PATIENT INSTRUCTIONS

## 2022-12-08 NOTE — PROGRESS NOTES
ADULT ANNUAL [address] PRIMARY CARE    NAME: Dorinda Rouse  AGE: 52 y o  SEX: male  : 1975     DATE: 2022     Assessment and Plan:     Problem List Items Addressed This Visit        Other    Annual physical exam    Chest pain - Primary    Relevant Orders    Ambulatory Referral to Cardiology   Referral to Dr Brittaney Soto based on equivocal stress test and history of symptoms  Low fat diet Rx fbw  Stay hydrated Limit nsaid use  Will clarify date of last egd ? Need GI followup based on chest sxs if cardiology cleared   Deferred flu shot  Rto annual/prn after testing     Immunizations and preventive care screenings were discussed with patient today  Appropriate education was printed on patient's after visit summary  Counseling:  · Exercise: the importance of regular exercise/physical activity was discussed  Recommend exercise 3-5 times per week for at least 30 minutes  Depression Screening and Follow-up Plan: Patient was screened for depression during today's encounter  They screened negative with a PHQ-2 score of 0  Tobacco Cessation Counseling: Tobacco cessation counseling was provided  The patient is sincerely urged to quit consumption of tobacco  He is ready to quit tobacco  Medication options not discussed  No follow-ups on file  Chief Complaint:     Chief Complaint   Patient presents with   • Follow-up     ER  History of Present Illness:     Adult Annual Physical   Patient here for a comprehensive physical exam  The patient reports problems - Pt in ER this week for chest pain and had stress test following results overall ok but read as equivocal CHest sxs ahve improved Lot of stress with his schedules Prior thoracic compression fx and thought that had flared   Diet and Physical Activity  · Diet/Nutrition: poor diet  · Exercise: no formal exercise        Depression Screening  PHQ-2/9 Depression Screening    Little interest or pleasure in doing things: 0 - not at all  Feeling down, depressed, or hopeless: 0 - not at all  PHQ-2 Score: 0  PHQ-2 Interpretation: Negative depression screen       General Health  · Sleep: gets 4-6 hours of sleep on average  · Hearing: normal - bilateral   · Vision: no vision problems  · Dental: regular dental visits   Health  · Symptoms include: none     Review of Systems:     Review of Systems   Constitutional: Negative for chills and fever  HENT: Negative  Eyes: Negative for visual disturbance  Respiratory: Negative for cough and shortness of breath  Cardiovascular: Positive for chest pain  Negative for palpitations and leg swelling  Gastrointestinal: Negative for abdominal distention and abdominal pain  Genitourinary: Negative  Musculoskeletal: Positive for back pain  Neurological: Negative for dizziness, light-headedness and headaches  Psychiatric/Behavioral: Negative for sleep disturbance  The patient is not nervous/anxious  Past Medical History:     Past Medical History:   Diagnosis Date   • Diverticulitis of colon    • GERD (gastroesophageal reflux disease)    • Obesity       Past Surgical History:     History reviewed  No pertinent surgical history     Family History:     Family History   Problem Relation Age of Onset   • Diabetes Mother    • Cerebral aneurysm Mother    • Stroke Father         syndrome   • Stroke Maternal Grandmother         syndrome   • Heart disease Paternal Grandfather    • Cancer Family    • Esophageal cancer Family       Social History:     Social History     Socioeconomic History   • Marital status: /Civil Union     Spouse name: None   • Number of children: None   • Years of education: None   • Highest education level: None   Occupational History   • None   Tobacco Use   • Smoking status: Some Days     Packs/day: 0 25     Types: Cigarettes, Cigars   • Smokeless tobacco: Never   • Tobacco comments:     (unknown if ever smoked-as per [employer])   Vaping Use   • Vaping Use: Never used   Substance and Sexual Activity   • Alcohol use: Yes     Comment: socially   • Drug use: No   • Sexual activity: None   Other Topics Concern   • None   Social History Narrative   • None     Social Determinants of Health     Financial Resource Strain: Not on file   Food Insecurity: Not on file   Transportation Needs: Not on file   Physical Activity: Not on file   Stress: Not on file   Social Connections: Not on file   Intimate Partner Violence: Not on file   Housing Stability: Not on file      Current Medications:     Current Outpatient Medications   Medication Sig Dispense Refill   • azelastine (ASTELIN) 0 1 % nasal spray 1 spray into each nostril 2 (two) times a day Use in each nostril as directed 30 mL 1   • fluticasone (FLONASE) 50 mcg/act nasal spray 1 spray into each nostril daily 16 g 0   • indomethacin (INDOCIN) 50 mg capsule      • Loratadine-Pseudoephedrine (CLARITIN-D 12 HOUR PO) Take by mouth     • methocarbamol (Robaxin-750) 750 mg tablet Take 1 tablet (750 mg total) by mouth every 6 (six) hours as needed for muscle spasms for up to 20 doses 20 tablet 0   • omeprazole (PriLOSEC) 20 mg delayed release capsule Take 1 capsule (20 mg total) by mouth daily 30 capsule 0   • tamsulosin (FLOMAX) 0 4 mg Take 1 capsule (0 4 mg total) by mouth in the morning and 1 capsule (0 4 mg total) before bedtime  Do all this for 180 doses  180 capsule 3   • valACYclovir (VALTREX) 1,000 mg tablet Take 1 tablet (1,000 mg total) by mouth 2 (two) times a day for 7 days 14 tablet 3   • clindamycin (CLEOCIN) 150 mg capsule        No current facility-administered medications for this visit  Allergies:      Allergies   Allergen Reactions   • Azithromycin    • Penicillins    • Doxycycline Rash      Physical Exam:     /78   Pulse 78   Temp 98 3 °F (36 8 °C) (Temporal)   Resp 18   Ht 5' 11" (1 803 m)   Wt 127 kg (279 lb)   BMI 38 91 kg/m²     Physical Exam  Vitals reviewed  Constitutional:       General: He is not in acute distress  Appearance: Normal appearance  He is not ill-appearing, toxic-appearing or diaphoretic  HENT:      Head: Normocephalic and atraumatic  Right Ear: External ear normal       Left Ear: External ear normal       Nose: Nose normal       Mouth/Throat:      Mouth: Mucous membranes are dry  Eyes:      General: No scleral icterus  Extraocular Movements: Extraocular movements intact  Conjunctiva/sclera: Conjunctivae normal       Pupils: Pupils are equal, round, and reactive to light  Cardiovascular:      Rate and Rhythm: Normal rate and regular rhythm  Pulses: Normal pulses  Heart sounds: Normal heart sounds  No murmur heard  Pulmonary:      Effort: Pulmonary effort is normal  No respiratory distress  Breath sounds: Normal breath sounds  No wheezing  Abdominal:      General: Bowel sounds are normal  There is no distension  Palpations: Abdomen is soft  Tenderness: There is no abdominal tenderness  Musculoskeletal:      Cervical back: Normal range of motion and neck supple  No rigidity  Right lower leg: No edema  Left lower leg: No edema  Lymphadenopathy:      Cervical: No cervical adenopathy  Skin:     General: Skin is dry  Coloration: Skin is not jaundiced or pale  Neurological:      General: No focal deficit present  Mental Status: He is alert and oriented to person, place, and time  Mental status is at baseline  Psychiatric:         Mood and Affect: Mood normal          Behavior: Behavior normal          Thought Content:  Thought content normal          Judgment: Judgment normal           Iglesia Talley DO  [address] [facility]

## 2022-12-09 LAB
ALBUMIN SERPL BCP-MCNC: 3.9 G/DL (ref 3.5–5)
ALP SERPL-CCNC: 51 U/L (ref 46–116)
ALT SERPL W P-5'-P-CCNC: 26 U/L (ref 12–78)
ANION GAP SERPL CALCULATED.3IONS-SCNC: 3 MMOL/L (ref 4–13)
AST SERPL W P-5'-P-CCNC: 18 U/L (ref 5–45)
BILIRUB SERPL-MCNC: 0.61 MG/DL (ref 0.2–1)
BUN SERPL-MCNC: 20 MG/DL (ref 5–25)
CALCIUM SERPL-MCNC: 9.5 MG/DL (ref 8.3–10.1)
CHLORIDE SERPL-SCNC: 106 MMOL/L (ref 96–108)
CHOLEST SERPL-MCNC: 173 MG/DL
CO2 SERPL-SCNC: 28 MMOL/L (ref 21–32)
CREAT SERPL-MCNC: 1.02 MG/DL (ref 0.6–1.3)
GFR SERPL CREATININE-BSD FRML MDRD: 87 ML/MIN/1.73SQ M
GLUCOSE P FAST SERPL-MCNC: 100 MG/DL (ref 65–99)
HDLC SERPL-MCNC: 38 MG/DL
LDLC SERPL CALC-MCNC: 112 MG/DL (ref 0–100)
NONHDLC SERPL-MCNC: 135 MG/DL
POTASSIUM SERPL-SCNC: 4.2 MMOL/L (ref 3.5–5.3)
PROT SERPL-MCNC: 7.4 G/DL (ref 6.4–8.4)
SODIUM SERPL-SCNC: 137 MMOL/L (ref 135–147)
TRIGL SERPL-MCNC: 116 MG/DL
TSH SERPL DL<=0.05 MIU/L-ACNC: 1.92 UIU/ML (ref 0.45–4.5)

## 2022-12-10 LAB
EST. AVERAGE GLUCOSE BLD GHB EST-MCNC: 103 MG/DL
HBA1C MFR BLD: 5.2 %

## 2023-01-13 ENCOUNTER — CONSULT (OUTPATIENT)
Dept: CARDIOLOGY CLINIC | Facility: CLINIC | Age: 48
End: 2023-01-13

## 2023-01-13 VITALS
WEIGHT: 285 LBS | DIASTOLIC BLOOD PRESSURE: 82 MMHG | HEART RATE: 72 BPM | HEIGHT: 71 IN | SYSTOLIC BLOOD PRESSURE: 124 MMHG | BODY MASS INDEX: 39.9 KG/M2

## 2023-01-13 DIAGNOSIS — G47.33 OSA ON CPAP: ICD-10-CM

## 2023-01-13 DIAGNOSIS — Z99.89 OSA ON CPAP: ICD-10-CM

## 2023-01-13 DIAGNOSIS — R07.9 CHEST PAIN, UNSPECIFIED TYPE: Primary | ICD-10-CM

## 2023-01-13 DIAGNOSIS — E66.01 SEVERE OBESITY (BMI 35.0-39.9) WITH COMORBIDITY (HCC): ICD-10-CM

## 2023-01-13 DIAGNOSIS — E78.49 OTHER HYPERLIPIDEMIA: ICD-10-CM

## 2023-01-13 NOTE — PROGRESS NOTES
Cardiology Consultation     Kirby Dominguez  [id number]  1975  [facility], [facility] [address] [id number]      1  Chest pain, unspecified type  Ambulatory Referral to Cardiology      2  Severe obesity (BMI 35 0-39  9) with comorbidity ([address] )        3  Other hyperlipidemia        4  YAMIL on CPAP            Discussion/Summary:  1  Chest pain  2  Hyperlipidemia  3  Obesity  4  Obstructive sleep apnea compliant with CPAP therapy    -Lipid panel 12/8/2022 with total cholesterol 173, triglyceride 116, HDL 38,  calculated ASCVD risk 6 8%  -In the setting of equivocal stress echocardiogram will order exercise stress nuclear perfusion study which was recommended  -Patient on dietary and lifestyle modifications including following sodium restriction to less than 1800 mg of sodium daily along with a low-fat heart healthy diet with weight loss as goal BMI is less than 30  -Patient will monitor blood pressure readings and let our office know if he has any significant issue  -He will continue good compliance with CPAP therapy  -I will see patient in 1 month or sooner if necessary once testing is completed  -Patient counseled if he were to have any warning or alarm type symptoms he is to seek emergency medical care immediately  History of Present Illness:  -Patient is a 43-year-old male past medical history significant for diverticulitis, documented history of asthma, obstructive sleep apnea compliant with CPAP therapy, hyperlipidemia, gout, and chronic lymphedema along with chronic back pain from compression fracture and thoracic spine who presents to the office today for ER follow-up after being seen and evaluated on 12/4/2022    Patient had even undergone exercise stress echocardiogram which was listed as equivocal study due to technically difficult imaging on echo study   -Patient does have significant NSAID use due to chronic arthropathies and while he does not have active symptoms of chest pain, palpitations, lightheadedness or dizziness, loss of consciousness, shortness of breath, orthopnea or bend apnea in the office today does note that intermittently he does still get chest pain  He is a fairly active individual as a  and while it is not always with exertion it is hard to separate timing wise because he is constantly on his feet  -Patient has social tobacco use with intermittent cigar occasionally, occasional alcohol use socially, and denies any illicit drug use  -Patient has family history of heart disease particularly in father  from stroke father dying from stroke at age 76, paternal grandmother dying from MI around age 62s, maternal grandfather dying from stroke around age 76s      Patient Active Problem List   Diagnosis   • Asthma   • Diverticulitis of colon   • Gastroesophageal reflux disease without esophagitis   • Severe obesity (BMI 35 0-39  9) with comorbidity ([address] )   • Obstructive sleep apnea   • Peripheral edema   • Acute gout involving toe of right foot   • Other hyperlipidemia   • Acute idiopathic gout involving toe of right foot   • Urinary frequency   • Hematuria   • Left otitis media with effusion   • Conductive hearing loss of left ear   • Annual physical exam   • Chest pain     Past Medical History:   Diagnosis Date   • Diverticulitis of colon    • GERD (gastroesophageal reflux disease)    • Obesity      Social History     Socioeconomic History   • Marital status: /Civil Union     Spouse name: Not on file   • Number of children: Not on file   • Years of education: Not on file   • Highest education level: Not on file   Occupational History   • Not on file   Tobacco Use   • Smoking status: Some Days     Packs/day: 0 25     Types: Cigarettes, Cigars   • Smokeless tobacco: Never   • Tobacco comments:     (unknown if ever smoked-as per [employer])   Vaping Use   • Vaping Use: Never used   Substance and Sexual Activity   • Alcohol use: Yes     Comment: socially   • Drug use: No   • Sexual activity: Not on file   Other Topics Concern   • Not on file   Social History Narrative   • Not on file     Social Determinants of Health     Financial Resource Strain: Not on file   Food Insecurity: Not on file   Transportation Needs: Not on file   Physical Activity: Not on file   Stress: Not on file   Social Connections: Not on file   Intimate Partner Violence: Not on file   Housing Stability: Not on file      Family History   Problem Relation Age of Onset   • Diabetes Mother    • Cerebral aneurysm Mother    • Stroke Father         syndrome   • Stroke Maternal Grandmother         syndrome   • Heart disease Paternal Grandfather    • Cancer Family    • Esophageal cancer Family      History reviewed  No pertinent surgical history  Current Outpatient Medications:   •  azelastine (ASTELIN) 0 1 % nasal spray, 1 spray into each nostril 2 (two) times a day Use in each nostril as directed, Disp: 30 mL, Rfl: 1  •  indomethacin (INDOCIN) 50 mg capsule, , Disp: , Rfl:   •  Loratadine-Pseudoephedrine (CLARITIN-D 12 HOUR PO), Take by mouth, Disp: , Rfl:   •  omeprazole (PriLOSEC) 20 mg delayed release capsule, Take 1 capsule (20 mg total) by mouth daily, Disp: 30 capsule, Rfl: 2  •  tamsulosin (FLOMAX) 0 4 mg, Take 1 capsule (0 4 mg total) by mouth in the morning and 1 capsule (0 4 mg total) before bedtime  Do all this for 180 doses  , Disp: 180 capsule, Rfl: 3  •  valACYclovir (VALTREX) 1,000 mg tablet, Take 1 tablet (1,000 mg total) by mouth 2 (two) times a day for 7 days, Disp: 14 tablet, Rfl: 3  •  clindamycin (CLEOCIN) 150 mg capsule, , Disp: , Rfl:   •  fluticasone (FLONASE) 50 mcg/act nasal spray, 1 spray into each nostril daily (Patient not taking: Reported on 1/13/2023), Disp: 16 g, Rfl: 0  •  methocarbamol (Robaxin-750) 750 mg tablet, Take 1 tablet (750 mg total) by mouth every 6 (six) hours as needed for muscle spasms for up to 20 doses (Patient not taking: Reported on 1/13/2023), Disp: 20 tablet, Rfl: 0  Allergies   Allergen Reactions   • Azithromycin    • Penicillins    • Doxycycline Rash         Labs:  Appointment on 12/08/2022   Component Date Value   • Insulin, Fasting 12/08/2022 22 7    • Hemoglobin A1C 12/08/2022 5 2    • EAG 12/08/2022 103    • Sodium 12/08/2022 137    • Potassium 12/08/2022 4 2    • Chloride 12/08/2022 106    • CO2 12/08/2022 28    • ANION GAP 12/08/2022 3 (L)    • BUN 12/08/2022 20    • Creatinine 12/08/2022 1 02    • Glucose, Fasting 12/08/2022 100 (H)    • Calcium 12/08/2022 9 5    • AST 12/08/2022 18    • ALT 12/08/2022 26    • Alkaline Phosphatase 12/08/2022 51    • Total Protein 12/08/2022 7 4    • Albumin 12/08/2022 3 9    • Total Bilirubin 12/08/2022 0 61    • eGFR 12/08/2022 87    • Cholesterol 12/08/2022 173    • Triglycerides 12/08/2022 116    • HDL, Direct 12/08/2022 38 (L)    • LDL Calculated 12/08/2022 112 (H)    • Non-HDL-Chol (CHOL-HDL) 12/08/2022 135    • TSH 3RD GENERATON 12/08/2022 1 920    Hospital Outpatient Visit on 12/06/2022   Component Date Value   • Baseline HR 12/06/2022 62    • Baseline BP 12/06/2022 122/80    • O2 sat rest 12/06/2022 98    • Stress peak HR 12/06/2022 176    • Post peak BP 12/06/2022 180    • Rate Pressure Product 12/06/2022 31,680 0    • O2 sat peak 12/06/2022 98    • Recovery HR 12/06/2022 95    • Recovery BP 12/06/2022 140/80    • O2 sat recovery 12/06/2022 99    • Max HR 12/06/2022 176    • Max HR Percent 12/06/2022 101    • Exercise duration (min) 12/06/2022 10    • Exercise duration (sec) 12/06/2022 0    • Estimated workload 12/06/2022 13 4    • Angina Index 12/06/2022 0    • Stress Stage Reached 12/06/2022 4 0    • LV EF 12/06/2022 55    • Base ST Depresion (mm) 12/06/2022 0    • Protocol Name 12/06/2022 AILYN    • Time In Exercise Phase 12/06/2022 00:10:00    • MAX   SYSTOLIC BP 28/21/1398 464    • Max Diastolic Bp 79/18/7861 80    • Max Heart Rate 12/06/2022 176    • Max Predicted Heart Rate 12/06/2022 173    • Reason for Termination 12/06/2022 Dyspnea    • Test Indication 12/06/2022 Screening for CAD    • Target Hr Formular 12/06/2022 (220 - Age)*100%    • Arrhy During Ex 12/06/2022 ventricular premature beats-isolated    • Chest Pain Statement 12/06/2022 none    Admission on 12/04/2022, Discharged on 12/04/2022   Component Date Value   • Ventricular Rate 12/04/2022 67    • Atrial Rate 12/04/2022 67    • FL Interval 12/04/2022 126    • QRSD Interval 12/04/2022 108    • QT Interval 12/04/2022 406    • QTC Interval 12/04/2022 429    • P Axis 12/04/2022 -4    • QRS Axis 12/04/2022 -2    • T Wave Axis 12/04/2022 12    • WBC 12/04/2022 10 40 (H)    • RBC 12/04/2022 5 25    • Hemoglobin 12/04/2022 15 0    • Hematocrit 12/04/2022 44 6    • MCV 12/04/2022 85    • MCH 12/04/2022 28 6    • MCHC 12/04/2022 33 6    • RDW 12/04/2022 12 8    • MPV 12/04/2022 11 1    • Platelets 47/24/7557 204    • nRBC 12/04/2022 0    • Neutrophils Relative 12/04/2022 61    • Immat GRANS % 12/04/2022 0    • Lymphocytes Relative 12/04/2022 28    • Monocytes Relative 12/04/2022 8    • Eosinophils Relative 12/04/2022 3    • Basophils Relative 12/04/2022 0    • Neutrophils Absolute 12/04/2022 6 29    • Immature Grans Absolute 12/04/2022 0 03    • Lymphocytes Absolute 12/04/2022 2 89    • Monocytes Absolute 12/04/2022 0 85    • Eosinophils Absolute 12/04/2022 0 30    • Basophils Absolute 12/04/2022 0 04    • Sodium 12/04/2022 140    • Potassium 12/04/2022 4 0    • Chloride 12/04/2022 104    • CO2 12/04/2022 27    • ANION GAP 12/04/2022 9    • BUN 12/04/2022 22    • Creatinine 12/04/2022 0 83    • Glucose 12/04/2022 94    • Calcium 12/04/2022 8 7    • AST 12/04/2022 21    • ALT 12/04/2022 28    • Alkaline Phosphatase 12/04/2022 57    • Total Protein 12/04/2022 7 5    • Albumin 12/04/2022 3 8    • Total Bilirubin 12/04/2022 0 30    • eGFR 12/04/2022 104    • Lipase 12/04/2022 159    • D-Dimer, Quant 12/04/2022 0 48    • HS TnI random 12/04/2022 7 (L)    • Protime 12/04/2022 13 0    • INR 12/04/2022 0 97    • PTT 12/04/2022 27    • Magnesium 12/04/2022 2 1    • HS TnI random 12/04/2022 8    • Ventricular Rate 12/04/2022 67    • Atrial Rate 12/04/2022 67    • CO Interval 12/04/2022 150    • QRSD Interval 12/04/2022 106    • QT Interval 12/04/2022 412    • QTC Interval 12/04/2022 435    • P Axis 12/04/2022 40    • QRS Axis 12/04/2022 1    • T Wave Axis 12/04/2022 13    • Ventricular Rate 12/04/2022 65    • Atrial Rate 12/04/2022 65    • CO Interval 12/04/2022 140    • QRSD Interval 12/04/2022 106    • QT Interval 12/04/2022 418    • QTC Interval 12/04/2022 434    • P Axis 12/04/2022 31    • QRS Axis 12/04/2022 -1    • T Wave [place] 12/04/2022 13         Imaging: No results found  Review of Systems:  Review of Systems   Constitutional: Negative for chills, diaphoresis, fatigue and fever  HENT: Negative for trouble swallowing and voice change  Eyes: Negative for pain and redness  Respiratory: Negative for shortness of breath and wheezing  Cardiovascular: Negative for chest pain, palpitations and leg swelling  Gastrointestinal: Negative for abdominal pain, constipation, diarrhea, nausea and vomiting  Genitourinary: Negative for dysuria  Musculoskeletal: Positive for arthralgias and back pain  Negative for neck pain and neck stiffness  All other systems reviewed and are negative  Vitals:    01/13/23 1043   BP: 124/82   Pulse: 72   Weight: 129 kg (285 lb)   Height: 5' 11" (1 803 m)     Vitals:    01/13/23 1043   Weight: 129 kg (285 lb)     Height: 5' 11" (180 3 cm)     Physical Exam:  Physical Exam  Vitals reviewed  Constitutional:       General: He is not in acute distress  Appearance: He is obese  He is not diaphoretic  HENT:      Head: Normocephalic and atraumatic     Eyes:      General:         Right eye: No discharge  Left eye: No discharge  Neck:      Comments: Trachea midline, neck obese, difficult to assess JVD  Cardiovascular:      Rate and Rhythm: Normal rate and regular rhythm  Heart sounds: No friction rub  Pulmonary:      Effort: Pulmonary effort is normal  No respiratory distress  Breath sounds: No wheezing  Chest:      Chest wall: No tenderness  Abdominal:      General: Bowel sounds are normal       Palpations: Abdomen is soft  Tenderness: There is no abdominal tenderness  There is no rebound  Musculoskeletal:      Right lower leg: Edema (trace-1+) present  Left lower leg: Edema (trace-1+) present  Skin:     General: Skin is warm and dry  Neurological:      Mental Status: He is alert  Comments: Awake, alert, able to answer questions appropriately, able to move extremities bilaterally     Psychiatric:         Mood and Affect: Mood normal          Behavior: Behavior normal

## 2023-02-02 ENCOUNTER — HOSPITAL ENCOUNTER (OUTPATIENT)
Dept: NUCLEAR MEDICINE | Facility: HOSPITAL | Age: 48
Discharge: HOME | End: 2023-02-02
Attending: INTERNAL MEDICINE

## 2023-02-02 DIAGNOSIS — R07.9 CHEST PAIN, UNSPECIFIED TYPE: ICD-10-CM

## 2023-02-02 LAB
MAX HR PERCENT: 98 %
MAX HR: 169 BPM
NUC STRESS EJECTION FRACTION: 66 %
RATE PRESSURE PRODUCT: NORMAL
SL CV REST NUCLEAR ISOTOPE DOSE: 15.8 MCI
SL CV STRESS NUCLEAR ISOTOPE DOSE: 44.7 MCI
SL CV STRESS RECOVERY BP: NORMAL MMHG
SL CV STRESS RECOVERY HR: 85 BPM
SL CV STRESS RECOVERY O2 SAT: 99 %
SL CV STRESS STAGE REACHED: 4
STRESS ANGINA INDEX: 0
STRESS BASELINE BP: NORMAL MMHG
STRESS BASELINE HR: 64 BPM
STRESS DUKE TREADMILL SCORE: 10
STRESS O2 SAT REST: 99 %
STRESS PEAK HR: 169 BPM
STRESS POST ESTIMATED WORKLOAD: 11.7 METS
STRESS POST EXERCISE DUR MIN: 10 MIN
STRESS POST EXERCISE DUR SEC: 1 SEC
STRESS POST O2 SAT PEAK: 99 %
STRESS POST PEAK BP: 194 MMHG
STRESS ST DEPRESSION: 0 MM
STRESS/REST PERFUSION RATIO: 0.84

## 2023-02-14 LAB
CHEST PAIN STATEMENT: NORMAL
MAX DIASTOLIC BP: 76 MMHG
MAX HEART RATE: 169 BPM
MAX PREDICTED HEART RATE: 172 BPM
MAX. SYSTOLIC BP: 194 MMHG
PROTOCOL NAME: NORMAL
REASON FOR TERMINATION: NORMAL
TARGET HR FORMULA: NORMAL
TEST INDICATION: NORMAL
TIME IN EXERCISE PHASE: NORMAL

## 2023-02-20 ENCOUNTER — CLINICAL SUPPORT (OUTPATIENT)
Dept: CARDIOLOGY CLINIC | Facility: CLINIC | Age: 48
End: 2023-02-20

## 2023-02-20 ENCOUNTER — OFFICE VISIT (OUTPATIENT)
Dept: CARDIOLOGY CLINIC | Facility: CLINIC | Age: 48
End: 2023-02-20

## 2023-02-20 VITALS
HEIGHT: 71 IN | BODY MASS INDEX: 39.48 KG/M2 | HEART RATE: 64 BPM | DIASTOLIC BLOOD PRESSURE: 82 MMHG | SYSTOLIC BLOOD PRESSURE: 124 MMHG | WEIGHT: 282 LBS

## 2023-02-20 DIAGNOSIS — G47.33 OBSTRUCTIVE SLEEP APNEA: ICD-10-CM

## 2023-02-20 DIAGNOSIS — E66.01 SEVERE OBESITY (BMI 35.0-39.9) WITH COMORBIDITY (HCC): ICD-10-CM

## 2023-02-20 DIAGNOSIS — E78.49 OTHER HYPERLIPIDEMIA: ICD-10-CM

## 2023-02-20 DIAGNOSIS — R00.2 PALPITATIONS: ICD-10-CM

## 2023-02-20 DIAGNOSIS — R00.2 PALPITATIONS: Primary | ICD-10-CM

## 2023-02-20 NOTE — PROGRESS NOTES
Cardiology Follow Up    Case Rivers  [id number]  1975  [facility], [facility]ARD[facility]OLOG[facility] [address] [address]      1  Palpitations  AMB extended holter monitor      2  Other hyperlipidemia        3  Severe obesity (BMI 35 0-39  9) with comorbidity ([address] )        4  Obstructive sleep apnea            Discussion/Summary:  1  Chest pain-improved  2  Intermittent palpitations  3  Hyperlipidemia  4  Obesity  5  Obstructive sleep apnea compliant with CPAP therapy    -Exercise nuclear stress test 2/2/2023 showing no perfusion defects no evidence of ischemia with patient able to exercise for 10 minutes and 1 second with no angina during testing  -We will check 1 week Zio patch monitor to evaluate overall ectopic burden for patient  -Patient counseled on dietary and lifestyle modifications including following a low-salt, low-fat, heart healthy diet with weight loss to goal BMI less than 30 along with continued compliance with CPAP therapy  -In the setting of above unrevealing stress testing with continued intermittent palpitations we will follow-up with patient in 3 months or sooner if necessary once testing is completed  -Patient counseled if he were to have any warning or alarm type symptoms he is to seek emergency medical care immediately  History of Present Illness:  -Patient is a 44-year-old male past medical history significant for diverticulitis, documented history of asthma, obstructive sleep apnea compliant with CPAP therapy, hyperlipidemia, gout, chronic lymphedema along with chronic back pain from compression fractures particularly of the thoracic spine who initially presented to the office in January 2023 after ER evaluation in December 2022    Patient had undergone exercise stress echocardiogram which was listed as equivocal study and I recommended additional evaluation   -Since last office visit patient notes overall he is doing well  He remains physically active and 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 with no significant chest pain  He states while he does still get some intermittent twinges of chest discomfort is not as significant as it was in the past and is overall doing well  He does note some intermittent palpitations couple times a week but they are short-lived lasting less than 5 minutes and do not seem to correlate with either his chest pain or any exertional activities and have not caused him any significant loss of consciousness shortness of breath or other issue  He states at home when he has been checking his blood pressure and has been well controlled and overall he feels well at this time  He has been working harder on his dietary lifestyle modifications as well  Patient Active Problem List   Diagnosis   • Asthma   • Diverticulitis of colon   • Gastroesophageal reflux disease without esophagitis   • Severe obesity (BMI 35 0-39  9) with comorbidity ([address] )   • Obstructive sleep apnea   • Peripheral edema   • Acute gout involving toe of right foot   • Other hyperlipidemia   • Acute idiopathic gout involving toe of right foot   • Urinary frequency   • Hematuria   • Left otitis media with effusion   • Conductive hearing loss of left ear   • Annual physical exam   • Chest pain     Past Medical History:   Diagnosis Date   • Diverticulitis of colon    • GERD (gastroesophageal reflux disease)    • Hyperlipemia    • Obesity      Social History     Socioeconomic History   • Marital status: /Civil Union     Spouse name: Not on file   • Number of children: Not on file   • Years of education: Not on file   • Highest education level: Not on file   Occupational History   • Not on file   Tobacco Use   • Smoking status: Some Days     Packs/day: 0 25     Types: Cigarettes, Cigars   • Smokeless tobacco: Never   • Tobacco comments: (unknown if ever smoked-as per [employer])   Vaping Use   • Vaping Use: Never used   Substance and Sexual Activity   • Alcohol use: Yes     Comment: socially   • Drug use: No   • Sexual activity: Not on file   Other Topics Concern   • Not on file   Social History Narrative   • Not on file     Social Determinants of Health     Financial Resource Strain: Not on file   Food Insecurity: Not on file   Transportation Needs: Not on file   Physical Activity: Not on file   Stress: Not on file   Social Connections: Not on file   Intimate Partner Violence: Not on file   Housing Stability: Not on file      Family History   Problem Relation Age of Onset   • Diabetes Mother    • Cerebral aneurysm Mother    • Stroke Father         syndrome   • Stroke Maternal Grandmother         syndrome   • Heart disease Paternal Grandfather    • Cancer Family    • Esophageal cancer Family      History reviewed  No pertinent surgical history  Current Outpatient Medications:   •  azelastine (ASTELIN) 0 1 % nasal spray, 1 spray into each nostril 2 (two) times a day Use in each nostril as directed, Disp: 30 mL, Rfl: 1  •  clindamycin (CLEOCIN) 150 mg capsule, , Disp: , Rfl:   •  fluticasone (FLONASE) 50 mcg/act nasal spray, 1 spray into each nostril daily, Disp: 16 g, Rfl: 0  •  indomethacin (INDOCIN) 50 mg capsule, , Disp: , Rfl:   •  Loratadine-Pseudoephedrine (CLARITIN-D 12 HOUR PO), Take by mouth, Disp: , Rfl:   •  omeprazole (PriLOSEC) 20 mg delayed release capsule, Take 1 capsule (20 mg total) by mouth daily, Disp: 30 capsule, Rfl: 2  •  tamsulosin (FLOMAX) 0 4 mg, Take 1 capsule (0 4 mg total) by mouth in the morning and 1 capsule (0 4 mg total) before bedtime  Do all this for 180 doses  , Disp: 180 capsule, Rfl: 3  •  valACYclovir (VALTREX) 1,000 mg tablet, Take 1 tablet (1,000 mg total) by mouth 2 (two) times a day for 7 days, Disp: 14 tablet, Rfl: 3  •  methocarbamol (Robaxin-750) 750 mg tablet, Take 1 tablet (750 mg total) by mouth every 6 (six) hours as needed for muscle spasms for up to 20 doses (Patient not taking: Reported on 1/13/2023), Disp: 20 tablet, Rfl: 0  Allergies   Allergen Reactions   • Azithromycin    • Penicillins    • Doxycycline Rash         Labs:  Hospital Outpatient Visit on 02/02/2023   Component Date Value   • Protocol Name 02/02/2023 AILYN    • Time In Exercise Phase 02/02/2023 00:10:01    • MAX  SYSTOLIC BP 90/94/2252 443    • Max Diastolic Bp 09/31/1716 76    • Max Heart Rate 02/02/2023 169    • Max Predicted Heart Rate 02/02/2023 172    • Reason for Termination 02/02/2023 Dyspnea    • Test Indication 02/02/2023 cp    • Target Hr Formular 02/02/2023 (220 - Age)*85%    • Chest Pain Statement 02/02/2023 none    Hospital Outpatient Visit on 02/02/2023   Component Date Value   • Baseline HR 02/02/2023 64    • Baseline BP 02/02/2023 130/72    • O2 sat rest 02/02/2023 99    • Stress peak HR 02/02/2023 169    • Post peak BP 02/02/2023 194    • Rate Pressure Product 02/02/2023 32,786 0    • O2 sat peak 02/02/2023 99    • Recovery HR 02/02/2023 85    • Recovery BP 02/02/2023 138/78    • O2 sat recovery 02/02/2023 99    • Max HR 02/02/2023 169    • Max HR Percent 02/02/2023 98    • Exercise duration (min) 02/02/2023 10    • Exercise duration (sec) 02/02/2023 1    • Estimated workload 02/02/2023 11 7    • Stress Stage Reached 02/02/2023 4 0    • Rest Nuclear Isotope Dose 02/02/2023 15 80    • Stress Nuclear Isotope D* 02/02/2023 44 70    • Angina Index 02/02/2023 0    • Massey Treadmill Score 02/02/2023 10    • ST Depression (mm) 02/02/2023 0    • Stress/rest perfusion ra* 02/02/2023 0 84    • EF (%) 02/02/2023 66         Imaging: NM myocardial perfusion spect (stress and/or rest)    Result Date: 2/2/2023  Narrative: •  Stress ECG: No ST deviation is noted  The ECG was negative for ischemia  The stress ECG is negative for ischemia after maximal exercise, without reproduction of symptoms   •  Perfusion: There are no perfusion defects  •  Stress Function: Left ventricular function post-stress is normal  Post-stress ejection fraction is 66 %  •  Stress Combined Conclusion: The ECG and SPECT imaging portions of the stress study are concordant with no evidence of stress induced myocardial ischemia  Left ventricular perfusion is normal      Stress strip    Result Date: 2/14/2023  Narrative: Confirmed by Pippa Peng ([id number]),  Lionel Parikh ([id number]) on 2/14/2023 12:24:36 PM      Review of Systems:  Review of Systems   Constitutional: Negative for chills, diaphoresis, fatigue and fever  HENT: Negative for trouble swallowing and voice change  Eyes: Negative for pain and redness  Respiratory: Negative for shortness of breath and wheezing  Cardiovascular: Negative for chest pain, palpitations and leg swelling  Gastrointestinal: Negative for abdominal pain, constipation, diarrhea, nausea and vomiting  Genitourinary: Negative for dysuria  Musculoskeletal: Positive for arthralgias  Negative for neck pain and neck stiffness  Skin: Negative for rash  Neurological: Negative for dizziness, syncope, light-headedness and headaches  Psychiatric/Behavioral: Negative for agitation and confusion  All other systems reviewed and are negative  Vitals:    02/20/23 1028   BP: 124/82   Pulse: 64   Weight: 128 kg (282 lb)   Height: 5' 11" (1 803 m)     Vitals:    02/20/23 1028   Weight: 128 kg (282 lb)     Height: 5' 11" (180 3 cm)     Physical Exam:  Physical Exam  Vitals reviewed  Constitutional:       General: He is not in acute distress  Appearance: He is obese  He is not diaphoretic  HENT:      Head: Normocephalic and atraumatic  Eyes:      General:         Right eye: No discharge  Left eye: No discharge  Neck:      Comments: Trachea midline, neck obese, difficult to assess JVD  Cardiovascular:      Rate and Rhythm: Normal rate and regular rhythm  Heart sounds: No friction rub     Pulmonary: Effort: Pulmonary effort is normal  No respiratory distress  Breath sounds: No wheezing  Chest:      Chest wall: No tenderness  Abdominal:      General: Bowel sounds are normal       Palpations: Abdomen is soft  Tenderness: There is no abdominal tenderness  There is no rebound  Musculoskeletal:      Right lower leg: No edema  Left lower leg: No edema  Skin:     General: Skin is warm and dry  Neurological:      Mental Status: He is alert  Comments: Awake, alert, able to answer questions appropriately, able move extremities bilaterally     Psychiatric:         Mood and Affect: Mood normal

## 2023-03-08 ENCOUNTER — TELEPHONE (OUTPATIENT)
Dept: CARDIOLOGY CLINIC | Facility: CLINIC | Age: 48
End: 2023-03-08

## 2023-03-08 ENCOUNTER — CLINICAL SUPPORT (OUTPATIENT)
Dept: CARDIOLOGY CLINIC | Facility: CLINIC | Age: 48
End: 2023-03-08

## 2023-03-08 DIAGNOSIS — R00.2 PALPITATIONS: ICD-10-CM

## 2023-03-08 NOTE — TELEPHONE ENCOUNTER
Attempted to call patient to go over recent Zio patch monitor results  Unfortunately I was not able to reach the patient but did leave a message on his phone with my name and office number to call back for a better time to speak

## 2023-05-02 DIAGNOSIS — B34.9 VIRAL SYNDROME: ICD-10-CM

## 2023-05-02 RX ORDER — VALACYCLOVIR HYDROCHLORIDE 1 G/1
1000 TABLET, FILM COATED ORAL 2 TIMES DAILY
Qty: 14 TABLET | Refills: 3 | Status: SHIPPED | OUTPATIENT
Start: 2023-05-02 | End: 2023-05-09

## 2023-05-05 ENCOUNTER — OFFICE VISIT (OUTPATIENT)
Dept: CARDIOLOGY CLINIC | Facility: CLINIC | Age: 48
End: 2023-05-05

## 2023-05-05 VITALS
SYSTOLIC BLOOD PRESSURE: 120 MMHG | HEIGHT: 71 IN | BODY MASS INDEX: 39.06 KG/M2 | HEART RATE: 70 BPM | DIASTOLIC BLOOD PRESSURE: 80 MMHG | WEIGHT: 279 LBS

## 2023-05-05 DIAGNOSIS — G47.33 OBSTRUCTIVE SLEEP APNEA: ICD-10-CM

## 2023-05-05 DIAGNOSIS — E66.01 SEVERE OBESITY (BMI 35.0-39.9) WITH COMORBIDITY (HCC): ICD-10-CM

## 2023-05-05 DIAGNOSIS — R07.9 CHEST PAIN, UNSPECIFIED TYPE: Primary | ICD-10-CM

## 2023-05-05 DIAGNOSIS — E78.49 OTHER HYPERLIPIDEMIA: ICD-10-CM

## 2023-05-05 NOTE — PROGRESS NOTES
Cardiology Follow Up     Nikunj Abraham  [id number]  1975  [facility] [address] [address]      1  Chest pain, unspecified type        2  Severe obesity (BMI 35 0-39  9) with comorbidity ([address] )        3  Other hyperlipidemia        4  Obstructive sleep apnea            Discussion/Summary:  1  Chest pain -improved  2  Intermittent palpitations -improved  3  Hyperlipidemia  4  Obesity  5  Obstructive sleep apnea compliant with CPAP therapy    -Exercise nuclear stress test 2/2/2023 showing no perfusion defects and no evidence of ischemia with patient able to exercise for 10 minutes and 1 second with no angina during testing  -Zio patch monitor 3/8/2023 showing predominantly sinus rhythm average heart rate 73 bpm with rare supraventricular and ventricular ectopic activity but no significant runs appreciated  -As patient notes overall doing well at this time we will continue dietary and lifestyle modifications along with monitoring for home blood pressure readings and letting us know if there are any significantly elevated issues   -Patient counseled on the importance of sodium restriction to less than 1800 mg of sodium daily along with following a low-fat heart healthy diet with weight loss with goal BMI less than 30 and continued physical activity with monitoring of symptoms  -Patient also counseled on the importance of compliance with CPAP therapy of which she is very good  -We will see patient in 6 months or sooner if necessary  -We will repeat fasting lipid panel prior to next office visit   -Patient counseled if he were to have any warning or alarm type symptoms he is to seek emergency medical care immediately          History of Present Illness:  -Patient is a 51-year-old male with diverticulitis, documented history of asthma, obstructive sleep apnea compliant with CPAP therapy, hyperlipidemia, gout, chronic lymphedema along with chronic back pain with compression fractures particularly of the thoracic spine initially presented to the office in January 2023 after ER evaluation in December 2022   -Since last office visit patient denies any significant chest pain, palpitations, lightheadedness or dizziness, loss of consciousness, shortness of breath or any change in baseline mild lower extremity edema  He notes overall blood pressures are well controlled at home and while he does note there is room for improvement on dietary and lifestyle modifications he has been making steps towards a new goal with some intentional weight loss  Patient Active Problem List   Diagnosis    Asthma    Diverticulitis of colon    Gastroesophageal reflux disease without esophagitis    Severe obesity (BMI 35 0-39  9) with comorbidity ([address] )    Obstructive sleep apnea    Peripheral edema    Acute gout involving toe of right foot    Other hyperlipidemia    Acute idiopathic gout involving toe of right foot    Urinary frequency    Hematuria    Left otitis media with effusion    Conductive hearing loss of left ear    Annual physical exam    Chest pain     Past Medical History:   Diagnosis Date    Diverticulitis of colon     GERD (gastroesophageal reflux disease)     Hyperlipemia     Obesity      Social History     Socioeconomic History    Marital status: /Civil Union     Spouse name: Not on file    Number of children: Not on file    Years of education: Not on file    Highest education level: Not on file   Occupational History    Not on file   Tobacco Use    Smoking status: Some Days     Packs/day: 0 25     Types: Cigarettes, Cigars    Smokeless tobacco: Never    Tobacco comments:     (unknown if ever smoked-as per [employer])   Vaping Use    Vaping Use: Never used   Substance and Sexual Activity    Alcohol use: Yes     Comment: socially    Drug use: No    Sexual activity: Not on file   Other Topics Concern    Not on file   Social History Narrative    Not on file     Social Determinants of Health     Financial Resource Strain: Not on file   Food Insecurity: Not on file   Transportation Needs: Not on file   Physical Activity: Not on file   Stress: Not on file   Social Connections: Not on file   Intimate Partner Violence: Not on file   Housing Stability: Not on file      Family History   Problem Relation Age of Onset    Diabetes Mother     Cerebral aneurysm Mother     Stroke Father         syndrome    Stroke Maternal Grandmother         syndrome    Heart disease Paternal Grandfather     Cancer Family     Esophageal cancer Family      History reviewed  No pertinent surgical history  Current Outpatient Medications:     azelastine (ASTELIN) 0 1 % nasal spray, 1 spray into each nostril 2 (two) times a day Use in each nostril as directed, Disp: 30 mL, Rfl: 1    fluticasone (FLONASE) 50 mcg/act nasal spray, 1 spray into each nostril daily, Disp: 16 g, Rfl: 0    indomethacin (INDOCIN) 50 mg capsule, , Disp: , Rfl:     Loratadine-Pseudoephedrine (CLARITIN-D 12 HOUR PO), Take by mouth, Disp: , Rfl:     methocarbamol (Robaxin-750) 750 mg tablet, Take 1 tablet (750 mg total) by mouth every 6 (six) hours as needed for muscle spasms for up to 20 doses, Disp: 20 tablet, Rfl: 0    omeprazole (PriLOSEC) 20 mg delayed release capsule, Take 1 capsule (20 mg total) by mouth daily, Disp: 30 capsule, Rfl: 2    tamsulosin (FLOMAX) 0 4 mg, Take 1 capsule (0 4 mg total) by mouth in the morning and 1 capsule (0 4 mg total) before bedtime  Do all this for 180 doses  , Disp: 180 capsule, Rfl: 3    valACYclovir (VALTREX) 1,000 mg tablet, Take 1 tablet (1,000 mg total) by mouth 2 (two) times a day for 7 days (Patient not taking: Reported on 5/5/2023), Disp: 14 tablet, Rfl: 3    clindamycin (CLEOCIN) 150 mg capsule, , Disp: , Rfl:   Allergies   Allergen Reactions    "Azithromycin     Penicillins     Doxycycline Rash         Labs:  No visits with results within 2 Month(s) from this visit  Latest known visit with results is:   Hospital Outpatient Visit on 02/02/2023   Component Date Value    Protocol Name 02/02/2023 Fe Aguayo Time In Exercise Phase 02/02/2023 00:10:01     MAX  SYSTOLIC BP 55/51/1570 803     Max Diastolic Bp 36/04/0691 76     Max Heart Rate 02/02/2023 169     Max Predicted Heart Rate 02/02/2023 172     Reason for Termination 02/02/2023 Dyspnea     Test Indication 02/02/2023 cp     Target Hr Formular 02/02/2023 (220 - Age)*85%     Chest Pain Statement 02/02/2023 none         Imaging: No results found  Review of Systems:  Review of Systems   Constitutional: Negative for chills, diaphoresis, fatigue and fever  HENT: Negative for trouble swallowing and voice change  Eyes: Negative for pain and redness  Respiratory: Negative for shortness of breath and wheezing  Cardiovascular: Negative for chest pain, palpitations and leg swelling  Gastrointestinal: Negative for abdominal pain, constipation, diarrhea, nausea and vomiting  Genitourinary: Negative for dysuria  Musculoskeletal: Negative for neck pain and neck stiffness  Skin: Negative for rash  Neurological: Negative for dizziness, syncope, light-headedness and headaches  Psychiatric/Behavioral: Negative for agitation and confusion  All other systems reviewed and are negative  Vitals:    05/05/23 0956   BP: 120/80   Pulse: 70   Weight: 127 kg (279 lb)   Height: 5' 11\" (1 803 m)     Vitals:    05/05/23 0956   Weight: 127 kg (279 lb)     Height: 5' 11\" (180 3 cm)     Physical Exam:  Physical Exam  Vitals reviewed  Constitutional:       General: He is not in acute distress  Appearance: He is obese  He is not diaphoretic  HENT:      Head: Normocephalic and atraumatic  Eyes:      General:         Right eye: No discharge  Left eye: No discharge     Neck:      " Comments: Trachea midline, neck obese, difficult assess JVD  Cardiovascular:      Rate and Rhythm: Normal rate and regular rhythm  Heart sounds: No friction rub  Pulmonary:      Effort: Pulmonary effort is normal  No respiratory distress  Breath sounds: No wheezing  Chest:      Chest wall: No tenderness  Abdominal:      General: Bowel sounds are normal       Palpations: Abdomen is soft  Tenderness: There is no abdominal tenderness  There is no rebound  Musculoskeletal:      Right lower leg: No edema  Left lower leg: No edema  Skin:     General: Skin is warm and dry  Neurological:      Mental Status: He is alert  Comments: Awake, alert, able to answer questions appropriately, able to move extremities bilaterally     Psychiatric:         Mood and Affect: Mood normal          Behavior: Behavior normal

## 2023-05-10 DIAGNOSIS — K21.9 GASTROESOPHAGEAL REFLUX DISEASE WITHOUT ESOPHAGITIS: ICD-10-CM

## 2023-05-10 RX ORDER — OMEPRAZOLE 20 MG/1
20 CAPSULE, DELAYED RELEASE ORAL DAILY
Qty: 30 CAPSULE | Refills: 0 | Status: SHIPPED | OUTPATIENT
Start: 2023-05-10

## 2023-05-19 ENCOUNTER — APPOINTMENT (OUTPATIENT)
Dept: LAB | Facility: MEDICAL CENTER | Age: 48
End: 2023-05-19

## 2023-05-19 DIAGNOSIS — Z12.5 PROSTATE CANCER SCREENING: ICD-10-CM

## 2023-05-19 LAB — PSA SERPL-MCNC: 1.8 NG/ML (ref 0–4)

## 2023-05-25 ENCOUNTER — OFFICE VISIT (OUTPATIENT)
Dept: UROLOGY | Facility: CLINIC | Age: 48
End: 2023-05-25

## 2023-05-25 VITALS
DIASTOLIC BLOOD PRESSURE: 68 MMHG | WEIGHT: 283 LBS | SYSTOLIC BLOOD PRESSURE: 102 MMHG | HEART RATE: 64 BPM | BODY MASS INDEX: 39.62 KG/M2 | HEIGHT: 71 IN

## 2023-05-25 DIAGNOSIS — N40.1 BPH WITH OBSTRUCTION/LOWER URINARY TRACT SYMPTOMS: Primary | ICD-10-CM

## 2023-05-25 DIAGNOSIS — R35.0 URINARY FREQUENCY: ICD-10-CM

## 2023-05-25 DIAGNOSIS — Z30.2 ENCOUNTER FOR STERILIZATION: ICD-10-CM

## 2023-05-25 DIAGNOSIS — Z12.5 PROSTATE CANCER SCREENING: ICD-10-CM

## 2023-05-25 DIAGNOSIS — N13.8 BPH WITH OBSTRUCTION/LOWER URINARY TRACT SYMPTOMS: Primary | ICD-10-CM

## 2023-05-25 LAB — POST-VOID RESIDUAL VOLUME, ML POC: 40 ML

## 2023-05-25 RX ORDER — TAMSULOSIN HYDROCHLORIDE 0.4 MG/1
0.4 CAPSULE ORAL 2 TIMES DAILY
Qty: 180 CAPSULE | Refills: 3 | Status: SHIPPED | OUTPATIENT
Start: 2023-05-25

## 2023-05-25 NOTE — PROGRESS NOTES
5/25/2023    No chief complaint on file  Assessment and Plan    50 y o  male manage by Dr Deborah Nesbitt    1  BPH with obstruction  · Symptoms remain stable and manageable with Flomax 0 4 mg twice daily  I again discussed options of continuing medical therapy versus surgical therapy with him today  As he is emptying his bladder well with PVR 44 mL and his symptoms are manageable he would like to continue with medical therapy for now  I will see him back in 1 year with a renal ultrasound PVR prior to that visit  2   Prostate cancer screening  · Negative family history of prostate cancer  · PSA trend  · 1 8 (5/19/2023)  · 1 6 (5/12/2022)  · 1 3 (8/27/2020)  · MOI reveals a smooth symmetric prostate, no palpable nodules or masses  Estimated gland size between 35 to 40 g  · Repeat PSA in 1 year    3  Encounter for sterilization  · Prior to Jared he had been scheduled for a vasectomy  He is interested in pursuing this  · I had a brief discussion regarding the vasectomy procedure today  On physical exam the right testicle does sit slightly higher than the left  I was able to easily palpate the left vas deferens however had some difficulty palpating the right vas deferens  For this reason we will schedule him for a consultation with either Dr Elisa Hodgkin or Dr Manny Tracey for formal vasectomy consultation  Subjective:    He presents today reporting overall doing well since last time we saw him  He is seen a mild improvement in his BPH symptoms since increasing his Flomax to 0 4 mg twice daily  He will on occasion experience frequency and urgency however feels as though he is emptying his bladder completely  He does notice that if he misses a dose his symptoms do become significantly more noticeable  He denies any issues with nocturia  His PVR today was 44 mL  He denies any episodes of gross hematuria        History of Present Illness  Marquis Harrell is a 50 y o  male here for annual "follow-up evaluation of BPH and microscopic hematuria  Patient Dr Zenobia Fajardo and was last seen on 5/24/2022 for office cystoscopy evaluation of BPH and microscopic hematuria  He was found to have a trilobar hypertrophy and very large median lobe that is highly obstructive  Dr Paras Wu discussed medical therapy versus surgical therapy  He also discussed addition of finasteride  Patient elected to continue with medical therapy and increased his Flomax to twice per day  Patient also was recommended to have a renal ultrasound with PVR for further evaluation of his microscopic hematuria  This is not been completed  He did however have a CT chest abdomen pelvis with and without contrast on 12/4/2022 which showed unremarkable kidneys, ureters, and bladder  There was mildly enlarged prostate noted  Prostate cancer screening: PSA 1 8 as of 5/19/2023 previously 1 6 on 5/12/2022 and 1 3 on 8/27/2020  Review of Systems   Constitutional: Negative for chills and fever  HENT: Negative for congestion and sore throat  Respiratory: Negative for cough and shortness of breath  Cardiovascular: Negative for chest pain and leg swelling  Gastrointestinal: Negative for abdominal pain, constipation, diarrhea, nausea and vomiting  Genitourinary: Positive for frequency and urgency  Negative for difficulty urinating, dysuria, flank pain and hematuria  Musculoskeletal: Negative for back pain and gait problem  Skin: Negative for wound  Allergic/Immunologic: Negative for immunocompromised state  Neurological: Negative for dizziness, weakness and numbness  Hematological: Does not bruise/bleed easily  Vitals  Vitals:    05/25/23 0948   BP: 102/68   BP Location: Right arm   Patient Position: Sitting   Cuff Size: Large   Pulse: 64   Weight: 128 kg (283 lb)   Height: 5' 11\" (1 803 m)       Physical Exam  Vitals reviewed  Constitutional:       General: He is not in acute distress       Appearance: " Normal appearance  He is not ill-appearing or toxic-appearing  HENT:      Head: Normocephalic and atraumatic  Eyes:      General: No scleral icterus  Conjunctiva/sclera: Conjunctivae normal    Cardiovascular:      Rate and Rhythm: Normal rate  Pulmonary:      Effort: Pulmonary effort is normal  No respiratory distress  Abdominal:      Tenderness: There is no right CVA tenderness or left CVA tenderness  Hernia: No hernia is present  Genitourinary:     Comments: Normal circumcised phallus, the right testicle sits slightly higher than the left  I was able to easily palpate the left vas deferens however had some difficulty palpating the right vas deferens  Patient tolerated the exam well  MOI reveals a smooth symmetric prostate, no palpable nodules or masses  Musculoskeletal:      Cervical back: Normal range of motion  Right lower leg: No edema  Left lower leg: No edema  Skin:     General: Skin is warm and dry  Coloration: Skin is not jaundiced or pale  Neurological:      General: No focal deficit present  Mental Status: He is alert and oriented to person, place, and time  Mental status is at baseline  Gait: Gait normal    Psychiatric:         Mood and Affect: Mood normal          Behavior: Behavior normal          Thought Content:  Thought content normal          Judgment: Judgment normal          Past History  Past Medical History:   Diagnosis Date   • Diverticulitis of colon    • GERD (gastroesophageal reflux disease)    • Hyperlipemia    • Obesity      Social History     Socioeconomic History   • Marital status: /Civil Union     Spouse name: None   • Number of children: None   • Years of education: None   • Highest education level: None   Occupational History   • None   Tobacco Use   • Smoking status: Some Days     Types: Cigars   • Smokeless tobacco: Never   • Tobacco comments:     (unknown if ever smoked-as per [employer])   Vaping Use   • Vaping Use: Never used   Substance and Sexual Activity   • Alcohol use: Yes     Comment: socially   • Drug use: No   • Sexual activity: None   Other Topics Concern   • None   Social History Narrative   • None     Social Determinants of Health     Financial Resource Strain: Not on file   Food Insecurity: Not on file   Transportation Needs: Not on file   Physical Activity: Not on file   Stress: Not on file   Social Connections: Not on file   Intimate Partner Violence: Not on file   Housing Stability: Not on file     Social History     Tobacco Use   Smoking Status Some Days   • Types: Cigars   Smokeless Tobacco Never   Tobacco Comments    (unknown if ever smoked-as per [employer])     Family History   Problem Relation Age of Onset   • Diabetes Mother    • Cerebral aneurysm Mother    • Stroke Father         syndrome   • Stroke Maternal Grandmother         syndrome   • Heart disease Paternal Grandfather    • Cancer Family    • Esophageal cancer Family        The following portions of the patient's history were reviewed and updated as appropriate allergies, current medications, past medical history, past social history, past surgical history and problem list    [email or web address]  CTA - CHEST, ABDOMEN AND PELVIS - WITHOUT AND WITH IV CONTRAST     INDICATION: 51-year-old male with intermittent chest pain between shoulder blades          COMPARISON:  None      TECHNIQUE: CT examination of the chest, abdomen and pelvis was performed both prior to and after the administration of intravenous contrast   The noncontrast portion of this examination was performed utilizing low radiation dose technique  Thin section   angiographic arterial phase post contrast technique was used in order to evaluate for aortic dissection  3D reformatted images and volume rendering were performed on an independent workstation    Additionally, axial, sagittal, and coronal 2D reformatted   images were created from the source data and submitted for interpretation      Radiation dose length product (DLP) for this visit:  2078 02 mGy-cm   This examination, like all CT scans performed in the [facility], was performed utilizing techniques to minimize radiation dose exposure, including the use of   iterative reconstruction and automated exposure control      IV Contrast:  100 mL of iohexol (OMNIPAQUE)     Enteric Contrast:  Enteric contrast was not administered      FINDINGS:      AORTA:  There is no aortic dissection or intramural hematoma  There is no aortic aneurysm         CHEST     LUNGS:  Lungs are clear  There is no tracheal or endobronchial lesion  A 0 9 cm left lower lobe pulmonary cyst      PLEURA:  Unremarkable      HEART/PULMONARY ARTERIAL TREE:  Unremarkable for patient's age      MEDIASTINUM AND DANIELLE:  Unremarkable      CHEST WALL AND LOWER NECK:   Mild bilateral gynecomastia      ABDOMEN     LIVER/BILIARY TREE:  Unremarkable      GALLBLADDER:  No calcified gallstones  No pericholecystic inflammatory change      SPLEEN:  Unremarkable      PANCREAS:  Fatty infiltration of the pancreas      ADRENAL GLANDS:  Unremarkable      KIDNEYS/URETERS:  Unremarkable  No hydronephrosis      STOMACH AND BOWEL:  There is colonic diverticulosis without evidence of acute diverticulitis      APPENDIX:  A normal appendix was visualized      ABDOMINOPELVIC CAVITY:  No ascites or free intraperitoneal air  No lymphadenopathy      PELVIS     REPRODUCTIVE ORGANS:  Mildly enlarged prostate      URINARY BLADDER:  Unremarkable      ABDOMINAL WALL/INGUINAL REGIONS:  Small bilateral fat-containing inguinal hernias  Small fat-containing umbilical hernia      OSSEOUS STRUCTURES:  No acute fracture or destructive osseous lesion  Right L5 pars defect      IMPRESSION:     1  No aortic dissection, intramural hematoma, or aneurysm      2   Multiple incidental findings, as described above                  Results  Recent Results (from the past 1 hour(s))   POCT Measure PVR    Collection Time: 05/25/23  9:52 AM   Result Value Ref Range    POST-VOID RESIDUAL VOLUME, ML POC 40 mL   ]  Lab Results   Component Value Date    PSA 1 80 05/19/2023    PSA 1 6 05/12/2022    PSA 1 3 08/27/2020    PSA 1 4 08/28/2019     Lab Results   Component Value Date    BUN 20 12/08/2022    CALCIUM 9 5 12/08/2022     12/08/2022    CO2 28 12/08/2022    CREATININE 1 02 12/08/2022    K 4 2 12/08/2022     Lab Results   Component Value Date    HCT 44 6 12/04/2022    HGB 15 0 12/04/2022    MCV 85 12/04/2022     12/04/2022    WBC 10 40 (H) 12/04/2022       Please Note:  Voice dictation software has been used to create this document  There may be inadvertent transcriptions errors       MADISYN Macedo 05/25/23

## 2023-06-26 DIAGNOSIS — K21.9 GASTROESOPHAGEAL REFLUX DISEASE WITHOUT ESOPHAGITIS: ICD-10-CM

## 2023-06-26 RX ORDER — OMEPRAZOLE 20 MG/1
20 CAPSULE, DELAYED RELEASE ORAL DAILY
Qty: 90 CAPSULE | Refills: 0 | Status: SHIPPED | OUTPATIENT
Start: 2023-06-26

## 2023-07-05 ENCOUNTER — CONSULT (OUTPATIENT)
Dept: UROLOGY | Facility: CLINIC | Age: 48
End: 2023-07-05
Payer: COMMERCIAL

## 2023-07-05 VITALS
WEIGHT: 286.6 LBS | TEMPERATURE: 98.2 F | HEIGHT: 71 IN | BODY MASS INDEX: 40.12 KG/M2 | OXYGEN SATURATION: 96 % | HEART RATE: 84 BPM | DIASTOLIC BLOOD PRESSURE: 88 MMHG | SYSTOLIC BLOOD PRESSURE: 136 MMHG

## 2023-07-05 DIAGNOSIS — Z30.2 ENCOUNTER FOR STERILIZATION: Primary | ICD-10-CM

## 2023-07-05 PROCEDURE — 99213 OFFICE O/P EST LOW 20 MIN: CPT | Performed by: UROLOGY

## 2023-07-05 RX ORDER — LORAZEPAM 1 MG/1
1 TABLET ORAL ONCE
Qty: 1 TABLET | Refills: 0 | Status: SHIPPED | OUTPATIENT
Start: 2023-07-05 | End: 2023-07-05

## 2023-07-05 NOTE — PROGRESS NOTES
UROLOGY PROGRESS NOTE         NAME: Gil Chaudhry  AGE: 50 y.o. SEX: male  : 1975   MRN: [id number]    DATE: 2023  TIME: 12:06 PM    Assessment and Plan      Impression:   1. Encounter for sterilization  -     LORazepam (ATIVAN) 1 mg tablet; Take 1 tablet (1 mg total) by mouth 1 (one) time for 1 dose Take 1 hour prior to procedure    Pros and cons options discussed.  Plan: We will proceed with a vasectomy at his discretion. Specimen cups instructions given. Ativan called in      Chief Complaint     Chief Complaint   Patient presents with   • Vasectomy     Here for vas, consult. Had previous consult in  [facility]. But sx was never done. History of Present Illness     HPI: Fareed Cruz is a 50y.o. year old male who presents with he is interested in vasectomy. Has 3child 6years old. His wife is high risk. Pros and cons of the procedure have been discussed. His wife is 43years old. Currently not pregnant. The following portions of the patient's history were reviewed and updated as appropriate: allergies, current medications, past family history, past medical history, past social history, past surgical history and problem list.  Past Medical History:   Diagnosis Date   • Diverticulitis of colon    • GERD (gastroesophageal reflux disease)    • Hyperlipemia    • Obesity      History reviewed. No pertinent surgical history. shoulder  Review of Systems     Const: Denies chills, fever and weight loss. CV: Denies chest pain. Resp: Denies SOB. GI: Denies abdominal pain, nausea and vomiting. : Denies symptoms other than stated above. Musculo: Denies back pain. Objective   /88   Pulse 84   Temp 98.2 °F (36.8 °C)   Ht 5' 11" (1.803 m)   Wt 130 kg (286 lb 9.6 oz)   SpO2 96%   BMI 39.97 kg/m²     Physical Exam  Const: Appears healthy and well developed. No signs of acute distress present. Resp: Respirations are regular and unlabored.    CV: Rate is regular. Rhythm is regular. Abdomen: Abdomen is soft, nontender, and nondistended. Kidneys are not palpable. : Both vasa easy to palpate. Testicles normal.  No hernias  Psych: Patient's attitude is cooperative.  Mood is normal. Affect is normal.    Current Medications     Current Outpatient Medications:   •  azelastine (ASTELIN) 0.1 % nasal spray, 1 spray into each nostril 2 (two) times a day Use in each nostril as directed, Disp: 30 mL, Rfl: 1  •  fluticasone (FLONASE) 50 mcg/act nasal spray, 1 spray into each nostril daily, Disp: 16 g, Rfl: 0  •  indomethacin (INDOCIN) 50 mg capsule, , Disp: , Rfl:   •  Loratadine-Pseudoephedrine (CLARITIN-D 12 HOUR PO), Take by mouth, Disp: , Rfl:   •  LORazepam (ATIVAN) 1 mg tablet, Take 1 tablet (1 mg total) by mouth 1 (one) time for 1 dose Take 1 hour prior to procedure, Disp: 1 tablet, Rfl: 0  •  omeprazole (PriLOSEC) 20 mg delayed release capsule, Take 1 capsule (20 mg total) by mouth daily, Disp: 90 capsule, Rfl: 0  •  tamsulosin (FLOMAX) 0.4 mg, Take 1 capsule (0.4 mg total) by mouth 2 (two) times a day, Disp: 180 capsule, Rfl: 3  •  valACYclovir (VALTREX) 1,000 mg tablet, Take 1 tablet (1,000 mg total) by mouth 2 (two) times a day for 7 days (Patient not taking: Reported on 5/5/2023), Disp: 14 tablet, Rfl: 3  •  methocarbamol (Robaxin-750) 750 mg tablet, Take 1 tablet (750 mg total) by mouth every 6 (six) hours as needed for muscle spasms for up to 20 doses (Patient not taking: Reported on 7/5/2023), Disp: 20 tablet, Rfl: 0        Jaime Kumar MD

## 2023-07-17 ENCOUNTER — TELEPHONE (OUTPATIENT)
Dept: FAMILY MEDICINE CLINIC | Facility: CLINIC | Age: 48
End: 2023-07-17

## 2023-07-17 DIAGNOSIS — J40 BRONCHITIS: Primary | ICD-10-CM

## 2023-07-17 RX ORDER — LEVOFLOXACIN 500 MG/1
500 TABLET, FILM COATED ORAL EVERY 24 HOURS
Qty: 7 TABLET | Refills: 0 | Status: SHIPPED | OUTPATIENT
Start: 2023-07-17 | End: 2023-07-24

## 2023-07-17 NOTE — TELEPHONE ENCOUNTER
He has a cough and is  bringing up yellow mucous. covid test negative. [facility].   Usually gets levaquin

## 2023-08-08 DIAGNOSIS — Z30.2 ENCOUNTER FOR STERILIZATION: ICD-10-CM

## 2023-08-10 RX ORDER — LORAZEPAM 1 MG/1
1 TABLET ORAL ONCE
Qty: 1 TABLET | Refills: 0 | Status: SHIPPED | OUTPATIENT
Start: 2023-08-10 | End: 2023-08-10

## 2023-10-02 DIAGNOSIS — K21.9 GASTROESOPHAGEAL REFLUX DISEASE WITHOUT ESOPHAGITIS: ICD-10-CM

## 2023-10-02 RX ORDER — OMEPRAZOLE 20 MG/1
20 CAPSULE, DELAYED RELEASE ORAL DAILY
Qty: 90 CAPSULE | Refills: 0 | Status: SHIPPED | OUTPATIENT
Start: 2023-10-02

## 2023-10-04 ENCOUNTER — OFFICE VISIT (OUTPATIENT)
Dept: FAMILY MEDICINE CLINIC | Facility: CLINIC | Age: 48
End: 2023-10-04
Payer: COMMERCIAL

## 2023-10-04 VITALS
DIASTOLIC BLOOD PRESSURE: 70 MMHG | SYSTOLIC BLOOD PRESSURE: 124 MMHG | HEART RATE: 72 BPM | BODY MASS INDEX: 40.6 KG/M2 | HEIGHT: 71 IN | TEMPERATURE: 98.4 F | RESPIRATION RATE: 18 BRPM | WEIGHT: 290 LBS

## 2023-10-04 DIAGNOSIS — L42 PITYRIASIS ROSEA: Primary | ICD-10-CM

## 2023-10-04 DIAGNOSIS — R35.0 URINARY FREQUENCY: ICD-10-CM

## 2023-10-04 PROCEDURE — 99213 OFFICE O/P EST LOW 20 MIN: CPT | Performed by: INTERNAL MEDICINE

## 2023-10-04 RX ORDER — TAMSULOSIN HYDROCHLORIDE 0.4 MG/1
0.4 CAPSULE ORAL 2 TIMES DAILY
Qty: 180 CAPSULE | Refills: 3 | Status: SHIPPED | OUTPATIENT
Start: 2023-10-04

## 2023-10-04 RX ORDER — CLOBETASOL PROPIONATE 0.5 MG/G
CREAM TOPICAL 2 TIMES DAILY
Qty: 30 G | Refills: 3 | Status: SHIPPED | OUTPATIENT
Start: 2023-10-04

## 2023-10-04 RX ORDER — PREDNISONE 20 MG/1
20 TABLET ORAL DAILY
Qty: 5 TABLET | Refills: 0 | Status: SHIPPED | OUTPATIENT
Start: 2023-10-04

## 2023-10-04 NOTE — PROGRESS NOTES
Name: Bar Cruz      : 1975      MRN: [id number]  Encounter Provider: Nancy Mcadams DO  Encounter Date: 10/4/2023   Encounter department: [address]     1. Pityriasis rosea  -     clobetasol (TEMOVATE) 0.05 % cream; Apply topically 2 (two) times a day  -     predniSONE 20 mg tablet; Take 1 tablet (20 mg total) by mouth daily  Moisturize daily and recommend derm evaluation to confirm     2. Urinary frequency  -     tamsulosin (FLOMAX) 0.4 mg; Take 1 capsule (0.4 mg total) by mouth 2 (two) times a day      BMI Counseling: Body mass index is 40.45 kg/m². The BMI is above normal. Nutrition recommendations include consuming healthier snacks and increasing intake of lean protein. Exercise recommendations include exercising 3-5 times per week. Rationale for BMI follow-up plan is due to patient being overweight or obese. Depression Screening and Follow-up Plan: Patient was screened for depression during today's encounter. They screened negative with a PHQ-2 score of 0. Setup annual visit  Subjective      HPI   Pt has rash not itchy on lower abdomen and flank No drainage no blisters No pain Tried otc fungal and steroid cream Some relief with steroid cream - faded but did not resolve No abdominal pain No prior hx of same No change in bowels   Review of Systems   Constitutional: Negative for chills and fever. Respiratory: Negative for cough and shortness of breath. Cardiovascular: Negative for chest pain. Gastrointestinal: Negative for abdominal distention and abdominal pain. Skin: Positive for rash. Neurological: Negative for dizziness, light-headedness and headaches. Psychiatric/Behavioral: Negative for sleep disturbance. The patient is not nervous/anxious.     irregular plaques on abdomen and flank area dry scaly     Current Outpatient Medications on File Prior to Visit   Medication Sig   • azelastine (ASTELIN) 0.1 % nasal spray 1 spray into each nostril 2 (two) times a day Use in each nostril as directed   • fluticasone (FLONASE) 50 mcg/act nasal spray 1 spray into each nostril daily   • indomethacin (INDOCIN) 50 mg capsule    • Loratadine-Pseudoephedrine (CLARITIN-D 12 HOUR PO) Take by mouth   • omeprazole (PriLOSEC) 20 mg delayed release capsule Take 1 capsule (20 mg total) by mouth daily   • [DISCONTINUED] tamsulosin (FLOMAX) 0.4 mg Take 1 capsule (0.4 mg total) by mouth 2 (two) times a day   • [DISCONTINUED] LORazepam (ATIVAN) 1 mg tablet Take 1 tablet (1 mg total) by mouth 1 (one) time for 1 dose Take 1 hour prior to procedure (Patient not taking: Reported on 10/4/2023)   • [DISCONTINUED] methocarbamol (Robaxin-750) 750 mg tablet Take 1 tablet (750 mg total) by mouth every 6 (six) hours as needed for muscle spasms for up to 20 doses (Patient not taking: Reported on 7/5/2023)   • [DISCONTINUED] valACYclovir (VALTREX) 1,000 mg tablet Take 1 tablet (1,000 mg total) by mouth 2 (two) times a day for 7 days (Patient not taking: Reported on 5/5/2023)       Objective     /70   Pulse 72   Temp 98.4 °F (36.9 °C) (Temporal)   Resp 18   Ht 5' 11" (1.803 m)   Wt 132 kg (290 lb)   BMI 40.45 kg/m²     Physical Exam  Vitals reviewed. Constitutional:       General: He is not in acute distress. Appearance: Normal appearance. He is not toxic-appearing. Cardiovascular:      Rate and Rhythm: Normal rate. Pulmonary:      Effort: Pulmonary effort is normal.   Abdominal:      General: There is no distension. Tenderness: There is no abdominal tenderness. Musculoskeletal:      Right lower leg: No edema. Left lower leg: No edema. Skin:     Findings: Lesion and rash present. No erythema. Comments: Rash across lower abdomen and flank area dry scaly no open areas    Neurological:      General: No focal deficit present. Mental Status: He is alert and oriented to person, place, and time. Mental status is at baseline.    Psychiatric: Mood and Affect: Mood normal.       [place] Lute, DO

## 2024-01-01 DIAGNOSIS — K21.9 GASTROESOPHAGEAL REFLUX DISEASE WITHOUT ESOPHAGITIS: ICD-10-CM

## 2024-01-02 RX ORDER — OMEPRAZOLE 20 MG/1
CAPSULE, DELAYED RELEASE ORAL
Qty: 90 CAPSULE | Refills: 3 | Status: SHIPPED | OUTPATIENT
Start: 2024-01-02

## 2024-01-23 ENCOUNTER — OFFICE VISIT (OUTPATIENT)
Dept: FAMILY MEDICINE CLINIC | Facility: CLINIC | Age: 49
End: 2024-01-23
Payer: COMMERCIAL

## 2024-01-23 VITALS
RESPIRATION RATE: 18 BRPM | HEIGHT: 71 IN | HEART RATE: 82 BPM | TEMPERATURE: 97.2 F | BODY MASS INDEX: 41.02 KG/M2 | OXYGEN SATURATION: 98 % | DIASTOLIC BLOOD PRESSURE: 78 MMHG | SYSTOLIC BLOOD PRESSURE: 144 MMHG | WEIGHT: 293 LBS

## 2024-01-23 DIAGNOSIS — R07.2 PRECORDIAL PAIN: ICD-10-CM

## 2024-01-23 DIAGNOSIS — G47.33 OBSTRUCTIVE SLEEP APNEA: ICD-10-CM

## 2024-01-23 DIAGNOSIS — K21.9 GASTROESOPHAGEAL REFLUX DISEASE WITHOUT ESOPHAGITIS: ICD-10-CM

## 2024-01-23 DIAGNOSIS — Z00.00 ANNUAL PHYSICAL EXAM: Primary | ICD-10-CM

## 2024-01-23 DIAGNOSIS — L42 PITYRIASIS ROSEA: ICD-10-CM

## 2024-01-23 DIAGNOSIS — R35.0 URINARY FREQUENCY: ICD-10-CM

## 2024-01-23 PROBLEM — R31.9 HEMATURIA: Status: RESOLVED | Noted: 2019-08-15 | Resolved: 2024-01-23

## 2024-01-23 PROBLEM — H65.92 LEFT OTITIS MEDIA WITH EFFUSION: Status: RESOLVED | Noted: 2019-09-04 | Resolved: 2024-01-23

## 2024-01-23 PROBLEM — Z30.2 ENCOUNTER FOR STERILIZATION: Status: RESOLVED | Noted: 2020-06-10 | Resolved: 2024-01-23

## 2024-01-23 PROCEDURE — 99396 PREV VISIT EST AGE 40-64: CPT | Performed by: INTERNAL MEDICINE

## 2024-01-23 RX ORDER — OMEPRAZOLE 20 MG/1
20 CAPSULE, DELAYED RELEASE ORAL DAILY
Qty: 90 CAPSULE | Refills: 3 | Status: SHIPPED | OUTPATIENT
Start: 2024-01-23

## 2024-01-23 RX ORDER — CLOBETASOL PROPIONATE 0.5 MG/G
CREAM TOPICAL 2 TIMES DAILY
Qty: 30 G | Refills: 3 | Status: SHIPPED | OUTPATIENT
Start: 2024-01-23

## 2024-01-23 RX ORDER — TAMSULOSIN HYDROCHLORIDE 0.4 MG/1
0.4 CAPSULE ORAL 2 TIMES DAILY
Qty: 180 CAPSULE | Refills: 3 | Status: SHIPPED | OUTPATIENT
Start: 2024-01-23

## 2024-01-23 NOTE — PROGRESS NOTES
Name: Gil Chaudhry      : 1975      MRN: [id number]  Encounter Provider: Sandra Ramsay DO  Encounter Date: 2024   Encounter department: [facility]    Assessment & Plan     1. Precordial pain    2. Urinary frequency  -     tamsulosin (FLOMAX) 0.4 mg; Take 1 capsule (0.4 mg total) by mouth 2 (two) times a day    3. Gastroesophageal reflux disease without esophagitis  -     omeprazole (PriLOSEC) 20 mg delayed release capsule; Take 1 capsule (20 mg total) by mouth daily    4. Pityriasis rosea  -     clobetasol (TEMOVATE) 0.05 % cream; Apply topically 2 (two) times a day    5. Obstructive sleep apnea        Tobacco Cessation Counseling: Tobacco cessation counseling was provided. The patient is sincerely urged to quit consumption of tobacco. He is not ready to quit tobacco.         Subjective      HPI  Review of Systems   Constitutional:  Negative for chills and fever.   HENT: Negative.     Eyes:  Negative for visual disturbance.   Respiratory:  Negative for cough and shortness of breath.    Cardiovascular:  Negative for chest pain, palpitations and leg swelling.   Gastrointestinal:  Negative for abdominal distention and abdominal pain.   Genitourinary: Negative.    Musculoskeletal: Negative.    Neurological:  Negative for dizziness, light-headedness and headaches.   Psychiatric/Behavioral:  Negative for sleep disturbance. The patient is not nervous/anxious.        Current Outpatient Medications on File Prior to Visit   Medication Sig    azelastine (ASTELIN) 0.1 % nasal spray 1 spray into each nostril 2 (two) times a day Use in each nostril as directed    fluticasone (FLONASE) 50 mcg/act nasal spray 1 spray into each nostril daily    indomethacin (INDOCIN) 50 mg capsule     Loratadine-Pseudoephedrine (CLARITIN-D 12 HOUR PO) Take by mouth    [DISCONTINUED] clobetasol (TEMOVATE) 0.05 % cream Apply topically 2 (two) times a day    [DISCONTINUED] omeprazole (PriLOSEC) 20 mg delayed release  "capsule TAKE ONE (1) CAPSULE (20 MG TOTAL) BY MOUTH DAILY    [DISCONTINUED] tamsulosin (FLOMAX) 0.4 mg Take 1 capsule (0.4 mg total) by mouth 2 (two) times a day    [DISCONTINUED] predniSONE 20 mg tablet Take 1 tablet (20 mg total) by mouth daily (Patient not taking: Reported on 1/23/2024)       Objective     /78   Pulse 82   Temp (!) 97.2 °F (36.2 °C) (Temporal)   Resp 18   Ht 5' 11\" (1.803 m)   Wt 133 kg (293 lb)   SpO2 98%   BMI 40.87 kg/m²     Physical Exam  Vitals and nursing note reviewed.   Constitutional:       General: He is not in acute distress.     Appearance: Normal appearance. He is not ill-appearing, toxic-appearing or diaphoretic.   HENT:      Head: Normocephalic and atraumatic.      Right Ear: External ear normal.      Left Ear: External ear normal.      Nose: Nose normal.      Mouth/Throat:      Mouth: Mucous membranes are moist.   Eyes:      General: No scleral icterus.  Cardiovascular:      Rate and Rhythm: Normal rate and regular rhythm.      Pulses: Normal pulses.   Pulmonary:      Effort: Pulmonary effort is normal. No respiratory distress.      Breath sounds: Normal breath sounds. No wheezing.   Abdominal:      General: Bowel sounds are normal.      Palpations: Abdomen is soft.   Musculoskeletal:         General: Normal range of motion.      Cervical back: Neck supple. No rigidity.      Right lower leg: No edema.      Left lower leg: No edema.   Lymphadenopathy:      Cervical: No cervical adenopathy.   Skin:     General: Skin is warm and dry.      Coloration: Skin is not jaundiced or pale.   Neurological:      General: No focal deficit present.      Mental Status: He is alert and oriented to person, place, and time. Mental status is at baseline.   Psychiatric:         Mood and Affect: Mood normal.         Behavior: Behavior normal.         Thought Content: Thought content normal.         Judgment: Judgment normal.       Sandra Ramsay DO    "

## 2024-01-23 NOTE — PROGRESS NOTES
ADULT ANNUAL PHYSICAL  [facility] PRIMARY CARE    NAME: Gil Chaudhry  AGE: 49 y.o. SEX: male  : 1975     DATE: 2024     Assessment and Plan:     Problem List Items Addressed This Visit          Digestive    Gastroesophageal reflux disease without esophagitis    Relevant Medications    omeprazole (PriLOSEC) 20 mg delayed release capsule       Respiratory    Obstructive sleep apnea       Musculoskeletal and Integument    Pityriasis rosea    Relevant Medications    clobetasol (TEMOVATE) 0.05 % cream       Other    Urinary frequency    Relevant Medications    tamsulosin (FLOMAX) 0.4 mg    Chest pain     Other Visit Diagnoses       Annual physical exam    -  Primary        Pt to call and reschedule cardio eval  Monitor Brayan and my chart readings as given recent events low threshold to start medication  Discussed lifestyle changes and pt does agree needs to be more proactive  Stay hydrated Limit caffeine  F/u 4 months    Immunizations and preventive care screenings were discussed with patient today. Appropriate education was printed on patient's after visit summary.        Counseling:  Exercise: the importance of regular exercise/physical activity was discussed. Recommend exercise 3-5 times per week for at least 30 minutes.       Tobacco Cessation Counseling: Tobacco cessation counseling was provided. The patient is sincerely urged to quit consumption of tobacco. He is not ready to quit tobacco. Cigar intermittingly         Return in about 4 months (around 2024), or if symptoms worsen or fail to improve.     Chief Complaint:     Chief Complaint   Patient presents with    Physical Exam      History of Present Illness:     Adult Annual Physical   Patient here for a comprehensive physical exam. The patient reports problems - Pt Mom passed from hypertensive bleed yesterday Pt has not recently been checking Bp but no acute sxs other than loss of several family members  in past few weeks  .    Diet and Physical Activity  Diet/Nutrition: limited junk food.   Exercise: no formal exercise.      Depression Screening  PHQ-2/9 Depression Screening           General Health  Sleep: gets 4-6 hours of sleep on average.   Hearing: normal - bilateral.  Vision: no vision problems.   Dental: regular dental visits.        Health  Symptoms include: none    Advanced Care Planning  Do you have an advanced directive? no  Do you have a durable medical power of ? no     Review of Systems:     Review of Systems   Constitutional: Negative.    HENT: Negative.     Eyes:  Negative for visual disturbance.   Respiratory: Negative.     Cardiovascular: Negative.  Negative for chest pain and palpitations.   Gastrointestinal: Negative.    Genitourinary: Negative.    Musculoskeletal: Negative.    Neurological:  Negative for dizziness, light-headedness and headaches.   Psychiatric/Behavioral: Negative.        Past Medical History:     Past Medical History:   Diagnosis Date    Diverticulitis of colon     GERD (gastroesophageal reflux disease)     Hyperlipemia     Left otitis media with effusion     Obesity       Past Surgical History:     History reviewed. No pertinent surgical history.   Family History:     Family History   Problem Relation Age of Onset    Diabetes Mother     Cerebral aneurysm Mother     Stroke Father         syndrome    Stroke Maternal Grandmother         syndrome    Heart disease Paternal Grandfather     Cancer Family     Esophageal cancer Family       Social History:     Social History     Socioeconomic History    Marital status: /Civil Union     Spouse name: None    Number of children: None    Years of education: None    Highest education level: None   Occupational History    None   Tobacco Use    Smoking status: Some Days     Types: Cigars    Smokeless tobacco: Never    Tobacco comments:     (unknown if ever smoked-as per [employer])   Vaping Use    Vaping status: Never Used  "  Substance and Sexual Activity    Alcohol use: Yes     Comment: socially    Drug use: No    Sexual activity: Yes   Other Topics Concern    None   Social History Narrative    None     Social Determinants of Health     Financial Resource Strain: Not on file   Food Insecurity: Not on file   Transportation Needs: Not on file   Physical Activity: Not on file   Stress: Not on file   Social Connections: Not on file   Intimate Partner Violence: Not on file   Housing Stability: Not on file      Current Medications:     Current Outpatient Medications   Medication Sig Dispense Refill    azelastine (ASTELIN) 0.1 % nasal spray 1 spray into each nostril 2 (two) times a day Use in each nostril as directed 30 mL 1    clobetasol (TEMOVATE) 0.05 % cream Apply topically 2 (two) times a day 30 g 3    fluticasone (FLONASE) 50 mcg/act nasal spray 1 spray into each nostril daily 16 g 0    indomethacin (INDOCIN) 50 mg capsule       Loratadine-Pseudoephedrine (CLARITIN-D 12 HOUR PO) Take by mouth      omeprazole (PriLOSEC) 20 mg delayed release capsule Take 1 capsule (20 mg total) by mouth daily 90 capsule 3    tamsulosin (FLOMAX) 0.4 mg Take 1 capsule (0.4 mg total) by mouth 2 (two) times a day 180 capsule 3     No current facility-administered medications for this visit.      Allergies:     Allergies   Allergen Reactions    Azithromycin Rash    Doxycycline Rash    Penicillins Rash      Physical Exam:     /78   Pulse 82   Temp (!) 97.2 °F (36.2 °C) (Temporal)   Resp 18   Ht 5' 11\" (1.803 m)   Wt 133 kg (293 lb)   SpO2 98%   BMI 40.87 kg/m²     Physical Exam  Vitals and nursing note reviewed.   Constitutional:       General: He is not in acute distress.     Appearance: Normal appearance. He is not ill-appearing, toxic-appearing or diaphoretic.   HENT:      Head: Normocephalic and atraumatic.      Right Ear: External ear normal.      Left Ear: External ear normal.      Nose: Nose normal.      Mouth/Throat:      Mouth: Mucous " membranes are moist.   Eyes:      General: No scleral icterus.  Cardiovascular:      Rate and Rhythm: Normal rate and regular rhythm.      Pulses: Normal pulses.      Heart sounds: Normal heart sounds.   Pulmonary:      Effort: Pulmonary effort is normal. No respiratory distress.      Breath sounds: Normal breath sounds. No wheezing.   Abdominal:      General: Bowel sounds are normal. There is no distension.      Palpations: Abdomen is soft.      Tenderness: There is no abdominal tenderness.   Musculoskeletal:      Cervical back: Normal range of motion and neck supple.      Right lower leg: No edema.      Left lower leg: No edema.   Lymphadenopathy:      Cervical: No cervical adenopathy.   Skin:     General: Skin is warm and dry.   Neurological:      General: No focal deficit present.      Mental Status: He is alert and oriented to person, place, and time. Mental status is at baseline.   Psychiatric:         Mood and Affect: Mood normal.         Behavior: Behavior normal.         Thought Content: Thought content normal.         Judgment: Judgment normal.          Sandra Ramsay [facility] PRIMARY CARE

## 2024-02-18 ENCOUNTER — APPOINTMENT (EMERGENCY)
Dept: NON INVASIVE DIAGNOSTICS | Facility: HOSPITAL | Age: 49
End: 2024-02-18
Payer: COMMERCIAL

## 2024-02-18 ENCOUNTER — HOSPITAL ENCOUNTER (EMERGENCY)
Facility: HOSPITAL | Age: 49
Discharge: HOME | End: 2024-02-18
Attending: EMERGENCY MEDICINE
Payer: COMMERCIAL

## 2024-02-18 VITALS
OXYGEN SATURATION: 93 % | WEIGHT: 280 LBS | DIASTOLIC BLOOD PRESSURE: 69 MMHG | SYSTOLIC BLOOD PRESSURE: 128 MMHG | HEART RATE: 71 BPM | RESPIRATION RATE: 18 BRPM | BODY MASS INDEX: 39.05 KG/M2 | TEMPERATURE: 98.6 F

## 2024-02-18 DIAGNOSIS — S86.811A STRAIN OF RIGHT CALF MUSCLE: Primary | ICD-10-CM

## 2024-02-18 PROCEDURE — 93971 EXTREMITY STUDY: CPT | Performed by: SURGERY

## 2024-02-18 PROCEDURE — 93971 EXTREMITY STUDY: CPT

## 2024-02-18 PROCEDURE — 99284 EMERGENCY DEPT VISIT MOD MDM: CPT | Performed by: EMERGENCY MEDICINE

## 2024-02-18 PROCEDURE — 99283 EMERGENCY DEPT VISIT LOW MDM: CPT

## 2024-02-18 NOTE — ED PROVIDER NOTES
History  Chief Complaint   Patient presents with    Leg Pain     Right leg calf pain for few days. Denies chest pain or SOB.     49-year-old male complaining of 3 to 4 days of right calf pain.  Also noted episodes of paresthesias in his right lower extremity from his knee down to his toes.  He states he is on his knees bent over position for his job quite often.  He states he gets occasional knee pain but does not have pain in the right calf or any numbness or tingling in his toes previously.  Denies any back pain.  Denies any recent injury or illness.  Denies any direct trauma.  No history of DVT or PE.  No shortness of breath.  No erythema or warmth.  No distinct swelling noted.  No calf asymmetry.  No family history of any clotting disorders.  Patient concern for DVT.  Patient works as a paramedic.        Prior to Admission Medications   Prescriptions Last Dose Informant Patient Reported? Taking?   Loratadine-Pseudoephedrine (CLARITIN-D 12 HOUR PO)  Self Yes No   Sig: Take by mouth   azelastine (ASTELIN) 0.1 % nasal spray  Self No No   Si spray into each nostril 2 (two) times a day Use in each nostril as directed   clobetasol (TEMOVATE) 0.05 % cream   No No   Sig: Apply topically 2 (two) times a day   fluticasone (FLONASE) 50 mcg/act nasal spray  Self No No   Si spray into each nostril daily   indomethacin (INDOCIN) 50 mg capsule  Self Yes No   omeprazole (PriLOSEC) 20 mg delayed release capsule   No No   Sig: Take 1 capsule (20 mg total) by mouth daily   tamsulosin (FLOMAX) 0.4 mg   No No   Sig: Take 1 capsule (0.4 mg total) by mouth 2 (two) times a day      Facility-Administered Medications: None       Past Medical History:   Diagnosis Date    Diverticulitis of colon     GERD (gastroesophageal reflux disease)     Hyperlipemia     Left otitis media with effusion     Obesity        History reviewed. No pertinent surgical history.    Family History   Problem Relation Age of Onset    Diabetes Mother      Cerebral aneurysm Mother     Stroke Father         syndrome    Stroke Maternal Grandmother         syndrome    Heart disease Paternal Grandfather     Cancer Family     Esophageal cancer Family      I have reviewed and agree with the history as documented.    E-Cigarette/Vaping    E-Cigarette Use Never User      E-Cigarette/Vaping Substances    Nicotine No     THC No     CBD No     Flavoring No     Other No     Unknown No      Social History     Tobacco Use    Smoking status: Some Days     Types: Cigars    Smokeless tobacco: Never    Tobacco comments:     (unknown if ever smoked-as per [employer])   Vaping Use    Vaping status: Never Used   Substance Use Topics    Alcohol use: Yes     Comment: socially    Drug use: No       Review of Systems   Constitutional:  Negative for activity change, appetite change, chills and fever.   HENT:  Negative for ear pain, hearing loss, rhinorrhea, sneezing, sore throat and trouble swallowing.    Eyes:  Negative for pain and visual disturbance.   Respiratory:  Negative for cough, choking, chest tightness, shortness of breath, wheezing and stridor.    Cardiovascular:  Negative for chest pain, palpitations and leg swelling.   Gastrointestinal:  Negative for abdominal pain, constipation, diarrhea, nausea and vomiting.   Genitourinary:  Negative for difficulty urinating, dysuria, frequency, hematuria and testicular pain.   Musculoskeletal:  Negative for arthralgias, back pain, gait problem and neck pain.        Right calf pain as per HPI   Skin:  Negative for color change and rash.   Allergic/Immunologic: Negative for immunocompromised state.   Neurological:  Negative for dizziness, seizures, syncope, speech difficulty, weakness, light-headedness, numbness and headaches.   All other systems reviewed and are negative.      Physical Exam  Physical Exam  Vitals and nursing note reviewed.   Constitutional:       General: He is not in acute distress.     Appearance: Normal appearance. He is  well-developed and normal weight. He is not ill-appearing, toxic-appearing or diaphoretic.   HENT:      Head: Normocephalic and atraumatic.      Nose: Nose normal.      Mouth/Throat:      Mouth: Mucous membranes are moist.      Pharynx: Oropharynx is clear.   Eyes:      General: No scleral icterus.     Extraocular Movements: Extraocular movements intact.      Conjunctiva/sclera: Conjunctivae normal.   Cardiovascular:      Rate and Rhythm: Normal rate and regular rhythm.      Pulses: Normal pulses.      Heart sounds: Normal heart sounds. No murmur heard.  Pulmonary:      Effort: Pulmonary effort is normal. No respiratory distress.      Breath sounds: Normal breath sounds. No wheezing or rales.   Chest:      Chest wall: No tenderness.   Abdominal:      General: Bowel sounds are normal. There is no distension.      Palpations: Abdomen is soft. There is no mass.      Tenderness: There is no abdominal tenderness. There is no right CVA tenderness, left CVA tenderness, guarding or rebound.   Musculoskeletal:         General: Tenderness present. No deformity or signs of injury. Normal range of motion.      Cervical back: Normal range of motion and neck supple. No rigidity or tenderness.      Right lower leg: No edema.      Left lower leg: No edema.        Legs:    Lymphadenopathy:      Cervical: No cervical adenopathy.   Skin:     General: Skin is warm and dry.      Capillary Refill: Capillary refill takes less than 2 seconds.      Coloration: Skin is not jaundiced or pale.      Findings: No bruising, erythema, lesion or rash.   Neurological:      General: No focal deficit present.      Mental Status: He is alert and oriented to person, place, and time. Mental status is at baseline.      Motor: No abnormal muscle tone.   Psychiatric:         Mood and Affect: Mood normal.         Behavior: Behavior normal.         Vital Signs  ED Triage Vitals   Temperature Pulse Respirations Blood Pressure SpO2   02/18/24 0854 02/18/24 0854  02/18/24 0852 02/18/24 0854 02/18/24 0854   98.6 °F (37 °C) 81 18 167/89 98 %      Temp Source Heart Rate Source Patient Position - Orthostatic VS BP Location FiO2 (%)   02/18/24 0854 02/18/24 0854 02/18/24 0854 02/18/24 0854 --   Temporal Monitor Sitting Right arm       Pain Score       02/18/24 0852       6           Vitals:    02/18/24 0854 02/18/24 0946   BP: 167/89 128/69   Pulse: 81 71   Patient Position - Orthostatic VS: Sitting Lying         Visual Acuity      ED Medications  Medications - No data to display    Diagnostic Studies  Results Reviewed       None                   VAS lower limb venous duplex study, unilateral/limited    (Results Pending)          Initial discussion with vascular tech negative for DVT    Procedures  Procedures         ED Course                               SBIRT 22yo+      Flowsheet Row Most Recent Value   Initial Alcohol Screen: US AUDIT-C     1. How often do you have a drink containing alcohol? 0 Filed at: 02/18/2024 0853   2. How many drinks containing alcohol do you have on a typical day you are drinking?  0 Filed at: 02/18/2024 0853   3a. Male UNDER 65: How often do you have five or more drinks on one occasion? 0 Filed at: 02/18/2024 0853   Audit-C Score 0 Filed at: 02/18/2024 0853   NICHOLE: How many times in the past year have you...    Used an illegal drug or used a prescription medication for non-medical reasons? Never Filed at: 02/18/2024 0853                      Medical Decision Making  49-year-old male complaining of right calf pain.    Problems Addressed:  Strain of right calf muscle: acute illness or injury    Amount and/or Complexity of Data Reviewed  Radiology: ordered. Decision-making details documented in ED Course.    Risk  OTC drugs.  Prescription drug management.  Risk Details: Doppler negative for DVT.  Treat for musculoskeletal etiology.  Follow-up PCP             Disposition  Final diagnoses:   Strain of right calf muscle     Time reflects when diagnosis  was documented in both MDM as applicable and the Disposition within this note       Time User Action Codes Description Comment    2/18/2024 10:17 AM Willie Peña Add [M79.661] Right calf pain     2/18/2024 10:18 AM Willie Peña Remove [M79.661] Right calf pain     2/18/2024 10:18 AM Willie Peña Add [S86.811A] Strain of right calf muscle           ED Disposition       ED Disposition   Discharge    Condition   Stable    Date/Time   Sun Feb 18, 2024 1017    Comment   Gil Chaudhry discharge to home/self care.                   Follow-up Information       Follow up With Specialties Details Why Contact Info    Sandra Ramsay DO Internal Medicine, Hospice Services Schedule an appointment as soon as possible for a visit   [address] [place] [address]  [phone]              Discharge Medication List as of 2/18/2024 10:18 AM        CONTINUE these medications which have NOT CHANGED    Details   azelastine (ASTELIN) 0.1 % nasal spray 1 spray into each nostril 2 (two) times a day Use in each nostril as directed, Starting Wed 3/2/2022, Normal      clobetasol (TEMOVATE) 0.05 % cream Apply topically 2 (two) times a day, Starting Tue 1/23/2024, Normal      fluticasone (FLONASE) 50 mcg/act nasal spray 1 spray into each nostril daily, Starting Fri 6/7/2019, Normal      indomethacin (INDOCIN) 50 mg capsule Starting Thu 8/27/2020, Historical Med      Loratadine-Pseudoephedrine (CLARITIN-D 12 HOUR PO) Take by mouth, Historical Med      omeprazole (PriLOSEC) 20 mg delayed release capsule Take 1 capsule (20 mg total) by mouth daily, Starting Tue 1/23/2024, Normal      tamsulosin (FLOMAX) 0.4 mg Take 1 capsule (0.4 mg total) by mouth 2 (two) times a day, Starting Tue 1/23/2024, Normal             No discharge procedures on file.    PDMP Review       None            ED Provider  Electronically Signed by             Willie Peña DO  02/18/24 2285

## 2024-02-19 ENCOUNTER — VBI (OUTPATIENT)
Dept: FAMILY MEDICINE CLINIC | Facility: CLINIC | Age: 49
End: 2024-02-19

## 2024-02-19 NOTE — TELEPHONE ENCOUNTER
02/19/24 10:03 AM    Patient contacted post ED visit, [facility] department spoke with patient/caregiver and outreach was successful.    Thank you.  Marta Bueno  [facility]

## 2024-05-16 ENCOUNTER — RA CDI HCC (OUTPATIENT)
Dept: OTHER | Facility: HOSPITAL | Age: 49
End: 2024-05-16

## 2024-05-23 ENCOUNTER — OFFICE VISIT (OUTPATIENT)
Dept: FAMILY MEDICINE CLINIC | Facility: CLINIC | Age: 49
End: 2024-05-23
Payer: COMMERCIAL

## 2024-05-23 VITALS
OXYGEN SATURATION: 97 % | BODY MASS INDEX: 40.6 KG/M2 | RESPIRATION RATE: 18 BRPM | HEIGHT: 71 IN | SYSTOLIC BLOOD PRESSURE: 124 MMHG | TEMPERATURE: 97.8 F | DIASTOLIC BLOOD PRESSURE: 78 MMHG | HEART RATE: 73 BPM | WEIGHT: 290 LBS

## 2024-05-23 DIAGNOSIS — R19.5 LOOSE STOOLS: ICD-10-CM

## 2024-05-23 DIAGNOSIS — R35.0 URINARY FREQUENCY: ICD-10-CM

## 2024-05-23 DIAGNOSIS — R12 HEARTBURN: Primary | ICD-10-CM

## 2024-05-23 DIAGNOSIS — K21.9 GASTROESOPHAGEAL REFLUX DISEASE WITHOUT ESOPHAGITIS: ICD-10-CM

## 2024-05-23 DIAGNOSIS — L42 PITYRIASIS ROSEA: ICD-10-CM

## 2024-05-23 DIAGNOSIS — E78.5 HYPERLIPIDEMIA, UNSPECIFIED HYPERLIPIDEMIA TYPE: ICD-10-CM

## 2024-05-23 PROBLEM — M10.9 ACUTE GOUT INVOLVING TOE OF RIGHT FOOT: Status: RESOLVED | Noted: 2018-04-21 | Resolved: 2024-05-23

## 2024-05-23 PROCEDURE — 99214 OFFICE O/P EST MOD 30 MIN: CPT | Performed by: INTERNAL MEDICINE

## 2024-05-23 RX ORDER — OMEPRAZOLE 20 MG/1
20 CAPSULE, DELAYED RELEASE ORAL DAILY
Qty: 90 CAPSULE | Refills: 3 | Status: SHIPPED | OUTPATIENT
Start: 2024-05-23

## 2024-05-23 RX ORDER — CLOBETASOL PROPIONATE 0.5 MG/G
CREAM TOPICAL 2 TIMES DAILY
Qty: 30 G | Refills: 3 | Status: SHIPPED | OUTPATIENT
Start: 2024-05-23

## 2024-05-23 RX ORDER — TAMSULOSIN HYDROCHLORIDE 0.4 MG/1
0.4 CAPSULE ORAL 2 TIMES DAILY
Qty: 180 CAPSULE | Refills: 3 | Status: SHIPPED | OUTPATIENT
Start: 2024-05-23

## 2024-05-23 NOTE — PROGRESS NOTES
Ambulatory Visit  Name: Gil Chaudhry      : 1975      MRN: [id number]  Encounter Provider: Sandra Ramsay DO  Encounter Date: 2024   Encounter department: [place] [facility]    Assessment & Plan   1. Heartburn  -     Ambulatory Referral to Gastroenterology; Future  -     Comprehensive metabolic panel; Future  Sxs still noted despite efforts to change diet etc so reeval by GI ordered and pt will transition to [employer]   2. Pityriasis rosea  -     clobetasol (TEMOVATE) 0.05 % cream; Apply topically 2 (two) times a day  -     CBC and Platelet; Future  Stable and recheck labs next visit   3. Gastroesophageal reflux disease without esophagitis  -     omeprazole (PriLOSEC) 20 mg delayed release capsule; Take 1 capsule (20 mg total) by mouth daily  4. Urinary frequency  -     tamsulosin (FLOMAX) 0.4 mg; Take 1 capsule (0.4 mg total) by mouth 2 (two) times a day  Sxs are improved and he is aware due for repeat psa, US and urology appt which he will coordinate   5. Loose stools  -     Ambulatory Referral to Gastroenterology; Future  HE has episodes ? IBS GI eval ordered   6. Hyperlipidemia, unspecified hyperlipidemia type  -     Lipid panel; Future  Low fat diet He has been walking and trying to modify portions and cut iced tea       Depression Screening and Follow-up Plan: Patient was screened for depression during today's encounter. They screened negative with a PHQ-2 score of 0.  Rto 6months  History of Present Illness     HPI  PT BP has been overall good range and he does check at home NO headache No vision changes No chest pain He has tried to modify his diet - reducing iced tea and cutting portions He walks almost daily with his wife which he does feel good doing He still has hectic schedule but he is working to try and limit calls off hours etc He does have more heartburn despite PPI daily and no recent GI followup Occasional episodes of loose stools as well Unclear if diet related/stress factor  "No abdominal pain or consistent sxs No melena     Review of Systems   Constitutional:  Negative for chills and fever.   HENT: Negative.     Eyes:  Negative for visual disturbance.   Respiratory:  Negative for cough and shortness of breath.    Cardiovascular:  Negative for chest pain, palpitations and leg swelling.   Gastrointestinal:  Positive for abdominal distention and abdominal pain.   Genitourinary: Negative.    Musculoskeletal:  Positive for arthralgias.   Neurological:  Negative for dizziness, light-headedness and headaches.     Past Medical History:   Diagnosis Date   • Diverticulitis of colon    • GERD (gastroesophageal reflux disease)    • Hyperlipemia    • Left otitis media with effusion    • Obesity      .[facility]  Social History     Socioeconomic History   • Marital status: /Civil Union     Spouse name: Not on file   • Number of children: Not on file   • Years of education: Not on file   • Highest education level: Not on file   Occupational History   • Not on file   Tobacco Use   • Smoking status: Some Days     Types: Cigars   • Smokeless tobacco: Never   • Tobacco comments:     (unknown if ever smoked-as per [employer])   Vaping Use   • Vaping status: Never Used   Substance and Sexual Activity   • Alcohol use: Yes     Comment: socially   • Drug use: No   • Sexual activity: Yes   Other Topics Concern   • Not on file   Social History Narrative   • Not on file     Social Determinants of Health     Financial Resource Strain: Not on file   Food Insecurity: Not on file   Transportation Needs: Not on file   Physical Activity: Not on file   Stress: Not on file   Social Connections: Not on file   Intimate Partner Violence: Not on file   Housing Stability: Not on file     Allergies   Allergen Reactions   • Azithromycin Rash   • Doxycycline Rash   • Penicillins Rash         Objective     /78   Pulse 73   Temp 97.8 °F (36.6 °C) (Temporal)   Resp 18   Ht 5' 11\" (1.803 m)   Wt 132 kg (290 lb)   SpO2 " 97%   BMI 40.45 kg/m²     Physical Exam  Vitals and nursing note reviewed.   Constitutional:       General: He is not in acute distress.     Appearance: Normal appearance. He is not ill-appearing, toxic-appearing or diaphoretic.   HENT:      Head: Normocephalic and atraumatic.      Right Ear: External ear normal.      Left Ear: External ear normal.      Nose: Nose normal.      Mouth/Throat:      Mouth: Mucous membranes are moist.   Eyes:      General: No scleral icterus.     Extraocular Movements: Extraocular movements intact.      Conjunctiva/sclera: Conjunctivae normal.      Pupils: Pupils are equal, round, and reactive to light.   Cardiovascular:      Rate and Rhythm: Normal rate and regular rhythm.      Pulses: Normal pulses.   Pulmonary:      Effort: Pulmonary effort is normal. No respiratory distress.      Breath sounds: Normal breath sounds. No wheezing.   Abdominal:      General: Bowel sounds are normal. There is no distension.      Palpations: Abdomen is soft.      Tenderness: There is no abdominal tenderness.   Musculoskeletal:         General: Normal range of motion.      Cervical back: Normal range of motion and neck supple.      Right lower leg: No edema.      Left lower leg: No edema.   Lymphadenopathy:      Cervical: No cervical adenopathy.   Skin:     General: Skin is warm and dry.   Neurological:      General: No focal deficit present.      Mental Status: He is alert and oriented to person, place, and time. Mental status is at baseline.      Cranial Nerves: No cranial nerve deficit.   Psychiatric:         Mood and Affect: Mood normal.         Behavior: Behavior normal.         Thought Content: Thought content normal.         Judgment: Judgment normal.   Administrative Statements

## 2024-07-31 ENCOUNTER — APPOINTMENT (OUTPATIENT)
Dept: LAB | Facility: MEDICAL CENTER | Age: 49
End: 2024-07-31
Payer: COMMERCIAL

## 2024-07-31 DIAGNOSIS — R12 HEARTBURN: ICD-10-CM

## 2024-07-31 DIAGNOSIS — L42 PITYRIASIS ROSEA: ICD-10-CM

## 2024-07-31 DIAGNOSIS — E78.5 HYPERLIPIDEMIA, UNSPECIFIED HYPERLIPIDEMIA TYPE: ICD-10-CM

## 2024-07-31 LAB
ALBUMIN SERPL BCG-MCNC: 3.9 G/DL (ref 3.5–5)
ALP SERPL-CCNC: 44 U/L (ref 34–104)
ALT SERPL W P-5'-P-CCNC: 21 U/L (ref 7–52)
ANION GAP SERPL CALCULATED.3IONS-SCNC: 8 MMOL/L (ref 4–13)
AST SERPL W P-5'-P-CCNC: 19 U/L (ref 13–39)
BILIRUB SERPL-MCNC: 0.54 MG/DL (ref 0.2–1)
BUN SERPL-MCNC: 22 MG/DL (ref 5–25)
CALCIUM SERPL-MCNC: 9.2 MG/DL (ref 8.4–10.2)
CHLORIDE SERPL-SCNC: 103 MMOL/L (ref 96–108)
CHOLEST SERPL-MCNC: 178 MG/DL
CO2 SERPL-SCNC: 27 MMOL/L (ref 21–32)
CREAT SERPL-MCNC: 0.87 MG/DL (ref 0.6–1.3)
ERYTHROCYTE [DISTWIDTH] IN BLOOD BY AUTOMATED COUNT: 13.7 % (ref 11.6–15.1)
GFR SERPL CREATININE-BSD FRML MDRD: 101 ML/MIN/1.73SQ M
GLUCOSE P FAST SERPL-MCNC: 93 MG/DL (ref 65–99)
HCT VFR BLD AUTO: 45.4 % (ref 36.5–49.3)
HDLC SERPL-MCNC: 39 MG/DL
HGB BLD-MCNC: 14.7 G/DL (ref 12–17)
LDLC SERPL CALC-MCNC: 112 MG/DL (ref 0–100)
MCH RBC QN AUTO: 27.8 PG (ref 26.8–34.3)
MCHC RBC AUTO-ENTMCNC: 32.4 G/DL (ref 31.4–37.4)
MCV RBC AUTO: 86 FL (ref 82–98)
NONHDLC SERPL-MCNC: 139 MG/DL
PLATELET # BLD AUTO: 194 THOUSANDS/UL (ref 149–390)
PMV BLD AUTO: 11.1 FL (ref 8.9–12.7)
POTASSIUM SERPL-SCNC: 3.9 MMOL/L (ref 3.5–5.3)
PROT SERPL-MCNC: 6.7 G/DL (ref 6.4–8.4)
RBC # BLD AUTO: 5.29 MILLION/UL (ref 3.88–5.62)
SODIUM SERPL-SCNC: 138 MMOL/L (ref 135–147)
TRIGL SERPL-MCNC: 133 MG/DL
WBC # BLD AUTO: 7.47 THOUSAND/UL (ref 4.31–10.16)

## 2024-07-31 PROCEDURE — 80061 LIPID PANEL: CPT

## 2024-07-31 PROCEDURE — 80053 COMPREHEN METABOLIC PANEL: CPT

## 2024-07-31 PROCEDURE — 36415 COLL VENOUS BLD VENIPUNCTURE: CPT

## 2024-07-31 PROCEDURE — 85027 COMPLETE CBC AUTOMATED: CPT

## 2024-08-13 ENCOUNTER — TELEPHONE (OUTPATIENT)
Age: 49
End: 2024-08-13

## 2024-08-13 DIAGNOSIS — N40.1 BPH WITH OBSTRUCTION/LOWER URINARY TRACT SYMPTOMS: Primary | ICD-10-CM

## 2024-08-13 DIAGNOSIS — N13.8 BPH WITH OBSTRUCTION/LOWER URINARY TRACT SYMPTOMS: Primary | ICD-10-CM

## 2024-08-13 DIAGNOSIS — Z12.5 SCREENING FOR PROSTATE CANCER: ICD-10-CM

## 2024-08-13 NOTE — TELEPHONE ENCOUNTER
Patient called back stating he called central scheduling to schedule his ultrasound and they told him it . Can a new order be placed? Please advise.    US kidney and bladder with pvr

## 2024-08-13 NOTE — TELEPHONE ENCOUNTER
Patient of Dr. Walter called for  yearly PSA blood work order and to check if U/S kidney and bladder was in chart to be complete.     Ordered PSA  and advised patient U/S was there in chart to have complete. Gave patient central scheduling phone number to call and schedule.

## 2024-08-13 NOTE — TELEPHONE ENCOUNTER
Spoke with patient and made him aware updated renal US order was placed in his chart. He will contact central scheduling to schedule imaging.

## 2024-08-26 ENCOUNTER — HOSPITAL ENCOUNTER (OUTPATIENT)
Dept: ULTRASOUND IMAGING | Facility: HOSPITAL | Age: 49
Discharge: HOME | End: 2024-08-26
Payer: COMMERCIAL

## 2024-08-26 DIAGNOSIS — N40.1 BPH WITH OBSTRUCTION/LOWER URINARY TRACT SYMPTOMS: ICD-10-CM

## 2024-08-26 DIAGNOSIS — N13.8 BPH WITH OBSTRUCTION/LOWER URINARY TRACT SYMPTOMS: ICD-10-CM

## 2024-08-26 PROCEDURE — 76770 US EXAM ABDO BACK WALL COMP: CPT

## 2024-08-28 ENCOUNTER — TELEPHONE (OUTPATIENT)
Dept: UROLOGY | Facility: CLINIC | Age: 49
End: 2024-08-28

## 2024-08-28 ENCOUNTER — APPOINTMENT (OUTPATIENT)
Dept: LAB | Facility: MEDICAL CENTER | Age: 49
End: 2024-08-28
Payer: COMMERCIAL

## 2024-08-28 DIAGNOSIS — Z12.5 SCREENING FOR PROSTATE CANCER: ICD-10-CM

## 2024-08-28 LAB — PSA SERPL-MCNC: 1.66 NG/ML (ref 0–4)

## 2024-08-28 PROCEDURE — 36415 COLL VENOUS BLD VENIPUNCTURE: CPT

## 2024-08-28 PROCEDURE — G0103 PSA SCREENING: HCPCS

## 2024-08-28 NOTE — TELEPHONE ENCOUNTER
----- Message from MADIYSN Vizcarra sent at 8/28/2024 12:32 PM EDT -----  Please the patient know that his ultrasound is normal.  He can follow-up as scheduled.

## 2024-08-29 ENCOUNTER — TELEPHONE (OUTPATIENT)
Dept: UROLOGY | Facility: CLINIC | Age: 49
End: 2024-08-29

## 2024-08-29 NOTE — TELEPHONE ENCOUNTER
----- Message from Madhu Kidd PA-C sent at 8/29/2024  9:28 AM EDT -----  PSA is stable at 1.6.  Patient overdue for yearly follow-up.  He can schedule for next available

## 2024-08-29 NOTE — TELEPHONE ENCOUNTER
Called and confirmed with pt but he stated he does not want to set up follow up Rn as he does not have his calendar

## 2024-09-18 ENCOUNTER — CONSULT (OUTPATIENT)
Dept: GASTROENTEROLOGY | Facility: CLINIC | Age: 49
End: 2024-09-18
Payer: COMMERCIAL

## 2024-09-18 VITALS
DIASTOLIC BLOOD PRESSURE: 76 MMHG | TEMPERATURE: 97.6 F | HEART RATE: 70 BPM | SYSTOLIC BLOOD PRESSURE: 115 MMHG | HEIGHT: 71 IN | OXYGEN SATURATION: 96 % | WEIGHT: 293 LBS | BODY MASS INDEX: 41.02 KG/M2

## 2024-09-18 DIAGNOSIS — R19.5 LOOSE STOOLS: Primary | ICD-10-CM

## 2024-09-18 DIAGNOSIS — K58.2 IRRITABLE BOWEL SYNDROME WITH BOTH CONSTIPATION AND DIARRHEA: ICD-10-CM

## 2024-09-18 DIAGNOSIS — Z86.010 HISTORY OF COLON POLYPS: ICD-10-CM

## 2024-09-18 DIAGNOSIS — K57.90 DIVERTICULOSIS: ICD-10-CM

## 2024-09-18 DIAGNOSIS — K21.9 GASTROESOPHAGEAL REFLUX DISEASE WITHOUT ESOPHAGITIS: ICD-10-CM

## 2024-09-18 PROBLEM — Z86.0100 HISTORY OF COLON POLYPS: Status: ACTIVE | Noted: 2024-09-18

## 2024-09-18 PROCEDURE — 99204 OFFICE O/P NEW MOD 45 MIN: CPT | Performed by: NURSE PRACTITIONER

## 2024-09-18 NOTE — PROGRESS NOTES
[facility] - Outpatient Consultation  Gil Chaudhry 49 y.o. male MRN: [id number]  Encounter: [id number]          ASSESSMENT AND PLAN:    The patient presents today for an initial consultation for     Exam:  Oral mucosa normal upon visual inspection, without any sores, lesions, or ulcerations. Sclera without icterus and benign. Lung sounds CTA b/l. Normal S1 & S2 upon exam. Abdomen his history of loose stools, diverticulosis, irritable bowel syndrome with both constipation and diarrhea, history of colon polyps, and chronic GERD.. No edema noted of the b/l lower extremities upon exam today. Skin is non-icteric.     1. Loose stools  -     Fecal fat, qualitative; Future  -     Giardia/Cryptosporidium EIA; Future  -     Pancreatic elastase, fecal; Future  -     Stool Enteric Bacterial Panel by PCR; Future  2. Diverticulosis  3. Irritable bowel syndrome with both constipation and diarrhea  Assessment & Plan:  While the patient was in the office today, I did discuss with the patient that at this point in time I am suspicious that with his chronic loose stool/diarrhea that there is a possibility of underlying EPI and feel that it would be in his best interest to proceed with stool studies to evaluate and rule out any underlying infectious or parasitic cause as well as EPI.  I advised the patient that once we have the results of the stool studies, our office will contact him to review the results and discuss any other treatment plan recommendations.    Encouraged the patient to continue to work on increasing his daily water intake to at least 2-3 bottles of water per day as I am suspicious that part of his issue is that when he gets constipated he then gets overflow diarrhea.  Orders:  -     Fecal fat, qualitative; Future  -     Giardia/Cryptosporidium EIA; Future  -     Pancreatic elastase, fecal; Future  -     Stool Enteric Bacterial Panel by PCR; Future  4. History of colon  polyps  Assessment & Plan:  We will hold off on any repeat colonoscopies until the recommended surveillance date unless the patient would start to develop red flag symptoms in the future.  The patient was agreeable and verbalized an understanding.  DUE: 7/27/25    Reviewed GI red flag symptoms with patient today.    5. Gastroesophageal reflux disease without esophagitis  Assessment & Plan:  Stable  Continue omeprazole as prescribed by primary care provider.  Encouraged the patient to avoid acidic or trigger foods including alcohol as much as possible.  Encouraged the patient to consider purchasing a wedge pillow to help prevent reflux symptoms while sleeping.  Encouraged the patient not to lay down or sleep for at least 3 to 4 hours after eating and to try to avoid late night snacking.      The patient will schedule a follow up office visit in 1 year, but understands to call or contact our office if there are any issues or concerns in the mean time.    ______________________________________________________________________    HPI: The patient is a 49 y.o. male who presents today for a consultation regarding his history of loose stools, diverticulosis, irritable bowel syndrome with both constipation and diarrhea, history of colon polyps, and chronic GERD.  The patient reports that he is not having any significant or new issues but that his chronic loose stools and abdominal pain prior to and with his bowel movements along with the variability of going from constipation to diarrhea without any trigger or pattern is just starting to become intrusive into his life.  The patient reports that he has not been able to identify any kind of trigger foods or predict any kind of consistency or frequency of his symptoms.  He reports that his symptoms are not new or significantly different they just seem to be worsening as time goes on and that he seems to be having loose stools and diarrhea more the constipation.    The patient  currently denies any reflux, nausea, dysphagia, vomiting, decreased appetite, unplanned weight loss, or abdominal pain. Water Intake: 1-2 bottles daily.     The patient currently reports that they have a BM e and reports that it is sometimes relieving, without any nocturnal BMs, straining, melena or bloody stools). Hunter: 3-6. Last BM: Yesterday. Flatus: Yes, especially in mornings.      PMH/PSH:   Abdominal/Chest Surgery: Denied  Colon Cancer: Denied  Any Cancer: Denied  Pre-Cancerous Polyps: Denied  Crohn's: Denied  Ulcerative Colitis: Denied  Woo's Esophagus: Denied  Celiac Disease: Denied  Liver Disease: Denied    Tobacco/Vaping: Occasionally cigar use, 1-2 x week.   ETOH: Socially  Marijuana: Denied  Illicit Drug Use: Denied    FH:  Colon Cancer: Denied  Any Cancer: Denied  Family Members with Pre-Cancerous Polyps: Denied  Crohn's: Denied  Ulcerative Colitis: Denied  Celiac Disease: Denied  Liver Disease: Denied    Meds: Omeprazole 20 mg daily.   Daily NSAID Use: Ibuprofen almost daily or a few times a week, tries to take it with food.   Daily Tylenol Use: Denied  Any New Meds: Denied      Imaging: (12/4/22) CT of the chest abdomen and pelvis with and without contrast: Fatty infiltration of the pancreas with evidence of diverticulosis without diverticulitis, otherwise normal.    Endoscopy History: EGD: (7/27/20) Dr Ladd:  Normal.    Surveillance Due: 7/27/25    COLONOSCOPY: (7/27/20): Diverticulosis with 1 polyp removed and a recommendation of a repeat colonoscopy in 5 years secondary to a personal history of colon polyps.    DUE: 7/27/25    REVIEW OF SYSTEMS:    CONSTITUTIONAL: Denies any fever, chills, rigors, and weight loss.  HEENT: No earache or tinnitus. Denies hearing loss or visual disturbances.  CARDIOVASCULAR: No chest pain or palpitations.   RESPIRATORY: Denies any cough, hemoptysis, shortness of breath or dyspnea on exertion.  GASTROINTESTINAL: As noted in the History of Present Illness.    GENITOURINARY: No problems with urination. Denies any hematuria or dysuria.  NEUROLOGIC: No dizziness or vertigo, denies headaches.   MUSCULOSKELETAL: Denies any muscle or joint pain.   SKIN: Denies skin rashes or itching.   ENDOCRINE: Denies excessive thirst. Denies intolerance to heat or cold.  PSYCHOSOCIAL: Denies depression or anxiety. Denies any recent memory loss.       Historical Information   Past Medical History:   Diagnosis Date    Diverticulitis of colon     GERD (gastroesophageal reflux disease)     Hyperlipemia     Left otitis media with effusion     Obesity      No past surgical history on file.  Social History   Social History     Substance and Sexual Activity   Alcohol Use Yes    Comment: socially     Social History     Substance and Sexual Activity   Drug Use No     Social History     Tobacco Use   Smoking Status Some Days    Types: Cigars   Smokeless Tobacco Never   Tobacco Comments    (unknown if ever smoked-as per [employer])     Family History   Problem Relation Age of Onset    Diabetes Mother     Cerebral aneurysm Mother     Stroke Father         syndrome    Stroke Maternal Grandmother         syndrome    Heart disease Paternal Grandfather     Cancer Family     Esophageal cancer Family        Meds/Allergies       Current Outpatient Medications:     azelastine (ASTELIN) 0.1 % nasal spray    ciprofloxacin-dexamethasone (CIPRODEX) otic suspension    clobetasol (TEMOVATE) 0.05 % cream    fluticasone (FLONASE) 50 mcg/act nasal spray    indomethacin (INDOCIN) 50 mg capsule    Loratadine-Pseudoephedrine (CLARITIN-D 12 HOUR PO)    omeprazole (PriLOSEC) 20 mg delayed release capsule    tamsulosin (FLOMAX) 0.4 mg    Allergies   Allergen Reactions    Azithromycin Rash    Doxycycline Rash    Penicillins Rash           Objective     There were no vitals taken for this visit. There is no height or weight on file to calculate BMI.        PHYSICAL EXAM:      General Appearance:   Alert, cooperative, no  distress   HEENT:   Normocephalic, atraumatic, anicteric.     Neck:  Supple, symmetrical, trachea midline   Lungs:   Clear to auscultation bilaterally; no rales, rhonchi or wheezing; respirations unlabored    Heart::   Regular rate and rhythm; no murmur, rub, or gallop.   Abdomen:   Soft, non-tender, non-distended; normal bowel sounds; no masses, no organomegaly    Genitalia:   Deferred    Rectal:   Deferred    Extremities:  No cyanosis, clubbing or edema    Pulses:  2+ and symmetric    Skin:  No jaundice, rashes, or lesions    Lymph nodes:  No palpable cervical lymphadenopathy        Lab Results:   No visits with results within 1 Day(s) from this visit.   Latest known visit with results is:   Appointment on 08/28/2024   Component Date Value    PSA 08/28/2024 1.662          Radiology Results:   US kidney and bladder with pvr    Result Date: 8/28/2024  Narrative: RENAL ULTRASOUND WITH PVR INDICATION: N40.1: Benign prostatic hyperplasia with lower urinary tract symptoms N13.8: Other obstructive and reflux uropathy. COMPARISON: CTA chest abdomen pelvis 12/4/2022. TECHNIQUE: Ultrasound of the retroperitoneum was performed with a curvilinear transducer utilizing volumetric sweeps and still imaging techniques. FINDINGS: KIDNEYS: Symmetric and normal size. Right kidney: 11.5 x 7.4 x 6.7 cm. Volume 297.3 mL Left kidney: 11.2 x 7.2 x 5.7 cm. Volume 241.2 mL Right kidney Right kidney is suboptimally visualized due to shadowing. Echogenicity and contour appear normal within these confines. No mass is identified. No hydronephrosis. No shadowing calculi. No perinephric fluid collections. Left kidney Kidney is left kidney is suboptimally visualized due to shadowing. Echogenicity and contour appear normal within these confines. No mass is identified. No hydronephrosis. No shadowing calculi. No perinephric fluid collections. URETERS: Nonvisualized. BLADDER: Incompletely distended, limiting evaluation. No focal thickening or mass  lesions identified within these confines. Bilateral ureteral jets detected. Prevoid: 137.9 Mild post void residual volume. Measured post void volume in mL: 88.4 The prostate measures 4.8 x 6.2 x 4.0 cm (volume 63.14 mL). Prominent median lobe exerts mass effect on the bladder.     Impression: 1. Kidneys are suboptimally visualized due to shadowing. No hydronephrosis or other sonographic abnormalities identified within these confines. 2. Prostatomegaly. 3. Mild post void residual (post void urinary bladder volume 88.4 mL). Workstation performed: [id number]

## 2024-09-18 NOTE — PATIENT INSTRUCTIONS
Proceed with stools studies when able.   Continue omeprazole as prescribed.   Encouraged the patient to avoid acidic or trigger foods including alcohol as much as possible.  Encouraged the patient to consider purchasing a wedge pillow to help prevent reflux symptoms while sleeping.  Encouraged the patient not to lay down or sleep for at least 3 to 4 hours after eating and to try to avoid late night snacking.  Can consider probiotics to help regulate.   Work on increasing daily water intake to at least 2-3 bottles per day.   Continue to watch for red flag symptoms.   Schedule a f/u OV in 1 year.     We did review GI red flag symptoms, including, but not limited to: chronic nausea, vomiting, diarrhea, chills, fever, and unintentional weight loss and should call or contact our office with any changes or concerns. I reviewed with the patient that if they notice any blood while vomiting or in their stool they should contact or office or go to the nearest emergency room for immediate evaluation. The patient was agreeable and verbalized an understanding.

## 2024-09-19 NOTE — ASSESSMENT & PLAN NOTE
We will hold off on any repeat colonoscopies until the recommended surveillance date unless the patient would start to develop red flag symptoms in the future.  The patient was agreeable and verbalized an understanding.  DUE: 7/27/25    Reviewed GI red flag symptoms with patient today.

## 2024-09-19 NOTE — ASSESSMENT & PLAN NOTE
Stable  Continue omeprazole as prescribed by primary care provider.  Encouraged the patient to avoid acidic or trigger foods including alcohol as much as possible.  Encouraged the patient to consider purchasing a wedge pillow to help prevent reflux symptoms while sleeping.  Encouraged the patient not to lay down or sleep for at least 3 to 4 hours after eating and to try to avoid late night snacking.

## 2024-09-19 NOTE — ASSESSMENT & PLAN NOTE
While the patient was in the office today, I did discuss with the patient that at this point in time I am suspicious that with his chronic loose stool/diarrhea that there is a possibility of underlying EPI and feel that it would be in his best interest to proceed with stool studies to evaluate and rule out any underlying infectious or parasitic cause as well as EPI.  I advised the patient that once we have the results of the stool studies, our office will contact him to review the results and discuss any other treatment plan recommendations.    Encouraged the patient to continue to work on increasing his daily water intake to at least 2-3 bottles of water per day as I am suspicious that part of his issue is that when he gets constipated he then gets overflow diarrhea.

## 2024-11-05 ENCOUNTER — OFFICE VISIT (OUTPATIENT)
Dept: OTOLARYNGOLOGY | Facility: CLINIC | Age: 49
End: 2024-11-05

## 2024-11-05 VITALS
SYSTOLIC BLOOD PRESSURE: 120 MMHG | BODY MASS INDEX: 41.3 KG/M2 | DIASTOLIC BLOOD PRESSURE: 82 MMHG | TEMPERATURE: 97.9 F | OXYGEN SATURATION: 97 % | HEART RATE: 73 BPM | HEIGHT: 71 IN | WEIGHT: 295 LBS

## 2024-11-05 DIAGNOSIS — J34.3 NASAL TURBINATE HYPERTROPHY: ICD-10-CM

## 2024-11-05 DIAGNOSIS — H69.92 DYSFUNCTION OF LEFT EUSTACHIAN TUBE: Primary | ICD-10-CM

## 2024-11-05 DIAGNOSIS — H65.92 OME (OTITIS MEDIA WITH EFFUSION), LEFT: ICD-10-CM

## 2024-11-05 DIAGNOSIS — G47.33 OSA ON CPAP: ICD-10-CM

## 2024-11-05 DIAGNOSIS — Z96.22 HISTORY OF TYMPANOSTOMY TUBE PLACEMENT: ICD-10-CM

## 2024-11-05 DIAGNOSIS — R09.81 NASAL CONGESTION: ICD-10-CM

## 2024-11-05 NOTE — PROGRESS NOTES
Review of Systems   Constitutional: Negative.    HENT: Negative.     Eyes: Negative.    Respiratory: Negative.     Gastrointestinal: Negative.    Endocrine: Negative.    Genitourinary: Negative.    Musculoskeletal: Negative.    Skin: Negative.    Allergic/Immunologic: Positive for environmental allergies.   Neurological: Negative.    Hematological: Negative.    Psychiatric/Behavioral: Negative.

## 2024-11-05 NOTE — PROGRESS NOTES
"[facility] Otolaryngology follow up visit      Gil Chaudhry is a 49 y.o. male who presents with a chief complaint of ears, congestion    Independent historian: none      Time interval of problem since last visit:  3 months     Pertinent elements of the history:  Left tympanostomy tube placed by Dr. Ricks on 08/21/24  Prior left tube placement by Dr. Alonzo in 2019    Ears feels okay since tube placement  Hearing is good  No longer feels blocked  Can still feel crackling/popping intermittently, like theres fluid inside     No otorrhea -- maybe occasional   No otalgia     Nasal congestion, not all the time, but more so when he is using his CPAP  Will use Afrin before bed time as a \"preventative measure\"    Astelin and Flonase haven't been helpful     Seasonal allergies  No persistent sinus issues      Review of any relevant imaging: images from any scan reviewed personally  Scans:   Labs:   Notes:     Review of Systems:  As above    PMHx:  Past Medical History:   Diagnosis Date    Diverticulitis of colon     GERD (gastroesophageal reflux disease)     Hyperlipemia     Left otitis media with effusion     Obesity         FAMHx:  Family History   Problem Relation Age of Onset    Diabetes Mother     Cerebral aneurysm Mother     Stroke Father         syndrome    Stroke Maternal Grandmother         syndrome    Heart disease Paternal Grandfather     Cancer Family     Esophageal cancer Family        SOCHx:  Social History     Socioeconomic History    Marital status: /Civil Union     Spouse name: Not on file    Number of children: Not on file    Years of education: Not on file    Highest education level: Not on file   Occupational History    Not on file   Tobacco Use    Smoking status: Some Days     Types: Cigars    Smokeless tobacco: Never    Tobacco comments:     (unknown if ever smoked-as per [employer])   Vaping Use    Vaping status: Never Used   Substance and Sexual Activity    Alcohol use: Yes     " "Comment: socially    Drug use: No    Sexual activity: Yes   Other Topics Concern    Not on file   Social History Narrative    Not on file     Social Determinants of Health     Financial Resource Strain: Not on file   Food Insecurity: Not on file   Transportation Needs: Not on file   Physical Activity: Not on file   Stress: Not on file   Social Connections: Unknown (6/18/2024)    Received from [employer]     How often do you feel lonely or isolated from those around you? (Adult - for ages 18 years and over): Not on file   Intimate Partner Violence: Not on file   Housing Stability: Not on file       Allergies:  Allergies   Allergen Reactions    Azithromycin Rash    Doxycycline Rash    Penicillins Rash        MEDS:  Reviewed      Physical exam: (abnormal findings appear in bold and supercede any conflicting normal findings listed below)    /82 (BP Location: Left arm, Patient Position: Sitting, Cuff Size: Large)   Pulse 73   Temp 97.9 °F (36.6 °C) (Temporal)   Ht 5' 11\" (1.803 m)   Wt 134 kg (295 lb)   SpO2 97%   BMI 41.14 kg/m²     Constitutional:  Well developed, well nourished and groomed, in no acute distress.     Eyes:  Extra-ocular movements intact, pupils equally round and reactive to light and accommodation, the lids and conjunctivae are normal in appearance.    Head: Atraumatic, normocephalic, no visible scalp lesions, bony palpation unremarkable without stepoffs, parotid and submandibular salivary glands non-tender to palpation and without masses bilaterally.     Ears:  Auricles normal in appearance bilaterally, mastoid prominence non-tender, external auditory canals clear bilaterally, tympanic membranes intact bilaterally without evidence of middle ear effusion or masses, normal appearing ossicles. Left tube placed and patent. Canal is dry. Right EAC and middle ear clear. TM intact.    Nose/Sinuses:  External appearance unremarkable, no maxillary or frontal sinus tenderness to " palpation bilaterally. Anterior rhinoscopy demonstrates pink mucosa. No polyps or other masses identified. Turbinates are non-edematous. No evidence of purulent drainage. Inferior turbinate hypertrophy.    Oral Cavity:  Moist mucus membranes, gums and dentition unremarkable, no oral mucosal masses or lesions, floor of mouth soft, tongue mobile without masses or lesions.     Oropharynx:  Base of tongue soft and without masses, tonsils bilaterally unremarkable, soft palate mucosa unremarkable.     Neck:  No visible or palpable cervical lesions or lymphadenopathy, thyroid gland is normal in size and symmetry and without masses, normal laryngeal elevation with swallowing.     Cardiovascular:  Normal rate and rhythm, no palpable thrills, no jugulovenous distension observed.  Respiratory:  Normal respiratory effort without evidence of retractions or use of accessory muscles.  Integument:  Normal appearing without observed masses or lesions.  Neurologic:  Cranial nerves II-XII intact bilaterally.  Psychiatric:  Alert and oriented to time, place and person, normal affect.      Procedure:      Audiometry:  Offered repeat hearing test, patient defers.    Audiogram 08/2024 reviewed with patient left conductive hearing loss. Right ear normal hearing. Word recognition left, 96% at 75 dB, right 100 % at 55 dB. Tympanograms type B left, right considered type A, but slightly more negative pressure.        Assessment:  1. Dysfunction of left eustachian tube        2. OME (otitis media with effusion), left        3. History of tympanostomy tube placement        4. Nasal congestion        5. Nasal turbinate hypertrophy        6. YAMIL on CPAP            Plan:  1. Gil Chaudhry is a 49 y.o. male with acute and chronic problems as above who presents for a tube check. Left tympanostomy tube placed by Dr. Ricks on 08/21/24. He had a prior tube placed by Dr. Alonzo in 2019. He no longer has blockage. Feels back to baseline, but  "there are times where he notices some crackling in the ears. He also mentions nasal congestion, especially with using CPAP. Afrin qHS.    Left PE tube patent on exam. Recommend repeat hearing test to ensure hearing back to baseline, but patient deferred today.  Inferior turbinate hypertrophy on anterior rhinoscopy.     Tympanostomy tube placement  We discussed expectations and care of tympanostomy tubes. Otic drops PRN otorrhea. Need for additional sets of tubes after extrusion, consideration of ET dilation.     Nasal congestion  We also discussed his nasal congestion, trying to avoid Afrin, though he hasn't found Flonase/Astelin helpful. Can consider turbinate reduction procedures under local vs. General anesthesia.     YAMIL on CPAP  For now, he's happy to continue CPAP. Option of Inspire was briefly discussed as an alternative.      He's not interested in additional procedures at this time. We will continue to monitor.    Fu 6m        ** Please Note: Portions of the record may have been created with voice recognition software. Occasional wrong word or \"sound a like\" substitutions may have occurred due to the inherent limitations of voice recognition software. There may also be notations and random deletions of words or characters from malfunctioning software. Read the chart carefully and recognize, using context, where substitutions/deletions have occurred.**    "

## 2024-11-27 ENCOUNTER — OFFICE VISIT (OUTPATIENT)
Dept: FAMILY MEDICINE CLINIC | Facility: CLINIC | Age: 49
End: 2024-11-27
Payer: COMMERCIAL

## 2024-11-27 VITALS
TEMPERATURE: 97.8 F | SYSTOLIC BLOOD PRESSURE: 122 MMHG | RESPIRATION RATE: 18 BRPM | BODY MASS INDEX: 41.3 KG/M2 | OXYGEN SATURATION: 98 % | DIASTOLIC BLOOD PRESSURE: 70 MMHG | HEIGHT: 71 IN | WEIGHT: 295 LBS | HEART RATE: 66 BPM

## 2024-11-27 DIAGNOSIS — J45.20 MILD INTERMITTENT ASTHMA WITHOUT COMPLICATION: ICD-10-CM

## 2024-11-27 DIAGNOSIS — R35.0 URINARY FREQUENCY: ICD-10-CM

## 2024-11-27 DIAGNOSIS — L42 PITYRIASIS ROSEA: ICD-10-CM

## 2024-11-27 DIAGNOSIS — E78.5 HYPERLIPIDEMIA, UNSPECIFIED HYPERLIPIDEMIA TYPE: Primary | ICD-10-CM

## 2024-11-27 DIAGNOSIS — K21.9 GASTROESOPHAGEAL REFLUX DISEASE WITHOUT ESOPHAGITIS: ICD-10-CM

## 2024-11-27 PROCEDURE — 99214 OFFICE O/P EST MOD 30 MIN: CPT | Performed by: INTERNAL MEDICINE

## 2024-11-27 RX ORDER — TAMSULOSIN HYDROCHLORIDE 0.4 MG/1
0.4 CAPSULE ORAL 2 TIMES DAILY
Qty: 180 CAPSULE | Refills: 3 | Status: SHIPPED | OUTPATIENT
Start: 2024-11-27

## 2024-11-27 RX ORDER — CLOBETASOL PROPIONATE 0.5 MG/G
CREAM TOPICAL 2 TIMES DAILY
Qty: 30 G | Refills: 3 | Status: SHIPPED | OUTPATIENT
Start: 2024-11-27

## 2024-11-27 NOTE — PROGRESS NOTES
Name: Gil Chaudhry      : 1975      MRN: [id number]  Encounter Provider: Sandra Ramsay DO  Encounter Date: 2024   Encounter department: [facility]  :  Assessment & Plan  Pityriasis rosea    Orders:    clobetasol (TEMOVATE) 0.05 % cream; Apply topically 2 (two) times a day  COntinue steroid cream prn Recommend daily moisturizer   Gastroesophageal reflux disease without esophagitis    Orders:    omeprazole (PriLOSEC) 20 mg delayed release capsule; Take 1 capsule (20 mg total) by mouth daily  Sxs stable on ppi but encouraged healthier diet/lifestyle  Urinary frequency    Orders:    tamsulosin (FLOMAX) 0.4 mg; Take 1 capsule (0.4 mg total) by mouth 2 (two) times a day  Sxs are better since on rx but he still has some challenges which may be due to his schedule variance He plans Urology followup  Mild intermittent asthma without complication  Sxs stable Prn inhaler        Hyperlipidemia, unspecified hyperlipidemia type    Orders:    Lipid panel; Future    Comprehensive metabolic panel; Future  Discussed healthier lifestyle changes to improve overall health    Rto 6months     Depression Screening and Follow-up Plan: Patient was screened for depression during today's encounter. They screened negative with a PHQ-2 score of 0.    Rto 6months/annual  History of Present Illness     HPI  Pt doing ok He is aware of weight gain and admits schedule/diet choices not ideal and wants to improve He works essentially 2 fulltime jobs and it is catching up with him He is working to improve and plans to try to make a longer term plan for improvement No chest pain Stomach issues are better on ppi daily and he knows diet improvement would help as well Urinary sxs are better on rx but he still has some limiting sxs which may be due to schedule/diet He recognizes he works a lot and does not have much diversion   Review of Systems   Constitutional:  Negative for chills and fever.   HENT: Negative.     Eyes:   Negative for visual disturbance.   Respiratory:  Negative for cough and shortness of breath.    Cardiovascular: Negative.    Gastrointestinal: Negative.    Genitourinary:  Positive for frequency.   Musculoskeletal: Negative.    Neurological:  Negative for dizziness, light-headedness and headaches.   Psychiatric/Behavioral:  Negative for sleep disturbance. The patient is not nervous/anxious.      Past Medical History:   Diagnosis Date    Diverticulitis of colon     GERD (gastroesophageal reflux disease)     Hyperlipemia     Left otitis media with effusion 9/4/2019    Obesity      History reviewed. No pertinent surgical history.  Social History     Socioeconomic History    Marital status: /Civil Union     Spouse name: Not on file    Number of children: Not on file    Years of education: Not on file    Highest education level: Not on file   Occupational History    Not on file   Tobacco Use    Smoking status: Some Days     Types: Cigars    Smokeless tobacco: Never    Tobacco comments:     (unknown if ever smoked-as per [employer])   Vaping Use    Vaping status: Never Used   Substance and Sexual Activity    Alcohol use: Yes     Comment: socially    Drug use: No    Sexual activity: Yes   Other Topics Concern    Not on file   Social History Narrative    Not on file     Social Drivers of Health     Financial Resource Strain: Not on file   Food Insecurity: Not on file   Transportation Needs: Not on file   Physical Activity: Not on file   Stress: Not on file   Social Connections: Unknown (6/18/2024)    Received from [facility]     How often do you feel lonely or isolated from those around you? (Adult - for ages 18 years and over): Not on file   Intimate Partner Violence: Not on file   Housing Stability: Not on file     Allergies   Allergen Reactions    Azithromycin Rash    Doxycycline Rash    Penicillins Rash            Objective   /70   Pulse 66   Temp 97.8 °F (36.6 °C) (Temporal)   Resp  "18   Ht 5' 11\" (1.803 m)   Wt 134 kg (295 lb)   SpO2 98%   BMI 41.14 kg/m²      Physical Exam  Vitals and nursing note reviewed.   Constitutional:       General: He is not in acute distress.     Appearance: Normal appearance. He is not ill-appearing, toxic-appearing or diaphoretic.   HENT:      Head: Normocephalic and atraumatic.      Right Ear: External ear normal.      Left Ear: External ear normal.      Nose: Nose normal.      Mouth/Throat:      Mouth: Mucous membranes are moist.   Eyes:      General: No scleral icterus.     Extraocular Movements: Extraocular movements intact.      Conjunctiva/sclera: Conjunctivae normal.      Pupils: Pupils are equal, round, and reactive to light.   Cardiovascular:      Rate and Rhythm: Normal rate and regular rhythm.      Pulses: Normal pulses.      Heart sounds: Normal heart sounds. No murmur heard.  Pulmonary:      Effort: Pulmonary effort is normal. No respiratory distress.      Breath sounds: Normal breath sounds. No wheezing.   Abdominal:      General: Bowel sounds are normal. There is no distension.      Palpations: Abdomen is soft.      Tenderness: There is no abdominal tenderness.   Musculoskeletal:      Cervical back: Normal range of motion and neck supple.      Right lower leg: No edema.      Left lower leg: No edema.   Lymphadenopathy:      Cervical: No cervical adenopathy.   Skin:     General: Skin is warm and dry.      Coloration: Skin is not jaundiced or pale.   Neurological:      General: No focal deficit present.      Mental Status: He is alert and oriented to person, place, and time. Mental status is at baseline.      Cranial Nerves: No cranial nerve deficit.      Sensory: No sensory deficit.   Psychiatric:         Mood and Affect: Mood normal.         Behavior: Behavior normal.         Thought Content: Thought content normal.         Judgment: Judgment normal.         "

## 2024-11-27 NOTE — ASSESSMENT & PLAN NOTE
Orders:    clobetasol (TEMOVATE) 0.05 % cream; Apply topically 2 (two) times a day  COntinue steroid cream prn Recommend daily moisturizer

## 2024-11-27 NOTE — ASSESSMENT & PLAN NOTE
Orders:    tamsulosin (FLOMAX) 0.4 mg; Take 1 capsule (0.4 mg total) by mouth 2 (two) times a day  Sxs are better since on rx but he still has some challenges which may be due to his schedule variance He plans Urology followup

## 2024-11-27 NOTE — ASSESSMENT & PLAN NOTE
Orders:    omeprazole (PriLOSEC) 20 mg delayed release capsule; Take 1 capsule (20 mg total) by mouth daily  Sxs stable on ppi but encouraged healthier diet/lifestyle

## 2024-12-21 ENCOUNTER — OFFICE VISIT (OUTPATIENT)
Dept: URGENT CARE | Facility: MEDICAL CENTER | Age: 49
End: 2024-12-21
Payer: COMMERCIAL

## 2024-12-21 VITALS
WEIGHT: 295 LBS | OXYGEN SATURATION: 98 % | BODY MASS INDEX: 41.14 KG/M2 | RESPIRATION RATE: 20 BRPM | SYSTOLIC BLOOD PRESSURE: 120 MMHG | DIASTOLIC BLOOD PRESSURE: 80 MMHG | TEMPERATURE: 98.7 F | HEART RATE: 76 BPM

## 2024-12-21 DIAGNOSIS — L02.211 CUTANEOUS ABSCESS OF ABDOMINAL WALL: Primary | ICD-10-CM

## 2024-12-21 PROCEDURE — 99213 OFFICE O/P EST LOW 20 MIN: CPT

## 2024-12-21 RX ORDER — CLINDAMYCIN HYDROCHLORIDE 300 MG/1
300 CAPSULE ORAL 4 TIMES DAILY
Qty: 28 CAPSULE | Refills: 0 | Status: SHIPPED | OUTPATIENT
Start: 2024-12-21 | End: 2024-12-28

## 2024-12-21 NOTE — PROGRESS NOTES
[facility]        NAME: Gil Chaudhry is a 49 y.o. male  : 1975    MRN: [id number]  DATE: 2024  TIME: 6:55 PM    Assessment and Plan   Cutaneous abscess of abdominal wall [L02.211]  1. Cutaneous abscess of abdominal wall  clindamycin (CLEOCIN) 300 MG capsule            Patient Instructions       Follow up with PCP in 3-5 days.  Proceed to  ER if symptoms worsen.    If tests are performed, our office will contact you with results only if changes need to made to the care plan discussed with you at the visit. [facility]    Chief Complaint     Chief Complaint   Patient presents with   • Wound Infection     Abscess to right abdomen. Which started last pm.large reddened area. Warm to touch, No drainage         History of Present Illness       Patient noted an area on his right side which appeared like a pimple. Wife was able to open and drain it. Tonight he went to get a shower and noted it was larger and inflamed. NO fevers or chills.         Review of Systems   Review of Systems   Constitutional:  Negative for chills, fatigue and fever.   Respiratory:  Negative for cough and shortness of breath.    Gastrointestinal:  Negative for abdominal pain, constipation, diarrhea, nausea and vomiting.   Skin:  Positive for wound. Negative for color change and rash.   Neurological:  Negative for dizziness, light-headedness and headaches.         Current Medications       Current Outpatient Medications:   •  azelastine (ASTELIN) 0.1 % nasal spray, 1 spray into each nostril 2 (two) times a day Use in each nostril as directed, Disp: 30 mL, Rfl: 1  •  clindamycin (CLEOCIN) 300 MG capsule, Take 1 capsule (300 mg total) by mouth 4 (four) times a day for 7 days, Disp: 28 capsule, Rfl: 0  •  clobetasol (TEMOVATE) 0.05 % cream, Apply topically 2 (two) times a day, Disp: 30 g, Rfl: 3  •  fluticasone (FLONASE) 50 mcg/act nasal spray, 1 spray into each nostril  daily, Disp: 16 g, Rfl: 0  •  indomethacin (INDOCIN) 50 mg capsule, , Disp: , Rfl:   •  Loratadine-Pseudoephedrine (CLARITIN-D 12 HOUR PO), Take by mouth, Disp: , Rfl:   •  omeprazole (PriLOSEC) 20 mg delayed release capsule, Take 1 capsule (20 mg total) by mouth daily, Disp: 90 capsule, Rfl: 3  •  tamsulosin (FLOMAX) 0.4 mg, Take 1 capsule (0.4 mg total) by mouth 2 (two) times a day, Disp: 180 capsule, Rfl: 3    Current Allergies     Allergies as of 12/21/2024 - Reviewed 12/21/2024   Allergen Reaction Noted   • Azithromycin Rash 06/15/2012   • Doxycycline Rash 06/07/2022   • Penicillins Rash 11/13/2013            The following portions of the patient's history were reviewed and updated as appropriate: allergies, current medications, past family history, past medical history, past social history, past surgical history and problem list.     Past Medical History:   Diagnosis Date   • Diverticulitis of colon    • GERD (gastroesophageal reflux disease)    • Hyperlipemia    • Left otitis media with effusion 9/4/2019   • Obesity        History reviewed. No pertinent surgical history.    Family History   Problem Relation Age of Onset   • Diabetes Mother    • Cerebral aneurysm Mother    • Stroke Father         syndrome   • Stroke Maternal Grandmother         syndrome   • Heart disease Paternal Grandfather    • Cancer Family    • Esophageal cancer Family          Medications have been verified.        Objective   /80   Pulse 76   Temp 98.7 °F (37.1 °C)   Resp 20   Wt 134 kg (295 lb)   SpO2 98%   BMI 41.14 kg/m²        Physical Exam     Physical Exam  Vitals and nursing note reviewed.   Constitutional:       General: He is awake. He is not in acute distress.     Appearance: Normal appearance. He is well-developed. He is obese. He is not ill-appearing.   HENT:      Head: Normocephalic and atraumatic.      Mouth/Throat:      Lips: Pink.      Mouth: Mucous membranes are moist.   Cardiovascular:      Rate and Rhythm:  Normal rate and regular rhythm.      Pulses: Normal pulses.      Heart sounds: Normal heart sounds.   Pulmonary:      Effort: Pulmonary effort is normal.      Breath sounds: Normal breath sounds.   Skin:     General: Skin is warm and dry.      Capillary Refill: Capillary refill takes less than 2 seconds.      Findings: Erythema present. No rash.          Neurological:      General: No focal deficit present.      Mental Status: He is alert. Mental status is at baseline.   Psychiatric:         Mood and Affect: Mood normal.         Behavior: Behavior is cooperative.

## 2024-12-26 ENCOUNTER — HOSPITAL ENCOUNTER (EMERGENCY)
Facility: HOSPITAL | Age: 49
Discharge: HOME | End: 2024-12-26
Attending: EMERGENCY MEDICINE
Payer: COMMERCIAL

## 2024-12-26 VITALS
DIASTOLIC BLOOD PRESSURE: 76 MMHG | BODY MASS INDEX: 41.3 KG/M2 | OXYGEN SATURATION: 93 % | HEART RATE: 74 BPM | TEMPERATURE: 97.5 F | RESPIRATION RATE: 18 BRPM | WEIGHT: 295 LBS | HEIGHT: 71 IN | SYSTOLIC BLOOD PRESSURE: 126 MMHG

## 2024-12-26 DIAGNOSIS — L02.91 ABSCESS: Primary | ICD-10-CM

## 2024-12-26 PROCEDURE — 99284 EMERGENCY DEPT VISIT MOD MDM: CPT | Performed by: EMERGENCY MEDICINE

## 2024-12-26 PROCEDURE — 99282 EMERGENCY DEPT VISIT SF MDM: CPT

## 2024-12-26 PROCEDURE — 10061 I&D ABSCESS COMP/MULTIPLE: CPT | Performed by: EMERGENCY MEDICINE

## 2024-12-26 RX ORDER — LIDOCAINE HYDROCHLORIDE AND EPINEPHRINE 10; 10 MG/ML; UG/ML
10 INJECTION, SOLUTION INFILTRATION; PERINEURAL ONCE
Status: COMPLETED | OUTPATIENT
Start: 2024-12-26 | End: 2024-12-26

## 2024-12-26 RX ORDER — SULFAMETHOXAZOLE AND TRIMETHOPRIM 800; 160 MG/1; MG/1
1 TABLET ORAL 2 TIMES DAILY
Qty: 14 TABLET | Refills: 0 | Status: SHIPPED | OUTPATIENT
Start: 2024-12-26 | End: 2025-01-02

## 2024-12-26 RX ADMIN — LIDOCAINE HYDROCHLORIDE,EPINEPHRINE BITARTRATE 10 ML: 10; .01 INJECTION, SOLUTION INFILTRATION; PERINEURAL at 14:24

## 2024-12-26 NOTE — ED PROVIDER NOTES
Time reflects when diagnosis was documented in both MDM as applicable and the Disposition within this note       Time User Action Codes Description Comment    12/26/2024  2:31 PM GradyStefanie Thomason Add [L02.91] Abscess           ED Disposition       ED Disposition   Discharge    Condition   Stable    Date/Time   u Dec 26, 2024  2:31 PM    Comment   Quaker ANGELITA Chaudhry discharge to home/self care.                   Assessment & Plan       Medical Decision Making  Risk  Prescription drug management.      49-year-old male presenting for evaluation of an abscess.  Patient has abscess on the right lower abdominal wall, no deep abdominal tenderness, no fevers or systemic symptoms.  Patient is on clindamycin without improvement in symptoms.  Will incise and drain abscess.  Will have patient continue clindamycin as previously prescribed.  Will also prescribe Bactrim to be started if still not having significant improvement after incision and drainage.  Will have patient follow-up with his primary care doctor.  Discussed with patient strict return precautions.  Patient expressed understanding and was agreeable for discharge.       Medications   lidocaine-epinephrine (XYLOCAINE/EPINEPHRINE) 1 %-1:100,000 injection 10 mL (10 mL Infiltration Given by Other 12/26/24 1424)       ED Risk Strat Scores                          SBIRT 20yo+      Flowsheet Row Most Recent Value   Initial Alcohol Screen: US AUDIT-C     1. How often do you have a drink containing alcohol? 0 Filed at: 12/26/2024 1344   2. How many drinks containing alcohol do you have on a typical day you are drinking?  0 Filed at: 12/26/2024 1344   3a. Male UNDER 65: How often do you have five or more drinks on one occasion? 0 Filed at: 12/26/2024 1344   3b. FEMALE Any Age, or MALE 65+: How often do you have 4 or more drinks on one occassion? 0 Filed at: 12/26/2024 1344   Audit-C Score 0 Filed at: 12/26/2024 1344   NICHOLE: How many times in the past year have you...    Used  an illegal drug or used a prescription medication for non-medical reasons? Never Filed at: 12/26/2024 6444                            History of Present Illness       Chief Complaint   Patient presents with    Abscess     Patient reports abscess to right lower abdomen that began on Saturday. States he was seen at  on Sunday and started on clindamycin. Pt reports that the abscess is getting larger and now draining.         Past Medical History:   Diagnosis Date    Diverticulitis of colon     GERD (gastroesophageal reflux disease)     Hyperlipemia     Left otitis media with effusion 9/4/2019    Obesity       History reviewed. No pertinent surgical history.   Family History   Problem Relation Age of Onset    Diabetes Mother     Cerebral aneurysm Mother     Stroke Father         syndrome    Stroke Maternal Grandmother         syndrome    Heart disease Paternal Grandfather     Cancer Family     Esophageal cancer Family       Social History     Tobacco Use    Smoking status: Some Days     Types: Cigars    Smokeless tobacco: Never    Tobacco comments:     (unknown if ever smoked-as per [employer])   Vaping Use    Vaping status: Never Used   Substance Use Topics    Alcohol use: Yes     Comment: socially    Drug use: No      E-Cigarette/Vaping    E-Cigarette Use Never User       E-Cigarette/Vaping Substances    Nicotine No     THC No     CBD No     Flavoring No     Other No     Unknown No       I have reviewed and agree with the history as documented.     HPI    49-year-old male presenting for evaluation of an abscess.  Patient states he noticed an abscess to the right lower abdominal wall starting about a week ago.  He was seen in urgent care and prescribed clindamycin.  He states it was draining within stopped draining yesterday.  He states that is gotten worse since then.  Denies any fevers.  Denies nausea, vomiting, or diarrhea.  Denies any deeper abdominal pain.  Denies history of abscesses in the past.    Review of  Systems   Constitutional:  Negative for appetite change, chills and fever.   HENT:  Negative for congestion, rhinorrhea and sore throat.    Respiratory:  Negative for cough and shortness of breath.    Cardiovascular:  Negative for chest pain.   Gastrointestinal:  Negative for abdominal pain, diarrhea, nausea and vomiting.   Genitourinary:  Negative for dysuria, frequency, hematuria and urgency.   Musculoskeletal:  Negative for arthralgias and myalgias.   Skin:  Positive for wound. Negative for rash.   Neurological:  Negative for dizziness, weakness, light-headedness, numbness and headaches.   All other systems reviewed and are negative.          Objective       ED Triage Vitals [12/26/24 1342]   Temperature Pulse Blood Pressure Respirations SpO2 Patient Position - Orthostatic VS   97.5 °F (36.4 °C) 85 132/79 18 96 % Sitting      Temp Source Heart Rate Source BP Location FiO2 (%) Pain Score    Temporal Monitor Left arm -- No Pain      Vitals      Date and Time Temp Pulse SpO2 Resp BP Pain Score FACES Pain Rating User   12/26/24 1400 97.5 °F (36.4 °C) 74 93 % 18 126/76 No Pain -- PP   12/26/24 1342 97.5 °F (36.4 °C) 85 96 % 18 132/79 No Pain -- PP            Physical Exam  Vitals and nursing note reviewed.   Constitutional:       General: He is not in acute distress.     Appearance: Normal appearance. He is well-developed. He is obese. He is not ill-appearing, toxic-appearing or diaphoretic.   HENT:      Head: Normocephalic and atraumatic.      Right Ear: External ear normal.      Left Ear: External ear normal.      Nose: Nose normal.      Mouth/Throat:      Mouth: Mucous membranes are moist.      Pharynx: Oropharynx is clear.   Eyes:      Extraocular Movements: Extraocular movements intact.      Conjunctiva/sclera: Conjunctivae normal.   Cardiovascular:      Rate and Rhythm: Normal rate and regular rhythm.      Pulses: Normal pulses.      Heart sounds: Normal heart sounds. No murmur heard.     No friction rub. No  gallop.   Pulmonary:      Effort: Pulmonary effort is normal. No respiratory distress.      Breath sounds: Normal breath sounds. No wheezing or rales.   Abdominal:      General: There is no distension.      Palpations: Abdomen is soft.      Tenderness: There is no abdominal tenderness. There is no guarding or rebound.   Musculoskeletal:         General: No tenderness.      Cervical back: Neck supple.   Skin:     General: Skin is warm and dry.      Coloration: Skin is not pale.      Findings: No rash.      Comments: Erythema and induration to the right lower abdominal wall with area of fluctuance in the center of the wound.   Neurological:      General: No focal deficit present.      Mental Status: He is alert and oriented to person, place, and time.      Cranial Nerves: No cranial nerve deficit.      Sensory: No sensory deficit.      Motor: No weakness.   Psychiatric:         Mood and Affect: Mood normal.         Behavior: Behavior normal.         Results Reviewed       None            No orders to display       Incision and drain    Date/Time: 12/26/2024 2:26 PM    Performed by: Stefanie Araiza MD  Authorized by: Stefanie Araiza MD  [facility] Protocol:  procedure performed by consultantConsent: Verbal consent obtained.  Risks and benefits: risks, benefits and alternatives were discussed  Consent given by: patient  Patient understanding: patient states understanding of the procedure being performed  Required items: required blood products, implants, devices, and special equipment available  Patient identity confirmed: verbally with patient    Patient location:  ED  Location:     Type:  Abscess    Size:  3 cm x 3 cm    Location:  Trunk    Trunk location:  Abdomen  Anesthesia (see MAR for exact dosages):     Anesthesia method:  Local infiltration    Local anesthetic:  Lidocaine 1% WITH epi  Procedure details:     Complexity:  Simple    Needle aspiration: no      Incision types:  Stab incision    Scalpel blade:  11     Approach:  Open    Incision depth:  Subcutaneous    Wound management:  Probed and deloculated    Drainage:  Purulent    Drainage amount:  Moderate    Wound treatment:  Wound left open    Packing materials:  None  Post-procedure details:     Patient tolerance of procedure:  Tolerated well, no immediate complications      ED Medication and Procedure Management   Prior to Admission Medications   Prescriptions Last Dose Informant Patient Reported? Taking?   Loratadine-Pseudoephedrine (CLARITIN-D 12 HOUR PO)  Self Yes No   Sig: Take by mouth   azelastine (ASTELIN) 0.1 % nasal spray  Self No No   Si spray into each nostril 2 (two) times a day Use in each nostril as directed   clindamycin (CLEOCIN) 300 MG capsule 2024  No Yes   Sig: Take 1 capsule (300 mg total) by mouth 4 (four) times a day for 7 days   clobetasol (TEMOVATE) 0.05 % cream   No No   Sig: Apply topically 2 (two) times a day   fluticasone (FLONASE) 50 mcg/act nasal spray  Self No No   Si spray into each nostril daily   indomethacin (INDOCIN) 50 mg capsule  Self Yes No   omeprazole (PriLOSEC) 20 mg delayed release capsule 2024  No Yes   Sig: Take 1 capsule (20 mg total) by mouth daily   tamsulosin (FLOMAX) 0.4 mg 2024  No Yes   Sig: Take 1 capsule (0.4 mg total) by mouth 2 (two) times a day      Facility-Administered Medications: None     Discharge Medication List as of 2024  2:32 PM        START taking these medications    Details   sulfamethoxazole-trimethoprim (BACTRIM DS) 800-160 mg per tablet Take 1 tablet by mouth 2 (two) times a day for 7 days smx-tmp DS (BACTRIM) 800-160 mg tabs (1tab q12 D10), Starting Thu 2024, Until Thu 2025, Normal           CONTINUE these medications which have NOT CHANGED    Details   clindamycin (CLEOCIN) 300 MG capsule Take 1 capsule (300 mg total) by mouth 4 (four) times a day for 7 days, Starting Sat 2024, Until Sat 2024, Normal      omeprazole (PriLOSEC) 20 mg delayed  release capsule Take 1 capsule (20 mg total) by mouth daily, Starting Wed 11/27/2024, Normal      tamsulosin (FLOMAX) 0.4 mg Take 1 capsule (0.4 mg total) by mouth 2 (two) times a day, Starting Wed 11/27/2024, Normal      azelastine (ASTELIN) 0.1 % nasal spray 1 spray into each nostril 2 (two) times a day Use in each nostril as directed, Starting Wed 3/2/2022, Normal      clobetasol (TEMOVATE) 0.05 % cream Apply topically 2 (two) times a day, Starting Wed 11/27/2024, Normal      fluticasone (FLONASE) 50 mcg/act nasal spray 1 spray into each nostril daily, Starting Fri 6/7/2019, Normal      indomethacin (INDOCIN) 50 mg capsule Starting Thu 8/27/2020, Historical Med      Loratadine-Pseudoephedrine (CLARITIN-D 12 HOUR PO) Take by mouth, Historical Med           No discharge procedures on file.  ED SEPSIS DOCUMENTATION   Time reflects when diagnosis was documented in both MDM as applicable and the Disposition within this note       Time User Action Codes Description Comment    12/26/2024  2:31 PM Stefanie Araiza Add [L02.91] Abscess                  Stefanie Araiza MD  12/27/24 0812

## 2024-12-26 NOTE — DISCHARGE INSTRUCTIONS
Continue clindamycin as prescribed.  If you are still having pain or not having improvement, you may switch to Bactrim 2 times a day.  If you are having severe worsening swelling or pain, or develop fevers, return to the emergency department.

## 2024-12-27 ENCOUNTER — VBI (OUTPATIENT)
Dept: FAMILY MEDICINE CLINIC | Facility: CLINIC | Age: 49
End: 2024-12-27

## 2024-12-27 NOTE — TELEPHONE ENCOUNTER
12/27/24 12:25 PM    Patient contacted post ED visit, [facility] department spoke with patient/caregiver and outreach was successful.    Thank you.  Albin Greene MA  [facility]

## 2025-04-24 ENCOUNTER — TELEPHONE (OUTPATIENT)
Dept: GASTROENTEROLOGY | Facility: CLINIC | Age: 50
End: 2025-04-24

## 2025-04-24 NOTE — TELEPHONE ENCOUNTER
Calling to schedule 1 year follow up office visit with Miko Morales. Joby needs to call us back to schedule because he is on rotating shifts and needs his schedule. I explained he is heavily booked and next available are currently October. He understood.

## 2025-05-27 ENCOUNTER — OFFICE VISIT (OUTPATIENT)
Dept: OTOLARYNGOLOGY | Facility: CLINIC | Age: 50
End: 2025-05-27

## 2025-05-27 ENCOUNTER — RA CDI HCC (OUTPATIENT)
Dept: OTHER | Facility: HOSPITAL | Age: 50
End: 2025-05-27

## 2025-05-27 ENCOUNTER — OFFICE VISIT (OUTPATIENT)
Dept: AUDIOLOGY | Facility: CLINIC | Age: 50
End: 2025-05-27
Payer: COMMERCIAL

## 2025-05-27 VITALS
DIASTOLIC BLOOD PRESSURE: 80 MMHG | HEART RATE: 70 BPM | RESPIRATION RATE: 16 BRPM | OXYGEN SATURATION: 98 % | SYSTOLIC BLOOD PRESSURE: 129 MMHG | HEIGHT: 71 IN | BODY MASS INDEX: 40.94 KG/M2 | WEIGHT: 292.4 LBS | TEMPERATURE: 98.4 F

## 2025-05-27 DIAGNOSIS — R09.81 NASAL CONGESTION: ICD-10-CM

## 2025-05-27 DIAGNOSIS — H69.91 EUSTACHIAN TUBE DYSFUNCTION, RIGHT: ICD-10-CM

## 2025-05-27 DIAGNOSIS — Z96.22 S/P TYMPANOSTOMY TUBE PLACEMENT: ICD-10-CM

## 2025-05-27 DIAGNOSIS — H90.72 MIXED CONDUCTIVE AND SENSORINEURAL HEARING LOSS OF LEFT EAR WITH UNRESTRICTED HEARING OF RIGHT EAR: Primary | ICD-10-CM

## 2025-05-27 PROCEDURE — 92557 COMPREHENSIVE HEARING TEST: CPT | Performed by: AUDIOLOGIST-HEARING AID FITTER

## 2025-05-27 PROCEDURE — 92567 TYMPANOMETRY: CPT | Performed by: AUDIOLOGIST-HEARING AID FITTER

## 2025-05-27 RX ORDER — FLUTICASONE PROPIONATE 50 MCG
SPRAY, SUSPENSION (ML) NASAL
Qty: 16 G | Refills: 5 | Status: SHIPPED | OUTPATIENT
Start: 2025-05-27

## 2025-05-27 NOTE — PROGRESS NOTES
"ASSESSMENT:        1. Mixed conductive and sensorineural hearing loss of left ear with unrestricted hearing of right ear  Ambulatory Referral to Audiology      2. Eustachian tube dysfunction, right        3. S/P tympanostomy tube placement        4. Nasal congestion  fluticasone (FLONASE) 50 mcg/act nasal spray           PLAN:  Explained left PE tube is patent, although he has left mixed hearing loss.  Right ETD, with essentially normal hearing on the right.      After further discussion, it was learned that the patient was holding his head back when previously using nasal sprays, therefore the medication was not being delivered appropriately.  I have asked him to resume Flonase.  Explained proper nasal spray administration technique.    Recheck in 6 months, sooner if he notices subjective change in his hearing.         Patient ID: Gil Chaudhry is a 50 y.o. male.    SUBJECTIVE: The patient presents for follow-up Left PE tube check.  He states he is recovering from a recent headcold, notes that his Left ear still feels somewhat clogged.  He has been taking Claritin-D, but has been off Flonase and azelastine, states he was not benefiting from these medications.       OBJECTIVE:    Vitals:    05/27/25 1101   BP: 129/80   Patient Position: Sitting   Cuff Size: Standard   Pulse: 70   Resp: 16   Temp: 98.4 °F (36.9 °C)   TempSrc: Temporal   SpO2: 98%   Weight: 133 kg (292 lb 6.4 oz)   Height: 5' 11\" (1.803 m)        Physical Exam    Auricles normal.  No mastoid region tenderness or swelling on palpation.  External auditory canals patent.  The right tympanic membrane is intact, mildly retracted, without discernable middle ear effusion.  The Left TM has a white PE tube which appears to be in good position and is grossly patent.  No discernable middle ear effusion on the left.  Nares patent.  Hypertrophic inferior nasal turbinates as noted on prior exam.  Oral mucosa moist.  Oropharynx clear.  No palpable " periauricular or cervical lymphadenopathy.    PROCEDURE:     Comprehensive Audiogram (80379):  AD: Mild mixed HL   AS: Normal hearing     WRS:   100% AU.    Tympanometry (79732):  AD: Type C  AS: Type B with increased ECV of 10.0

## 2025-05-27 NOTE — PROGRESS NOTES
ENT HEARING EVALUATION    Name:  Gil Chaudhry  :  1975  Age:  50 y.o.   MRN:  [id number]  Date of Evaluation: 25     HISTORY:      Gil Chaudhry is being seen today for an evaluation of hearing as part of their ENT visit.   EVALUATION:    Otoscopy was completed during ENT visit.    Tympanometry:   Right Ear: Type C; significant negative middle ear pressure in the presence of normal static compliance, consistent with Eustachian tube dysfunction or middle ear pathology.    Left Ear: Type B (large volume); no measurable middle ear pressure or static compliance, consistent with a patent PE tube/grommet or tympanic membrane perforation    Speech Audiometry:  Speech Reception (SRT)   Right Ear: 20 dB HL   Left Ear: 25 dB HL    Word Recognition Scores (WRS):  Right Ear: excellent (100 % correct)     Left Ear: excellent (100 % correct)   Stimuli: [id number]    Pure Tone Audiometry:  Conventional pure tone audiometry from 250 - 8000 Hz  was obtained with good reliability and revealed the following:     Right Ear: Normal hearing sensitivity   Left Ear: Mild mixed hearing loss (MHL)       *see attached audiogram      RECOMMENDATIONS:  Consult ENT      Mame Griggs, CCC-A  Clinical Audiologist

## 2025-06-03 ENCOUNTER — NURSE TRIAGE (OUTPATIENT)
Dept: OTOLARYNGOLOGY | Facility: CLINIC | Age: 50
End: 2025-06-03

## 2025-06-03 ENCOUNTER — OFFICE VISIT (OUTPATIENT)
Dept: OTOLARYNGOLOGY | Facility: CLINIC | Age: 50
End: 2025-06-03

## 2025-06-03 VITALS
RESPIRATION RATE: 16 BRPM | BODY MASS INDEX: 40.21 KG/M2 | HEIGHT: 71 IN | HEART RATE: 92 BPM | TEMPERATURE: 98.1 F | SYSTOLIC BLOOD PRESSURE: 116 MMHG | WEIGHT: 287.2 LBS | OXYGEN SATURATION: 96 % | DIASTOLIC BLOOD PRESSURE: 83 MMHG

## 2025-06-03 DIAGNOSIS — Z96.22 HISTORY OF TYMPANOSTOMY TUBE PLACEMENT: ICD-10-CM

## 2025-06-03 DIAGNOSIS — H69.92 EUSTACHIAN TUBE DYSFUNCTION, LEFT: ICD-10-CM

## 2025-06-03 DIAGNOSIS — H92.22 OTORRHAGIA, LEFT: Primary | ICD-10-CM

## 2025-06-03 RX ORDER — CIPROFLOXACIN AND DEXAMETHASONE 3; 1 MG/ML; MG/ML
SUSPENSION/ DROPS AURICULAR (OTIC)
Qty: 7.5 ML | Refills: 1 | Status: SHIPPED | OUTPATIENT
Start: 2025-06-03

## 2025-06-03 NOTE — PROGRESS NOTES
"ASSESSMENT:       1. Otorrhagia, left  ciprofloxacin-dexamethasone (CIPRODEX) otic suspension      2. Eustachian tube dysfunction, left        3. History of tympanostomy tube placement             PLAN: Rx Ciprodex 5 drops left ear twice daily x 1 week.  Assuming the patient's condition resolved, follow-up in 6 months as previously recommended.       Patient ID: Gil Chaudhry is a 50 y.o. male.    SUBJECTIVE: The patient complains of sudden onset of bloody discharge from his left ear yesterday.  He states this was preceded by discomfort and clogged sensation in his ear.  He was last seen at [facility] on 5/27, for routine follow-up of eustachian tube dysfunction and nasal congestion.      OBJECTIVE:    Vitals:    06/03/25 1304   BP: 116/83   Patient Position: Sitting   Cuff Size: Standard   Pulse: 92   Resp: 16   Temp: 98.1 °F (36.7 °C)   TempSrc: Temporal   SpO2: 96%   Weight: 130 kg (287 lb 3.2 oz)   Height: 5' 11\" (1.803 m)        Physical Exam   Auricles normal.  No mastoid region tenderness or swelling bilaterally.  The right external auditory canal is patent, and the right tympanic membrane appears grossly intact, without discernible middle ear effusion.  There is a small amount of serosanguineous fluid within the medial aspect of the left external auditory canal, which I gently removed with suction.  This is emanating from the lumen of the patient's left PE tube, which remains in good position.  The patient's left tympanic membrane is without injection or erythema.  No palpable periauricular or cervical lymphadenopathy.    "

## 2025-06-03 NOTE — TELEPHONE ENCOUNTER
"Patient was seen last Tuesday in the office by Rashad. Patient states his ears have been popping a lot. Starting bleeding yesterday after noon while at work    Additional Information   Bloody ear discharge and after an ear injury    Answer Assessment - Initial Assessment Questions  1. LOCATION: \"Which ear is involved?\"       Left ear  2. COLOR: \"What is the color of the discharge?\"       Blood  3. CONSISTENCY: \"How runny is the discharge? Could it be water?\"       Consistent  4. ONSET: \"When did you first notice the discharge?\"      Yesterday afternoon  5. PAIN: \"Is there any earache?\" \"How bad is it?\"  (Scale 0-10; none, mild, moderate or severe)      No Pain  6. OBJECTS: \"Have you put anything in your ear?\" (e.g., Q-tip, other object)       No  7. OTHER SYMPTOMS: \"Do you have any other symptoms?\" (e.g., headache, fever, dizziness, vomiting, runny nose)      No  8. PREGNANCY: \"Is there any chance you are pregnant?\" \"When was your last menstrual period?\"      N/A    Protocols used: Ear - Discharge-Adult-[place]    "

## 2025-07-25 ENCOUNTER — RA CDI HCC (OUTPATIENT)
Dept: OTHER | Facility: HOSPITAL | Age: 50
End: 2025-07-25

## 2025-07-29 ENCOUNTER — APPOINTMENT (OUTPATIENT)
Dept: LAB | Facility: MEDICAL CENTER | Age: 50
End: 2025-07-29
Attending: INTERNAL MEDICINE
Payer: COMMERCIAL

## 2025-07-29 DIAGNOSIS — E78.5 HYPERLIPIDEMIA, UNSPECIFIED HYPERLIPIDEMIA TYPE: ICD-10-CM

## 2025-07-29 LAB
ALBUMIN SERPL BCG-MCNC: 4 G/DL (ref 3.5–5)
ALP SERPL-CCNC: 51 U/L (ref 34–104)
ALT SERPL W P-5'-P-CCNC: 20 U/L (ref 7–52)
ANION GAP SERPL CALCULATED.3IONS-SCNC: 9 MMOL/L (ref 4–13)
AST SERPL W P-5'-P-CCNC: 18 U/L (ref 13–39)
BILIRUB SERPL-MCNC: 0.57 MG/DL (ref 0.2–1)
BUN SERPL-MCNC: 14 MG/DL (ref 5–25)
CALCIUM SERPL-MCNC: 9.4 MG/DL (ref 8.4–10.2)
CHLORIDE SERPL-SCNC: 103 MMOL/L (ref 96–108)
CHOLEST SERPL-MCNC: 186 MG/DL (ref ?–200)
CO2 SERPL-SCNC: 29 MMOL/L (ref 21–32)
CREAT SERPL-MCNC: 0.9 MG/DL (ref 0.6–1.3)
GFR SERPL CREATININE-BSD FRML MDRD: 99 ML/MIN/1.73SQ M
GLUCOSE P FAST SERPL-MCNC: 96 MG/DL (ref 65–99)
HDLC SERPL-MCNC: 38 MG/DL
LDLC SERPL CALC-MCNC: 123 MG/DL (ref 0–100)
NONHDLC SERPL-MCNC: 148 MG/DL
POTASSIUM SERPL-SCNC: 4.1 MMOL/L (ref 3.5–5.3)
PROT SERPL-MCNC: 7.1 G/DL (ref 6.4–8.4)
SODIUM SERPL-SCNC: 141 MMOL/L (ref 135–147)
TRIGL SERPL-MCNC: 125 MG/DL (ref ?–150)

## 2025-07-29 PROCEDURE — 80053 COMPREHEN METABOLIC PANEL: CPT

## 2025-07-29 PROCEDURE — 80061 LIPID PANEL: CPT

## 2025-07-29 PROCEDURE — 36415 COLL VENOUS BLD VENIPUNCTURE: CPT

## 2025-08-01 ENCOUNTER — OFFICE VISIT (OUTPATIENT)
Dept: FAMILY MEDICINE CLINIC | Facility: CLINIC | Age: 50
End: 2025-08-01
Payer: COMMERCIAL

## 2025-08-01 VITALS
DIASTOLIC BLOOD PRESSURE: 78 MMHG | HEART RATE: 73 BPM | RESPIRATION RATE: 18 BRPM | BODY MASS INDEX: 40.6 KG/M2 | SYSTOLIC BLOOD PRESSURE: 136 MMHG | HEIGHT: 71 IN | WEIGHT: 290 LBS | TEMPERATURE: 97.8 F | OXYGEN SATURATION: 98 %

## 2025-08-01 DIAGNOSIS — E66.01 MORBID OBESITY WITH BMI OF 40.0-44.9, ADULT (HCC): ICD-10-CM

## 2025-08-01 DIAGNOSIS — Z12.11 SCREENING FOR COLON CANCER: ICD-10-CM

## 2025-08-01 DIAGNOSIS — J45.20 MILD INTERMITTENT ASTHMA WITHOUT COMPLICATION: ICD-10-CM

## 2025-08-01 DIAGNOSIS — Z12.5 SCREENING FOR MALIGNANT NEOPLASM OF PROSTATE: ICD-10-CM

## 2025-08-01 DIAGNOSIS — Z00.00 ANNUAL PHYSICAL EXAM: Primary | ICD-10-CM

## 2025-08-01 DIAGNOSIS — E78.5 HYPERLIPIDEMIA, UNSPECIFIED HYPERLIPIDEMIA TYPE: ICD-10-CM

## 2025-08-01 PROCEDURE — 99396 PREV VISIT EST AGE 40-64: CPT | Performed by: INTERNAL MEDICINE

## 2025-08-01 RX ORDER — SEMAGLUTIDE 0.25 MG/.5ML
INJECTION, SOLUTION SUBCUTANEOUS
Qty: 2 ML | Refills: 0 | Status: SHIPPED | OUTPATIENT
Start: 2025-08-01